# Patient Record
Sex: MALE | Race: WHITE | Employment: FULL TIME | URBAN - NONMETROPOLITAN AREA
[De-identification: names, ages, dates, MRNs, and addresses within clinical notes are randomized per-mention and may not be internally consistent; named-entity substitution may affect disease eponyms.]

---

## 2020-06-17 ENCOUNTER — HOSPITAL ENCOUNTER (EMERGENCY)
Age: 35
Discharge: HOME | End: 2020-06-17
Attending: EMERGENCY MEDICINE
Payer: COMMERCIAL

## 2020-06-17 ENCOUNTER — APPOINTMENT (OUTPATIENT)
Dept: CT IMAGING | Age: 35
End: 2020-06-17
Payer: COMMERCIAL

## 2020-06-17 VITALS
DIASTOLIC BLOOD PRESSURE: 90 MMHG | RESPIRATION RATE: 20 BRPM | SYSTOLIC BLOOD PRESSURE: 139 MMHG | TEMPERATURE: 98.7 F | HEART RATE: 84 BPM | OXYGEN SATURATION: 99 %

## 2020-06-17 PROCEDURE — 99284 EMERGENCY DEPT VISIT MOD MDM: CPT

## 2020-06-17 PROCEDURE — 70450 CT HEAD/BRAIN W/O DYE: CPT

## 2020-06-17 RX ORDER — HYDROXYZINE PAMOATE 25 MG/1
25 CAPSULE ORAL 3 TIMES DAILY PRN
Qty: 20 CAPSULE | Refills: 0 | Status: SHIPPED | OUTPATIENT
Start: 2020-06-17 | End: 2020-07-01

## 2020-06-17 NOTE — ED PROVIDER NOTES
Speech: Speech normal.         Behavior: Behavior normal.         Thought Content: Thought content normal.         Judgment: Judgment normal.         DIAGNOSTIC RESULTS     RADIOLOGY:   Non-plain film images such as CT, Ultrasound and MRI are read by the radiologist. Darlene Priest images are visualized and preliminarily interpreted by the emergency physician with the below findings:    Interpretation per the Radiologist below, if available at the time of this note:    CT Head WO Contrast   Final Result   No acute intracranial abnormality. No skull fracture. Above findings are recorded on a digital voice clip in PACS. Signed by Dr Shahbaz Bellamy on 6/17/2020 10:58 AM            EMERGENCY DEPARTMENT COURSE and DIFFERENTIALDIAGNOSIS/MDM:   Vitals:    Vitals:    06/17/20 0908 06/17/20 1000 06/17/20 1114   BP: (!) 145/91 (!) 144/90 (!) 139/90   Pulse: 82 80 84   Resp: 18 18 20   Temp: 98.7 °F (37.1 °C)     SpO2: 96% 98% 99%       MDM  Patient symptoms sound consistent with PTSD and possibly postconcussive syndrome. Currently, he is totally asymptomatic. No HI. No SI. Offered to have him speak with psychiatry intake which he declined. Told patient to follow-up with [place] [id number] and primary care. .  Told him to return to the ER for change worsening symptoms or new concerns. Patient agreeable plan. CONSULTS:  None    PROCEDURES:  Unless otherwise notedbelow, none     Procedures    FINAL IMPRESSION     1. Injury of head, initial encounter    2. Postconcussive syndrome    3. PTSD (post-traumatic stress disorder)    4.  Elevated blood pressure reading          DISPOSITION/PLAN   DISPOSITION Decision To Discharge 06/17/2020 11:07:12 AM      PATIENT REFERRED TO:  @FUP@    DISCHARGE MEDICATIONS:  Discharge Medication List as of 6/17/2020 11:09 AM      START taking these medications    Details   hydrOXYzine (VISTARIL) 25 MG capsule Take 1 capsule by mouth 3 times daily as needed for Anxiety, Disp-20 capsule, R-0Print                (Please note that portions of this note were completed with a voice recognition program.  Efforts were made to edit the dictations butoccasionally words are mis-transcribed.)    Phill Ernst MD (electronically signed)  AttendingEmergency Physician        Phill Ernst MD  06/18/20 8686

## 2020-06-18 ASSESSMENT — ENCOUNTER SYMPTOMS
ABDOMINAL PAIN: 0
BACK PAIN: 0
DIARRHEA: 0
SHORTNESS OF BREATH: 0
EYE PAIN: 0
VOMITING: 0

## 2020-11-17 ENCOUNTER — OFFICE VISIT (OUTPATIENT)
Dept: NEUROSURGERY | Facility: CLINIC | Age: 35
End: 2020-11-17

## 2020-11-17 VITALS
DIASTOLIC BLOOD PRESSURE: 100 MMHG | BODY MASS INDEX: 26.78 KG/M2 | SYSTOLIC BLOOD PRESSURE: 152 MMHG | WEIGHT: 180.8 LBS | HEIGHT: 69 IN

## 2020-11-17 DIAGNOSIS — S09.90XA HEAD TRAUMA, INITIAL ENCOUNTER: ICD-10-CM

## 2020-11-17 DIAGNOSIS — S06.0X1A CONCUSSION WITH LOSS OF CONSCIOUSNESS OF 30 MINUTES OR LESS, INITIAL ENCOUNTER: Primary | ICD-10-CM

## 2020-11-17 DIAGNOSIS — F43.10 PTSD (POST-TRAUMATIC STRESS DISORDER): ICD-10-CM

## 2020-11-17 DIAGNOSIS — F17.210 CIGARETTE SMOKER: ICD-10-CM

## 2020-11-17 DIAGNOSIS — G44.319 ACUTE POST-TRAUMATIC HEADACHE, NOT INTRACTABLE: ICD-10-CM

## 2020-11-17 DIAGNOSIS — M54.2 CERVICALGIA: ICD-10-CM

## 2020-11-17 PROBLEM — G44.309 POST-TRAUMATIC HEADACHE: Status: ACTIVE | Noted: 2020-11-17

## 2020-11-17 PROCEDURE — 99204 OFFICE O/P NEW MOD 45 MIN: CPT | Performed by: NEUROLOGICAL SURGERY

## 2020-11-17 RX ORDER — DICLOFENAC SODIUM 75 MG/1
75 TABLET, DELAYED RELEASE ORAL 2 TIMES DAILY
Qty: 60 TABLET | Refills: 0 | OUTPATIENT
Start: 2020-11-17 | End: 2022-07-29

## 2020-11-17 RX ORDER — BUTALBITAL, ACETAMINOPHEN AND CAFFEINE 50; 325; 40 MG/1; MG/1; MG/1
1 TABLET ORAL EVERY 8 HOURS PRN
Qty: 84 TABLET | Refills: 0 | Status: SHIPPED | OUTPATIENT
Start: 2020-11-17 | End: 2021-03-30

## 2020-11-17 RX ORDER — CYCLOBENZAPRINE HCL 10 MG
10 TABLET ORAL 3 TIMES DAILY PRN
Qty: 90 TABLET | Refills: 0 | Status: SHIPPED | OUTPATIENT
Start: 2020-11-17 | End: 2021-03-30

## 2020-11-17 NOTE — PROGRESS NOTES
Patient: London Calabrese  : 1985    Primary Care Provider: Provider, No Known    Requesting Provider: Augustina Partida APRN        History    Chief Complaint: Closed head injury  Chief Complaint   Patient presents with   • Neck pain & headaches     patient had a work injury in 2020 where he was hit in the face with a metal pipe       History of Present Illness: 35-year-old gentleman with a complicated history.  He was hit in the head with a casting fixture in 2020 while at work.  He had a positive loss of consciousness for a few minutes and then also broke several teeth.  He was seen by a dentist and then an oral surgeon the following day.  He has had 2 oral surgery procedures for the broken teeth and is waiting his permanent implants.  He was started on some pain medication which he took for a couple months and then stopped his pain medicine in  or July.  He went back to work in July as a .  He is only been able to work about 20 or 30 hours a week because of what are essentially postconcussive symptoms.  He complains of neck pain and headache daily.  This can last several minutes to hours.  It is associated with photophobia and nausea.  He complains of neck pain which is intermittent but no upper extremity pain numbness or tingling.  He describes dizziness with some position changes.  He also describes worsening generalized anxiety, fear of being injured at work, fear of heights.    Review of Systems   Musculoskeletal: Positive for neck pain and neck stiffness.   Neurological: Positive for dizziness and headaches.   All other systems reviewed and are negative.      Past Medical History: No past medical history on file.    Past Surgical History: No past surgical history on file.    Family History: family history is not on file.    Social History:      Medications:  (Not in a hospital admission)      Allergies:  Patient has no allergy information on record.    Physical Exam:      Physical Exam  Eyes:      Extraocular Movements: EOM normal.      Pupils: Pupils are equal, round, and reactive to light.   Cardiovascular:      Rate and Rhythm: Normal rate and regular rhythm.   Pulmonary:      Effort: Pulmonary effort is normal. No respiratory distress.   Abdominal:      General: There is no distension.      Palpations: Abdomen is soft.      Tenderness: There is no abdominal tenderness.   Neurological:      Mental Status: He is oriented to person, place, and time.      Coordination: Finger-Nose-Finger Test normal.      Gait: Gait is intact.      Deep Tendon Reflexes: Strength normal.   Psychiatric:         Speech: Speech normal.         Neurologic Exam     Mental Status   Oriented to person, place, and time.   Attention: normal.   Speech: speech is normal   Level of consciousness: alert  Knowledge: good.     Cranial Nerves     CN II   Visual fields full to confrontation.     CN III, IV, VI   Pupils are equal, round, and reactive to light.  Extraocular motions are normal.     CN V   Facial sensation intact.     CN VII   Facial expression full, symmetric.     CN VIII   CN VIII normal.     CN IX, X   CN IX normal.   CN X normal.     CN XI   CN XI normal.     CN XII   CN XII normal.     Motor Exam   Muscle bulk: normal  Overall muscle tone: normal  Right arm pronator drift: absent  Left arm pronator drift: absent    Strength   Strength 5/5 throughout.     Sensory Exam   Light touch normal.   Pinprick normal.     Gait, Coordination, and Reflexes     Gait  Gait: normal    Coordination   Finger to nose coordination: normal    Tremor   Resting tremor: absent  Intention tremor: absent  Action tremor: absent    Reflexes   Reflexes 2+ except as noted.         Independent Review of Radiographic Studies:   Report of CT scan of the head unremarkable    ASSESSMENT/PLAN:1.  Postconcussion syndrome.  The patient presents with a classic postconcussion syndrome.  His symptoms are aggravated by activities.  We  discussed this at length.  I would recommend taking him out of work to engage in no strenuous activities in order to begin the process of recovering from his concussion.  In addition we are going to make a referral for vestibular ocular rehab postconcussion.  We will prescribe Fioricet for his headaches.  2.  Cervicalgia.  The patient is complaining of neck pain and associated with headaches.  Again this is worse with activities.  We are going to image his neck as this is never been done since the accident.  We will get a CT scan and plain x-rays of the cervical spine.  We will also likely refer him for physical therapy and rehab for his neck injury.  We will also prescribe Voltaren and Flexeril for his neck pain.  3.  Facial injury.  Given the severity of his facial injury I would recommend a CT scan of the facial bones to ensure that there was no additional damage done beyond his teeth.  4.  Anxiety.  The patient has a component of posttraumatic stress disorder since the accident.  We explained this to him we will continue to monitor it and treat his other symptoms to see if this improves.  No diagnosis found.      No follow-ups on file.      Anthony Orona MD

## 2020-11-17 NOTE — PATIENT INSTRUCTIONS
"PATIENT TO CONTINUE TO FOLLOW UP WITH HIS PRIMARY CARE PROVIDER FOR YEARLY PHYSICAL EXAMS TO ENSURE COMPLETE HEALTH MAINTENANCE        BMI for Adults  What is BMI?  Body mass index (BMI) is a number that is calculated from a person's weight and height. BMI can help estimate how much of a person's weight is composed of fat. BMI does not measure body fat directly. Rather, it is an alternative to procedures that directly measure body fat, which can be difficult and expensive.  BMI can help identify people who may be at higher risk for certain medical problems.  What are BMI measurements used for?  BMI is used as a screening tool to identify possible weight problems. It helps determine whether a person is obese, overweight, a healthy weight, or underweight.  BMI is useful for:  · Identifying a weight problem that may be related to a medical condition or may increase the risk for medical problems.  · Promoting changes, such as changes in diet and exercise, to help reach a healthy weight. BMI screening can be repeated to see if these changes are working.  How is BMI calculated?  BMI involves measuring your weight in relation to your height. Both height and weight are measured, and the BMI is calculated from those numbers. This can be done either in English ([place] or metric measurements. Note that charts and online BMI calculators are available to help you find your BMI quickly and easily without having to do these calculations yourself.  To calculate your BMI in English ([place] measurements:    1. Measure your weight in pounds (lb).  2. Multiply the number of pounds by 703.  ? For example, for a person who weighs 180 lb, multiply that number by 703, which equals 126,540.  3. Measure your height in inches. Then multiply that number by itself to get a measurement called \"inches squared.\"  ? For example, for a person who is 70 inches tall, the \"inches squared\" measurement is 70 inches x 70 inches, which equals 4,900 inches " "squared.  4. Divide the total from step 2 (number of lb x 703) by the total from step 3 (inches squared): 126,540 ÷ 4,900 = 25.8. This is your BMI.  To calculate your BMI in metric measurements:  1. Measure your weight in kilograms (kg).  2. Measure your height in meters (m). Then multiply that number by itself to get a measurement called \"meters squared.\"  ? For example, for a person who is 1.75 m tall, the \"meters squared\" measurement is 1.75 m x 1.75 m, which is equal to 3.1 meters squared.  3. Divide the number of kilograms (your weight) by the meters squared number. In this example: 70 ÷ 3.1 = 22.6. This is your BMI.  What do the results mean?  BMI charts are used to identify whether you are underweight, normal weight, overweight, or obese. The following guidelines will be used:  · Underweight: BMI less than 18.5.  · Normal weight: BMI between 18.5 and 24.9.  · Overweight: BMI between 25 and 29.9.  · Obese: BMI of 30 or above.  Keep these notes in mind:  · Weight includes both fat and muscle, so someone with a muscular build, such as an athlete, may have a BMI that is higher than 24.9. In cases like these, BMI is not an accurate measure of body fat.  · To determine if excess body fat is the cause of a BMI of 25 or higher, further assessments may need to be done by a health care provider.  · BMI is usually interpreted in the same way for men and women.  Where to find more information  For more information about BMI, including tools to quickly calculate your BMI, go to these websites:  · Centers for Disease Control and Prevention: www.cdc.gov  · American Heart Association: www.heart.org  · National Heart, Lung, and Blood [place]: www.nhlbi.nih.gov  Summary  · Body mass index (BMI) is a number that is calculated from a person's weight and height.  · BMI may help estimate how much of a person's weight is composed of fat. BMI can help identify those who may be at higher risk for certain medical problems.  · BMI " "can be measured using English measurements or metric measurements.  · BMI charts are used to identify whether you are underweight, normal weight, overweight, or obese.  This information is not intended to replace advice given to you by your health care provider. Make sure you discuss any questions you have with your health care provider.  Document Released: 08/29/2005 Document Revised: 09/09/2020 Document Reviewed: 07/17/2020  Elsevier Patient Education © 2020 [employer]      DASH Eating Plan  DASH stands for \"Dietary Approaches to Stop Hypertension.\" The DASH eating plan is a healthy eating plan that has been shown to reduce high blood pressure (hypertension). It may also reduce your risk for type 2 diabetes, heart disease, and stroke. The DASH eating plan may also help with weight loss.  What are tips for following this plan?    General guidelines  · Avoid eating more than 2,300 mg (milligrams) of salt (sodium) a day. If you have hypertension, you may need to reduce your sodium intake to 1,500 mg a day.  · Limit alcohol intake to no more than 1 drink a day for nonpregnant women and 2 drinks a day for men. One drink equals 12 oz of beer, 5 oz of wine, or 1½ oz of hard liquor.  · Work with your health care provider to maintain a healthy body weight or to lose weight. Ask what an ideal weight is for you.  · Get at least 30 minutes of exercise that causes your heart to beat faster (aerobic exercise) most days of the week. Activities may include walking, swimming, or biking.  · Work with your health care provider or diet and nutrition specialist (dietitian) to adjust your eating plan to your individual calorie needs.  Reading food labels    · Check food labels for the amount of sodium per serving. Choose foods with less than 5 percent of the Daily Value of sodium. Generally, foods with less than 300 mg of sodium per serving fit into this eating plan.  · To find whole grains, look for the word \"whole\" as the first " "word in the ingredient list.  Shopping  · Buy products labeled as \"low-sodium\" or \"no salt added.\"  · Buy fresh foods. Avoid canned foods and premade or frozen meals.  Cooking  · Avoid adding salt when cooking. Use salt-free seasonings or herbs instead of table salt or sea salt. Check with your health care provider or pharmacist before using salt substitutes.  · Do not snider foods. Cook foods using healthy methods such as baking, boiling, grilling, and broiling instead.  · Cook with heart-healthy oils, such as olive, canola, soybean, or sunflower oil.  Meal planning  · Eat a balanced diet that includes:  ? 5 or more servings of fruits and vegetables each day. At each meal, try to fill half of your plate with fruits and vegetables.  ? Up to 6-8 servings of whole grains each day.  ? Less than 6 oz of lean meat, poultry, or fish each day. A 3-oz serving of meat is about the same size as a deck of cards. One egg equals 1 oz.  ? 2 servings of low-fat dairy each day.  ? A serving of nuts, seeds, or beans 5 times each week.  ? Heart-healthy fats. Healthy fats called Omega-3 fatty acids are found in foods such as flaxseeds and coldwater fish, like sardines, salmon, and mackerel.  · Limit how much you eat of the following:  ? Canned or prepackaged foods.  ? Food that is high in trans fat, such as fried foods.  ? Food that is high in saturated fat, such as fatty meat.  ? Sweets, desserts, sugary drinks, and other foods with added sugar.  ? Full-fat dairy products.  · Do not salt foods before eating.  · Try to eat at least 2 vegetarian meals each week.  · Eat more home-cooked food and less restaurant, buffet, and fast food.  · When eating at a restaurant, ask that your food be prepared with less salt or no salt, if possible.  What foods are recommended?  The items listed may not be a complete list. Talk with your dietitian about what dietary choices are best for you.  Grains  Whole-grain or whole-wheat bread. Whole-grain or " whole-wheat pasta. Brown rice. Oatmeal. Quinoa. Bulgur. Whole-grain and low-sodium cereals. Sandra bread. Low-fat, low-sodium crackers. Whole-wheat flour tortillas.  Vegetables  Fresh or frozen vegetables (raw, steamed, roasted, or grilled). Low-sodium or reduced-sodium tomato and vegetable juice. Low-sodium or reduced-sodium tomato sauce and tomato paste. Low-sodium or reduced-sodium canned vegetables.  Fruits  All fresh, dried, or frozen fruit. Canned fruit in natural juice (without added sugar).  Meat and other protein foods  Skinless chicken or turkey. Ground chicken or turkey. Pork with fat trimmed off. Fish and seafood. Egg whites. Dried beans, peas, or lentils. Unsalted nuts, nut butters, and seeds. Unsalted canned beans. Lean cuts of beef with fat trimmed off. Low-sodium, lean deli meat.  Dairy  Low-fat (1%) or fat-free (skim) milk. Fat-free, low-fat, or reduced-fat cheeses. Nonfat, low-sodium ricotta or cottage cheese. Low-fat or nonfat yogurt. Low-fat, low-sodium cheese.  Fats and oils  Soft margarine without trans fats. Vegetable oil. Low-fat, reduced-fat, or light mayonnaise and salad dressings (reduced-sodium). Canola, safflower, olive, soybean, and sunflower oils. Avocado.  Seasoning and other foods  Herbs. Spices. Seasoning mixes without salt. Unsalted popcorn and pretzels. Fat-free sweets.  What foods are not recommended?  The items listed may not be a complete list. Talk with your dietitian about what dietary choices are best for you.  Grains  Baked goods made with fat, such as croissants, muffins, or some breads. Dry pasta or rice meal packs.  Vegetables  Creamed or fried vegetables. Vegetables in a cheese sauce. Regular canned vegetables (not low-sodium or reduced-sodium). Regular canned tomato sauce and paste (not low-sodium or reduced-sodium). Regular tomato and vegetable juice (not low-sodium or reduced-sodium). Pickles. Olives.  Fruits  Canned fruit in a light or heavy syrup. Fried fruit. Fruit  in cream or butter sauce.  Meat and other protein foods  Fatty cuts of meat. Ribs. Fried meat. Shannon. Sausage. Bologna and other processed lunch meats. Salami. Fatback. Hotdogs. Bratwurst. Salted nuts and seeds. Canned beans with added salt. Canned or smoked fish. Whole eggs or egg yolks. Chicken or turkey with skin.  Dairy  Whole or 2% milk, cream, and half-and-half. Whole or full-fat cream cheese. Whole-fat or sweetened yogurt. Full-fat cheese. Nondairy creamers. Whipped toppings. Processed cheese and cheese spreads.  Fats and oils  Butter. Stick margarine. Lard. Shortening. Ghee. Shannon fat. Tropical oils, such as coconut, palm kernel, or palm oil.  Seasoning and other foods  Salted popcorn and pretzels. Onion salt, garlic salt, seasoned salt, table salt, and sea salt. Worcestershire sauce. Tartar sauce. Barbecue sauce. Teriyaki sauce. Soy sauce, including reduced-sodium. Steak sauce. Canned and packaged gravies. Fish sauce. Oyster sauce. Cocktail sauce. Horseradish that you find on the shelf. Ketchup. Mustard. Meat flavorings and tenderizers. Bouillon cubes. Hot sauce and Tabasco sauce. Premade or packaged marinades. Premade or packaged taco seasonings. Relishes. Regular salad dressings.  Where to find more information:  · National Heart, Lung, and Blood [place]: www.nhlbi.nih.gov  · American Heart Association: www.heart.org  Summary  · The DASH eating plan is a healthy eating plan that has been shown to reduce high blood pressure (hypertension). It may also reduce your risk for type 2 diabetes, heart disease, and stroke.  · With the DASH eating plan, you should limit salt (sodium) intake to 2,300 mg a day. If you have hypertension, you may need to reduce your sodium intake to 1,500 mg a day.  · When on the DASH eating plan, aim to eat more fresh fruits and vegetables, whole grains, lean proteins, low-fat dairy, and heart-healthy fats.  · Work with your health care provider or diet and nutrition specialist  (dietitian) to adjust your eating plan to your individual calorie needs.  This information is not intended to replace advice given to you by your health care provider. Make sure you discuss any questions you have with your health care provider.  Document Released: 12/06/2012 Document Revised: 11/30/2018 Document Reviewed: 12/11/2017  [employer] Patient Education © 2020 [employer]      Steps to Quit Smoking  Smoking tobacco is the leading cause of preventable death. It can affect almost every organ in the body. Smoking puts you and those around you at risk for developing many serious chronic diseases. Quitting smoking can be difficult, but it is one of the best things that you can do for your health. It is never too late to quit.  How do I get ready to quit?  When you decide to quit smoking, create a plan to help you succeed. Before you quit:  · Pick a date to quit. Set a date within the next 2 weeks to give you time to prepare.  · Write down the reasons why you are quitting. Keep this list in places where you will see it often.  · Tell your family, friends, and co-workers that you are quitting. Support from your loved ones can make quitting easier.  · Talk with your health care provider about your options for quitting smoking.  · Find out what treatment options are covered by your health insurance.  · Identify people, places, things, and activities that make you want to smoke (triggers). Avoid them.  What first steps can I take to quit smoking?  · Throw away all cigarettes at home, at work, and in your car.  · Throw away smoking accessories, such as ashtrays and lighters.  · Clean your car. Make sure to empty the ashtray.  · Clean your home, including curtains and carpets.  What strategies can I use to quit smoking?  Talk with your health care provider about combining strategies, such as taking medicines while you are also receiving in-person counseling. Using these two strategies together makes you more likely to  succeed in quitting than if you used either strategy on its own.  · If you are pregnant or breastfeeding, talk with your health care provider about finding counseling or other support strategies to quit smoking. Do not take medicine to help you quit smoking unless your health care provider tells you to do so.  To quit smoking:  Quit right away  · Quit smoking completely, instead of gradually reducing how much you smoke over a period of time. Research shows that stopping smoking right away is more successful than gradually quitting.  · Attend in-person counseling to help you build problem-solving skills. You are more likely to succeed in quitting if you attend counseling sessions regularly. Even short sessions of 10 minutes can be effective.  Take medicine  You may take medicines to help you quit smoking. Some medicines require a prescription and some you can purchase over-the-counter. Medicines may have nicotine in them to replace the nicotine in cigarettes. Medicines may:  · Help to stop cravings.  · Help to relieve withdrawal symptoms.  Your health care provider may recommend:  · Nicotine patches, gum, or lozenges.  · Nicotine inhalers or sprays.  · Non-nicotine medicine that is taken by mouth.  Find resources  Find resources and support systems that can help you to quit smoking and remain smoke-free after you quit. These resources are most helpful when you use them often. They include:  · Online chats with a counselor.  · Telephone quitlines.  · Printed self-help materials.  · Support groups or group counseling.  · Text messaging programs.  · Mobile phone apps or applications. Use apps that can help you stick to your quit plan by providing reminders, tips, and encouragement. There are many free apps for mobile devices as well as websites. Examples include Quit Guide from the CDC and smokefree.gov  What things can I do to make it easier to quit?    · Reach out to your family and friends for support and  encouragement. Call telephone quitlines (0-928-QUITNOW), reach out to support groups, or work with a counselor for support.  · Ask people who smoke to avoid smoking around you.  · Avoid places that trigger you to smoke, such as bars, parties, or smoke-break areas at work.  · Spend time with people who do not smoke.  · Lessen the stress in your life. Stress can be a smoking trigger for some people. To lessen stress, try:  ? Exercising regularly.  ? Doing deep-breathing exercises.  ? Doing yoga.  ? Meditating.  ? Performing a body scan. This involves closing your eyes, scanning your body from head to toe, and noticing which parts of your body are particularly tense. Try to relax the muscles in those areas.  How will I feel when I quit smoking?  Day 1 to 3 weeks  Within the first 24 hours of quitting smoking, you may start to feel withdrawal symptoms. These symptoms are usually most noticeable 2-3 days after quitting, but they usually do not last for more than 2-3 weeks. You may experience these symptoms:  · Mood swings.  · Restlessness, anxiety, or irritability.  · Trouble concentrating.  · Dizziness.  · Strong cravings for sugary foods and nicotine.  · Mild weight gain.  · Constipation.  · Nausea.  · Coughing or a sore throat.  · Changes in how the medicines that you take for unrelated issues work in your body.  · Depression.  · Trouble sleeping (insomnia).  Week 3 and afterward  After the first 2-3 weeks of quitting, you may start to notice more positive results, such as:  · Improved sense of smell and taste.  · Decreased coughing and sore throat.  · Slower heart rate.  · Lower blood pressure.  · Clearer skin.  · The ability to breathe more easily.  · Fewer sick days.  Quitting smoking can be very challenging. Do not get discouraged if you are not successful the first time. Some people need to make many attempts to quit before they achieve long-term success. Do your best to stick to your quit plan, and talk with  your health care provider if you have any questions or concerns.  Summary  · Smoking tobacco is the leading cause of preventable death. Quitting smoking is one of the best things that you can do for your health.  · When you decide to quit smoking, create a plan to help you succeed.  · Quit smoking right away, not slowly over a period of time.  · When you start quitting, seek help from your health care provider, family, or friends.  This information is not intended to replace advice given to you by your health care provider. Make sure you discuss any questions you have with your health care provider.  Document Released: 12/12/2002 Document Revised: 09/11/2020 Document Reviewed: 03/07/2020  Elsevier Patient Education © 2020 [employer]

## 2020-11-19 ENCOUNTER — TELEPHONE (OUTPATIENT)
Dept: NEUROSURGERY | Facility: CLINIC | Age: 35
End: 2020-11-19

## 2020-11-20 ENCOUNTER — TREATMENT (OUTPATIENT)
Dept: PHYSICAL THERAPY | Facility: CLINIC | Age: 35
End: 2020-11-20

## 2020-11-20 DIAGNOSIS — S09.90XA HEAD TRAUMA, INITIAL ENCOUNTER: ICD-10-CM

## 2020-11-20 DIAGNOSIS — M54.2 CERVICALGIA: ICD-10-CM

## 2020-11-20 DIAGNOSIS — S06.0X1A CONCUSSION WITH LOSS OF CONSCIOUSNESS OF 30 MINUTES OR LESS, INITIAL ENCOUNTER: Primary | ICD-10-CM

## 2020-11-20 PROCEDURE — 97162 PT EVAL MOD COMPLEX 30 MIN: CPT | Performed by: PHYSICAL THERAPIST

## 2020-12-01 ENCOUNTER — TREATMENT (OUTPATIENT)
Dept: PHYSICAL THERAPY | Facility: CLINIC | Age: 35
End: 2020-12-01

## 2020-12-01 DIAGNOSIS — S06.0X1A CONCUSSION WITH LOSS OF CONSCIOUSNESS OF 30 MINUTES OR LESS, INITIAL ENCOUNTER: Primary | ICD-10-CM

## 2020-12-01 DIAGNOSIS — M54.2 CERVICALGIA: ICD-10-CM

## 2020-12-01 DIAGNOSIS — S09.90XA HEAD TRAUMA, INITIAL ENCOUNTER: ICD-10-CM

## 2020-12-01 PROCEDURE — 97140 MANUAL THERAPY 1/> REGIONS: CPT | Performed by: PHYSICAL THERAPIST

## 2020-12-01 NOTE — PROGRESS NOTES
Outpatient Physical Therapy Ortho Treatment Note       Patient Name: London Calabrese  : 1985  MRN: [id number]  Today's Date: 2020      Visit Date: 2020    Visit Dx:    ICD-10-CM ICD-9-CM   1. Concussion with loss of consciousness of 30 minutes or less, initial encounter  S06.0X1A 850.11   2. Cervicalgia  M54.2 723.1   3. Head trauma, initial encounter  S09.90XA 959.01       Patient Active Problem List   Diagnosis   • Cigarette smoker   • BMI 26.0-26.9,adult   • Head trauma, initial encounter   • Concussion with loss of consciousness of 30 minutes or less   • Post-traumatic headache   • Cervicalgia   • PTSD (post-traumatic stress disorder)        Past Medical History:   Diagnosis Date   • Brain concussion     work injury in 2020   • Headache    • Neck pain     from the work injury in 2020        Past Surgical History:   Procedure Laterality Date   • NO PAST SURGERIES                         PT Assessment/Plan     Row Name 20          PT Assessment    Assessment Comments  Today is his first visit after his eval. His HEP works very well for him. He understands better the cognitive parts of his concussion. I did a trial of dry needling today to try to get his mms to relax a bit. He was extremely guarded and tight and it was painful for him but after, he could turn his head easier without feeling the grinding in his neck he normally does.   -TB        PT Plan    PT Plan Comments  Cont to work on muscle relaxation and slowly progress POC with balance and vestibular rehab and neck pain diminishes.   -TB       User Key  (r) = Recorded By, (t) = Taken By, (c) = Cosigned By    Initials Name Provider Type    TB Pete York, PT Physical Therapist            OP Exercises     Row Name 20 0945 20 0940          Subjective Comments    Subjective Comments  He is still struggling with neck pain and headaches. He is also having headaches and struggling with cognition and procession.  "He gets overwhelmed easily and struggles to stay on topic. He's been doing his HEP of unloading the weight off his neck with pillows under his arms.   -TB  --        Subjective Pain    Pre-Treatment Pain Level  7  -TB  --        Total Minutes    33204 - PT Manual Therapy Minutes  --  38  -TB       User Key  (r) = Recorded By, (t) = Taken By, (c) = Cosigned By    Initials Name Provider Type    TB Pete York, PT Physical Therapist                      Manual Rx (last 36 hours)      Manual Treatments     Row Name 12/01/20 0940             Total Minutes    22202 - PT Manual Therapy Minutes  38  -TB         Manual Rx 1    Manual Rx 1 Location  Trial of dry needling, prone  -TB      Manual Rx 1 Type  marcus accessory nn (2\", one LTR right, very guarded and painful), marcus C5 post cut (2\", very guarded and painful); marcus inf cerv oblique (1\"), marcus subocc, UT/SC (0.5\")  -TB         Manual Rx 2    Manual Rx 2 Location  prone marcus UT/LS and post cervical mms  -TB      Manual Rx 2 Type  STM  -TB         Manual Rx 3    Manual Rx 3 Location  prone gentle CT junction  -TB      Manual Rx 3 Type  ext mobs sustained  -TB         Manual Rx 4    Manual Rx 4 Location  marcus subocc STM and gentle PA's  -TB         Manual Rx 5    Manual Rx 5 Location  supine subocc release  -TB      Manual Rx 5 Type  gentle sustained OP  -TB        User Key  (r) = Recorded By, (t) = Taken By, (c) = Cosigned By    Initials Name Provider Type    TB Pete York, PT Physical Therapist          PT OP Goals     Row Name 12/01/20 0940          PT Short Term Goals    STG Date to Achieve  12/20/20  -TB     STG 1  Pt will demonstrate 50 degrees or greater (B) cervical rotaiton.   -TB     STG 1 Progress  Ongoing  -TB     STG 1 Progress Comments  painful with rotation  -TB        Long Term Goals    LTG Date to Achieve  01/19/21  -TB     LTG 1  Pt will demonstrate 65 degrees or greater (B) cervical rotaiton.   -TB     LTG 1 Progress  Ongoing  -TB     LTG 2  Pt will " score 10 or less on the NDI.   -TB     LTG 2 Progress  Ongoing  -TB     LTG 3  Pt will be indpendent with HEP to include postural strengthening and vestibular exercises.   -TB     LTG 3 Progress  Ongoing  -TB     LTG 4  Pt will report no dizziness with bending over for one week.   -TB     LTG 4 Progress  Ongoing  -TB     LTG 5  Pt will demonstrate SLS for 30 seconds or greater (B).   -TB     LTG 5 Progress  Ongoing  -TB        Time Calculation    PT Goal Re-Cert Due Date  12/20/20  -TB       User Key  (r) = Recorded By, (t) = Taken By, (c) = Cosigned By    Initials Name Provider Type    TB Pete York, PT Physical Therapist          Therapy Education  Education Details: discussed post concussion symptoms (anxiety, cognition, vertigo, pain, etc and path to improving these)  Given: HEP, Symptoms/condition management, Posture/body mechanics  Program: Reinforced  How Provided: Verbal, Demonstration, Written  Provided to: Patient  Level of Understanding: Verbalized, Demonstrated              Time Calculation:                    Pete York, PT  12/1/2020

## 2020-12-03 ENCOUNTER — TREATMENT (OUTPATIENT)
Dept: PHYSICAL THERAPY | Facility: CLINIC | Age: 35
End: 2020-12-03

## 2020-12-03 DIAGNOSIS — S09.90XA HEAD TRAUMA, INITIAL ENCOUNTER: ICD-10-CM

## 2020-12-03 DIAGNOSIS — S06.0X1A CONCUSSION WITH LOSS OF CONSCIOUSNESS OF 30 MINUTES OR LESS, INITIAL ENCOUNTER: Primary | ICD-10-CM

## 2020-12-03 DIAGNOSIS — M54.2 CERVICALGIA: ICD-10-CM

## 2020-12-03 PROCEDURE — 97140 MANUAL THERAPY 1/> REGIONS: CPT | Performed by: PHYSICAL THERAPIST

## 2020-12-03 PROCEDURE — 97112 NEUROMUSCULAR REEDUCATION: CPT | Performed by: PHYSICAL THERAPIST

## 2020-12-03 NOTE — PROGRESS NOTES
Outpatient Physical Therapy Neuro Treatment Note       Patient Name: London Calabrese  : 1985  MRN: [id number]  Today's Date: 12/3/2020      Visit Date: 2020    Visit Dx:    ICD-10-CM ICD-9-CM   1. Concussion with loss of consciousness of 30 minutes or less, initial encounter  S06.0X1A 850.11   2. Cervicalgia  M54.2 723.1   3. Head trauma, initial encounter  S09.90XA 959.01       Patient Active Problem List   Diagnosis   • Cigarette smoker   • BMI 26.0-26.9,adult   • Head trauma, initial encounter   • Concussion with loss of consciousness of 30 minutes or less   • Post-traumatic headache   • Cervicalgia   • PTSD (post-traumatic stress disorder)                       PT Assessment/Plan     Row Name 20 1016          PT Assessment    Assessment Comments  London has increased cervical and thoracic hypomobility and pain which we addressed with STM today. He continues to have difficulty with forward flexion causing dizziness. He has more difficulty with visual tracking to the R side and he struggled with midline orientation, tending to lean to the L side. With balance activities, he demonstrated increased instability with eyes closed as well as tandem stance with RLE posterior. He demonstrated increased unsteadiness with WS to L side and with eyes closed was unable to orient to from R WS to midline. He will benefit from continued skilled therapy to address soft tissue restrictions and decrease vestibular symptoms during movement.   -EC (r) KM (t) EC (c)        PT Plan    PT Plan Comments  Continue with STM to cervical spine and cervical AROM. Progress with balance and visual training as tolerated.   -EC (r) KM (t) EC (c)       User Key  (r) = Recorded By, (t) = Taken By, (c) = Cosigned By    Initials Name Provider Type    Eusebio Marmolejo PTA Physical Therapy Assistant    Holly Moreno PTA Student Physical Therapy Assistant             OP Exercises     Row Name 20 1016             Subjective  Comments    Subjective Comments  He reported that he had severe pain following the DN trial. He continues having issues with popping in neck leading to headaches. He is having difficulty seeing things far away and sees black spots when he bends forward. He feels more tension on L neck and the pain is moving down into his shoulder blades. He is having an MRI tomorrow.   -EC (r) KM (t) EC (c)         Subjective Pain    Able to rate subjective pain?  yes  -EC (r) KM (t) EC (c)      Pre-Treatment Pain Level  7  -EC (r) KM (t) EC (c)      Post-Treatment Pain Level  4 upper cervical   -EC (r) KM (t) EC (c)         Total Minutes    84058 -  PT Neuromuscular Reeducation Minutes  25  -EC (r) KM (t) EC (c)      87672 - PT Manual Therapy Minutes  20  -EC (r) KM (t) EC (c)         Exercise 1    Exercise Name 1  Assessed visual tracking    -EC (r) KM (t) EC (c)      Additional Comments  Increased dizziness, blink rate and eye movement with moving from center to R and from L to R. Nystagmus noted with movement to both sides.   -EC (r) KM (t) EC (c)         Exercise 2    Exercise Name 2  Rhomberg stance EO/EC   -EC (r) KM (t) EC (c)      Cueing 2  Verbal;Tactile  -EC (r) KM (t) EC (c)      Sets 2  2  -EC (r) KM (t) EC (c)      Time 2  30 sec  -EC (r) KM (t) EC (c)      Additional Comments  More difficulty with EC  -EC (r) KM (t) EC (c)         Exercise 3    Exercise Name 3  Tandem stance EO/EC  -EC (r) KM (t) EC (c)      Cueing 3  Verbal;Tactile;Demo  -EC (r) KM (t) EC (c)      Sets 3  2 each  -EC (r) KM (t) EC (c)      Time 3  10 seconds  -EC (r) KM (t) EC (c)      Additional Comments  More difficulty with EC and with RLE posterior   -EC (r) KM (t) EC (c)         Exercise 4    Exercise Name 4  Weight shift on foam EO-EC  -EC (r) KM (t) EC (c)      Cueing 4  Verbal;Tactile;Demo  -EC (r) KM (t) EC (c)      Sets 4  2  -EC (r) KM (t) EC (c)      Additional Comments  Assessed midline orientation. Pt feels weight shift to L side is  centered.   -EC (r) KM (t) EC (c)        User Key  (r) = Recorded By, (t) = Taken By, (c) = Cosigned By    Initials Name Provider Type    Eusebio Marmolejo PTA Physical Therapy Assistant    Hloly Moreno PTA Student Physical Therapy Assistant                     Manual Rx (last 36 hours)      Manual Treatments     Row Name 12/03/20 1016             Total Minutes    02051 - PT Manual Therapy Minutes  20  -EC (r) KM (t) EC (c)         Manual Rx 1    Manual Rx 1 Location  B cervical paraspinals, suboccipitals, UT, rhomboids  -EC (r) KM (t) EC (c)      Manual Rx 1 Type  STM wih massage cream with pt in massage chair  -EC (r) KM (t) EC (c)      Manual Rx 1 Grade  min  -EC (r) KM (t) EC (c)      Manual Rx 1 Duration  20  -EC (r) KM (t) EC (c)        User Key  (r) = Recorded By, (t) = Taken By, (c) = Cosigned By    Initials Name Provider Type    Eusebio Marmolejo PTA Physical Therapy Assistant    Holly Moreno, LINDSEY Student Physical Therapy Assistant          PT OP Goals     Row Name 12/03/20 1016          PT Short Term Goals    STG Date to Achieve  12/20/20  -EC (r) KM (t) EC (c)     STG 1  Pt will demonstrate 50 degrees or greater (B) cervical rotaiton.   -EC (r) KM (t) EC (c)     STG 1 Progress  Ongoing  -EC (r) KM (t) EC (c)     STG 1 Progress Comments  Educated pt to perform cervical AROM at home to avoid stiffness.   -EC (r) KM (t) EC (c)        Long Term Goals    LTG Date to Achieve  01/19/21  -EC (r) KM (t) EC (c)     LTG 1  Pt will demonstrate 65 degrees or greater (B) cervical rotaiton.   -EC (r) KM (t) EC (c)     LTG 1 Progress  Ongoing  -EC (r) KM (t) EC (c)     LTG 2  Pt will score 10 or less on the NDI.   -EC (r) KM (t) EC (c)     LTG 2 Progress  Ongoing  -EC (r) KM (t) EC (c)     LTG 3  Pt will be indpendent with HEP to include postural strengthening and vestibular exercises.   -EC (r) KM (t) EC (c)     LTG 3 Progress  Ongoing  -EC (r) KM (t) EC (c)     LTG 4  Pt will report no dizziness with  bending over for one week.   -EC (r) KM (t) EC (c)     LTG 4 Progress  Ongoing  -EC (r) KM (t) EC (c)     LTG 5  Pt will demonstrate SLS for 30 seconds or greater (B).   -EC (r) KM (t) EC (c)     LTG 5 Progress  Ongoing  -EC (r) KM (t) EC (c)        Time Calculation    PT Goal Re-Cert Due Date  12/20/20  -EC (r) KM (t) EC (c)       User Key  (r) = Recorded By, (t) = Taken By, (c) = Cosigned By    Initials Name Provider Type    Eusebio Marmolejo PTA Physical Therapy Assistant    Holly Moreno PTA Student Physical Therapy Assistant          Therapy Education  Education Details: Educated on vestibular symptoms and progression. Educated to use heat to manage pain and added cervical AROM to HEP  Given: HEP, Symptoms/condition management, Pain management  Program: New, Reinforced  How Provided: Verbal, Demonstration  Provided to: Patient  Level of Understanding: Verbalized              Time Calculation:   Total Timed Code Minutes- PT: 45 minute(s)                 Eusebio Gonzalez PTA  12/3/2020

## 2020-12-04 ENCOUNTER — HOSPITAL ENCOUNTER (OUTPATIENT)
Dept: CT IMAGING | Facility: HOSPITAL | Age: 35
Discharge: HOME | End: 2020-12-04
Admitting: NEUROLOGICAL SURGERY

## 2020-12-04 DIAGNOSIS — S06.0X1A CONCUSSION WITH LOSS OF CONSCIOUSNESS OF 30 MINUTES OR LESS, INITIAL ENCOUNTER: ICD-10-CM

## 2020-12-04 DIAGNOSIS — F43.10 PTSD (POST-TRAUMATIC STRESS DISORDER): ICD-10-CM

## 2020-12-04 DIAGNOSIS — S09.90XA HEAD TRAUMA, INITIAL ENCOUNTER: ICD-10-CM

## 2020-12-04 DIAGNOSIS — G44.319 ACUTE POST-TRAUMATIC HEADACHE, NOT INTRACTABLE: ICD-10-CM

## 2020-12-04 DIAGNOSIS — M54.2 CERVICALGIA: ICD-10-CM

## 2020-12-04 PROCEDURE — 72125 CT NECK SPINE W/O DYE: CPT

## 2020-12-04 PROCEDURE — 70486 CT MAXILLOFACIAL W/O DYE: CPT

## 2020-12-08 ENCOUNTER — TREATMENT (OUTPATIENT)
Dept: PHYSICAL THERAPY | Facility: CLINIC | Age: 35
End: 2020-12-08

## 2020-12-08 DIAGNOSIS — S06.0X1A CONCUSSION WITH LOSS OF CONSCIOUSNESS OF 30 MINUTES OR LESS, INITIAL ENCOUNTER: Primary | ICD-10-CM

## 2020-12-08 DIAGNOSIS — S09.90XA HEAD TRAUMA, INITIAL ENCOUNTER: ICD-10-CM

## 2020-12-08 DIAGNOSIS — M54.2 CERVICALGIA: ICD-10-CM

## 2020-12-08 PROCEDURE — 97112 NEUROMUSCULAR REEDUCATION: CPT | Performed by: PHYSICAL THERAPIST

## 2020-12-08 PROCEDURE — 97140 MANUAL THERAPY 1/> REGIONS: CPT | Performed by: PHYSICAL THERAPIST

## 2020-12-08 PROCEDURE — 97110 THERAPEUTIC EXERCISES: CPT | Performed by: PHYSICAL THERAPIST

## 2020-12-08 NOTE — PROGRESS NOTES
Outpatient Physical Therapy Vestibular Treatment Note       Patient Name: London Caalbrese  : 1985  MRN: [id number]  Today's Date: 2020      Visit Date: 2020    Visit Dx:     ICD-10-CM ICD-9-CM   1. Concussion with loss of consciousness of 30 minutes or less, initial encounter  S06.0X1A 850.11   2. Cervicalgia  M54.2 723.1   3. Head trauma, initial encounter  S09.90XA 959.01       Patient Active Problem List   Diagnosis   • Cigarette smoker   • BMI 26.0-26.9,adult   • Head trauma, initial encounter   • Concussion with loss of consciousness of 30 minutes or less   • Post-traumatic headache   • Cervicalgia   • PTSD (post-traumatic stress disorder)                       PT Assessment/Plan     Row Name 20 1600          PT Assessment    Assessment Comments  Today was the first session that we added visual stimuli which the activities at the BITS caused increased blink rate and dizziness with some slight nausea. However, after brief rest breaks his dizziness did subside which is promising as we were able to progress these activities. With MMT today his L lower trap was 3-/5 while the R was 3+/5, and the middle trap and rhomboids were 4+/5. Another promising sign is that he was able to tolerate prone long enough to perform MMT.  -EC        PT Plan    PT Plan Comments  Review HL t's and y's and assess his ability to tolerate VOR cancelation one added to his HEP.  -EC       User Key  (r) = Recorded By, (t) = Taken By, (c) = Cosigned By    Initials Name Provider Type    EC Eusebio Gonzalez, PTA Physical Therapy Assistant             OP Exercises     Row Name 20 1500             Subjective Comments    Subjective Comments  He reports soreness in between his shoulder blades, neck, and base of skull. He has some popping in his neck as the day progresses. He reports pain in the middle of the neck along with HA pain  -EC         Subjective Pain    Able to rate subjective pain?  yes  -EC       "Pre-Treatment Pain Level  5  -EC      Post-Treatment Pain Level  5  -EC         Total Minutes    43715 - PT Therapeutic Exercise Minutes  13  -EC      26282 -  PT Neuromuscular Reeducation Minutes  12  -EC      66798 - PT Manual Therapy Minutes  30  -EC         Exercise 1    Exercise Name 1  HL \"y's\" with green T band   -EC      Reps 1  15  -EC         Exercise 2    Exercise Name 2  HL \"t's with green T band  -EC      Reps 2  15  -EC         Exercise 3    Exercise Name 3  MMT B middle traps, lower traps and rhomboids  -EC      Additional Comments  see assessment comments  -EC         Exercise 4    Exercise Name 4  visual tracking user paced progressed to same with central fixation @ BITS  -EC        User Key  (r) = Recorded By, (t) = Taken By, (c) = Cosigned By    Initials Name Provider Type    Eusebio Marmolejo PTA Physical Therapy Assistant           Manual Rx (last 36 hours)      Manual Treatments     Row Name 12/08/20 1500             Total Minutes    11876 - PT Manual Therapy Minutes  30  -EC         Manual Rx 1    Manual Rx 1 Location  B cervical paraspinals, suboccipitals, and UT,  -EC      Manual Rx 1 Type  STM wih massage cream with pt in massage chair  -EC      Manual Rx 1 Duration  20  -EC         Manual Rx 2    Manual Rx 2 Location  Thoracic   -EC      Manual Rx 2 Type  extension mobilizations in massge chair   -EC      Manual Rx 2 Grade  2  -EC      Manual Rx 2 Duration  10  -EC        User Key  (r) = Recorded By, (t) = Taken By, (c) = Cosigned By    Initials Name Provider Type    Eusebio Marmolejo PTA Physical Therapy Assistant                    PT OP Goals     Row Name 12/08/20 1500          PT Short Term Goals    STG Date to Achieve  12/20/20  -EC     STG 1  Pt will demonstrate 50 degrees or greater (B) cervical rotaiton.   -EC     STG 1 Progress  Ongoing  -EC        Long Term Goals    LTG Date to Achieve  01/19/21  -EC     LTG 1  Pt will demonstrate 65 degrees or greater (B) cervical " tiffiton.   -EC     LTG 1 Progress  Ongoing  -EC     LTG 2  Pt will score 10 or less on the NDI.   -EC     LTG 2 Progress  Ongoing  -EC     LTG 3  Pt will be indpendent with HEP to include postural strengthening and vestibular exercises.   -EC     LTG 3 Progress  Ongoing  -EC     LTG 3 Progress Comments  added HL t's and y's today  -EC     LTG 4  Pt will report no dizziness with bending over for one week.   -EC     LTG 4 Progress  Ongoing  -EC     LTG 5  Pt will demonstrate SLS for 30 seconds or greater (B).   -EC     LTG 5 Progress  Ongoing  -EC        Time Calculation    PT Goal Re-Cert Due Date  12/20/20  -EC       User Key  (r) = Recorded By, (t) = Taken By, (c) = Cosigned By    Initials Name Provider Type    Eusebio Marmolejo PTA Physical Therapy Assistant          Therapy Education  Education Details: HL y's and t's with green T band x 15 each daily  Given: HEP, Symptoms/condition management, Posture/body mechanics  How Provided: Verbal, Demonstration  Provided to: Patient  Level of Understanding: Verbalized, Demonstrated              Time Calculation:                    Eusebio Gonzalez PTA  12/8/2020

## 2020-12-11 ENCOUNTER — TREATMENT (OUTPATIENT)
Dept: PHYSICAL THERAPY | Facility: CLINIC | Age: 35
End: 2020-12-11

## 2020-12-11 DIAGNOSIS — M54.2 CERVICALGIA: ICD-10-CM

## 2020-12-11 DIAGNOSIS — S06.0X1A CONCUSSION WITH LOSS OF CONSCIOUSNESS OF 30 MINUTES OR LESS, INITIAL ENCOUNTER: Primary | ICD-10-CM

## 2020-12-11 DIAGNOSIS — S09.90XA HEAD TRAUMA, INITIAL ENCOUNTER: ICD-10-CM

## 2020-12-11 PROCEDURE — 97140 MANUAL THERAPY 1/> REGIONS: CPT | Performed by: PHYSICAL THERAPIST

## 2020-12-11 PROCEDURE — 97112 NEUROMUSCULAR REEDUCATION: CPT | Performed by: PHYSICAL THERAPIST

## 2020-12-11 NOTE — PROGRESS NOTES
Outpatient Physical Therapy Vestibular Treatment Note       Patient Name: London Calabrese  : 1985  MRN: [id number]  Today's Date: 2020      Visit Date: 2020    Visit Dx:     ICD-10-CM ICD-9-CM   1. Concussion with loss of consciousness of 30 minutes or less, initial encounter  S06.0X1A 850.11   2. Cervicalgia  M54.2 723.1   3. Head trauma, initial encounter  S09.90XA 959.01       Patient Active Problem List   Diagnosis   • Cigarette smoker   • BMI 26.0-26.9,adult   • Head trauma, initial encounter   • Concussion with loss of consciousness of 30 minutes or less   • Post-traumatic headache   • Cervicalgia   • PTSD (post-traumatic stress disorder)                       PT Assessment/Plan     Row Name 20 1500          PT Assessment    Assessment Comments  He did have a little more symptom provocation today especially with activities at the BITS causing visual field distrubances that would quickly resolve with rests as needed. However, overall he tolerated the interventions well as by the end of the session he denied dizziness, nausea, or blurry vision.  -EC        PT Plan    PT Plan Comments  Assess his long term response to today's session and progress accordingly.  -EC       User Key  (r) = Recorded By, (t) = Taken By, (c) = Cosigned By    Initials Name Provider Type    EC Eusebio Gonzalez PTA Physical Therapy Assistant             OP Exercises     Row Name 20 1500 20 1300          Subjective Comments    Subjective Comments  --  He reports Wednesday night with HA and neck pain. He reports right now he has HA pain that isn't as bad and more tolerable  -EC        Subjective Pain    Able to rate subjective pain?  --  yes  -EC     Pre-Treatment Pain Level  --  5  -EC     Post-Treatment Pain Level  --  5  -EC        Total Minutes    63578 -  PT Neuromuscular Reeducation Minutes  --  30  -EC     41589 - PT Manual Therapy Minutes  15  -EC  --        Exercise 1    Exercise Name 1  --   VOR cancelation 1 with metronome 40 b.p.m  -EC     Sets 1  --  3  -EC     Additional Comments  --  alternated with 1<>2 eye movement rest breaks as needed between each to allow vision to recover  -EC        Exercise 2    Exercise Name 2  --  seated 1<>2 eye movement with metronome 40 b.p.m.   -EC     Sets 2  --  3  -EC     Time 2  --  1 min each  -EC     Additional Comments  --  progressed to 50 b.p.m. rest breaks between as needed to allow vision to recover.  -EC        Exercise 3    Exercise Name 3  --  visual scanning/visual pursuit at BITS L<>R progressed from 1 to 30 targets  -EC     Additional Comments  --  rests as needed between each   -EC       User Key  (r) = Recorded By, (t) = Taken By, (c) = Cosigned By    Initials Name Provider Type    EC Eusebio Gonzalez, PTA Physical Therapy Assistant           Manual Rx (last 36 hours)      Manual Treatments     Row Name 12/11/20 1500             Total Minutes    12844 - PT Manual Therapy Minutes  15  -EC         Manual Rx 1    Manual Rx 1 Location  cervical   -EC      Manual Rx 1 Type  suboccipital releases, side glides, manual traction, and traction with B lateral bends  -EC      Manual Rx 1 Grade  1  -EC      Manual Rx 1 Duration  15  -EC        User Key  (r) = Recorded By, (t) = Taken By, (c) = Cosigned By    Initials Name Provider Type    EC Eusebio Gonzalez, PTA Physical Therapy Assistant                    PT OP Goals     Row Name 12/11/20 1300          PT Short Term Goals    STG Date to Achieve  12/20/20  -EC     STG 1  Pt will demonstrate 50 degrees or greater (B) cervical rotaiton.   -EC     STG 1 Progress  Ongoing  -EC        Long Term Goals    LTG Date to Achieve  01/19/21  -EC     LTG 1  Pt will demonstrate 65 degrees or greater (B) cervical rotaiton.   -EC     LTG 1 Progress  Ongoing  -EC     LTG 2  Pt will score 10 or less on the NDI.   -EC     LTG 2 Progress  Ongoing  -EC     LTG 3  Pt will be indpendent with HEP to include postural strengthening  and vestibular exercises.   -EC     LTG 3 Progress  Ongoing  -EC     LTG 3 Progress Comments  added VOR 1 cancelation today  -EC     LTG 4  Pt will report no dizziness with bending over for one week.   -EC     LTG 4 Progress  Ongoing  -EC     LTG 5  Pt will demonstrate SLS for 30 seconds or greater (B).   -EC     LTG 5 Progress  Ongoing  -EC        Time Calculation    PT Goal Re-Cert Due Date  12/20/20  -EC       User Key  (r) = Recorded By, (t) = Taken By, (c) = Cosigned By    Initials Name Provider Type    Eusebio Marmolejo, PTA Physical Therapy Assistant          Therapy Education  Education Details: VOR 1 cancelation with metronome   Given: HEP, Symptoms/condition management, Posture/body mechanics  How Provided: Verbal  Provided to: Patient  Level of Understanding: Verbalized, Demonstrated              Time Calculation:                    Eusebio Gonzalez PTA  12/11/2020

## 2020-12-17 ENCOUNTER — TREATMENT (OUTPATIENT)
Dept: PHYSICAL THERAPY | Facility: CLINIC | Age: 35
End: 2020-12-17

## 2020-12-17 DIAGNOSIS — S06.0X1A CONCUSSION WITH LOSS OF CONSCIOUSNESS OF 30 MINUTES OR LESS, INITIAL ENCOUNTER: Primary | ICD-10-CM

## 2020-12-17 DIAGNOSIS — M54.2 CERVICALGIA: ICD-10-CM

## 2020-12-17 DIAGNOSIS — S09.90XA HEAD TRAUMA, INITIAL ENCOUNTER: ICD-10-CM

## 2020-12-17 PROCEDURE — 97140 MANUAL THERAPY 1/> REGIONS: CPT | Performed by: PHYSICAL THERAPIST

## 2020-12-17 PROCEDURE — 97530 THERAPEUTIC ACTIVITIES: CPT | Performed by: PHYSICAL THERAPIST

## 2020-12-17 PROCEDURE — 97112 NEUROMUSCULAR REEDUCATION: CPT | Performed by: PHYSICAL THERAPIST

## 2020-12-17 NOTE — PROGRESS NOTES
Outpatient Physical Therapy Neuro Progress Note       Patient Name: London Calabrese  : 1985  MRN: [id number]  Today's Date: 2020      Visit Date: 2020    Visit Dx:    ICD-10-CM ICD-9-CM   1. Concussion with loss of consciousness of 30 minutes or less, initial encounter  S06.0X1A 850.11   2. Cervicalgia  M54.2 723.1   3. Head trauma, initial encounter  S09.90XA 959.01       Patient Active Problem List   Diagnosis   • Cigarette smoker   • BMI 26.0-26.9,adult   • Head trauma, initial encounter   • Concussion with loss of consciousness of 30 minutes or less   • Post-traumatic headache   • Cervicalgia   • PTSD (post-traumatic stress disorder)                       PT Assessment/Plan     Row Name 20 0800          PT Assessment    Functional Limitations  Limitation in home management;Limitations in community activities;Performance in leisure activities;Performance in self-care ADL;Performance in work activities  -TB     Impairments  Muscle strength;Pain;Posture;Range of motion  -TB     Assessment Comments  Today he reported less pain and improvement with his daily HA symptoms but struggled a little more with activities at the [facility] today. His most significant challenge was visual scanning from R to L with narrow stance as he was at the verge of losing his balance multiple times. He has not met any of his goals at this time; however, due to his concussion his progress will likely be a saw tooth progression pattern.   -EC     Rehab Potential  Good  -TB     Patient/caregiver participated in establishment of treatment plan and goals  Yes  -TB     Patient would benefit from skilled therapy intervention  Yes  -TB        PT Plan    Predicted Duration of Therapy Intervention (PT)  8 visits  -TB     Planned CPT's?  PT RE-EVAL: 93573;PT THER PROC EA 15 MIN: 40922;PT THER ACT EA 15 MIN: 61737;PT MANUAL THERAPY EA 15 MIN: 90250;PT NEUROMUSC RE-EDUCATION EA 15 MIN: 79574;PT GAIT TRAINING EA 15 MIN: 56789;PT HOT OR  COLD PACK TREAT [facility];PT ELECTRICAL STIM UNATTEND: ;PT ELECTRICAL STIM ATTD EA 15 MIN: 79287;PT ULTRASOUND EA 15 MIN: 04523;PT TRACTION CERVICAL: 92916  -TB     PT Plan Comments  Continue to slowly increase the visual stimuli while also incorporating cognitive and static balance challenges. During the next session have him perform memory tasks at the [facility].  -EC       User Key  (r) = Recorded By, (t) = Taken By, (c) = Cosigned By    Initials Name Provider Type    TB Pete York, PT Physical Therapist    EC Eusebio Gonzalez, PTA Physical Therapy Assistant             OP Exercises     Row Name 12/17/20 0900 12/17/20 0800          Subjective Comments    Subjective Comments  --  He reports not sleeping well at night recently. He reports HA pain that was bad Tuesday night, still having them daily. He reports after the last session his HA pain has improved. He reports dizziness when he bends over but that is the only thing that makes him dizzy.   -EC        Subjective Pain    Able to rate subjective pain?  --  yes  -EC     Pre-Treatment Pain Level  --  4  -EC     Post-Treatment Pain Level  --  6  -EC        Total Minutes    35083 -  PT Neuromuscular Reeducation Minutes  --  24  -EC     11907 - PT Therapeutic Activity Minutes  --  20  -EC     37227 - PT Manual Therapy Minutes  15  -EC  --        Exercise 1    Exercise Name 1  --  reviewed goals for progress note (see goals section)  -EC        Exercise 2    Exercise Name 2  --  visual scanning @ [employer]  -     Additional Comments  --  progressed from qty 10 to 20 from RS to NS and had him perform L<>R  -EC        Exercise 3    Exercise Name 3  --  visual pursuit @ [facility]  -     Additional Comments  --  progressed by adding moderate frequency central fixation  -EC        Exercise 4    Exercise Name 4  --  obtained an updated NDI (see outcome measures section  -EC        Exercise 5    Exercise Name 5  --  sequencing alhabet, numbers, and colors in NS @ [facility]  -EC      Additional Comments  --  central fixation added to numbers  -EC        Exercise 6    Exercise Name 6  --  NS EO/EC  -EC     Time 6  --  30 sec each  -EC       User Key  (r) = Recorded By, (t) = Taken By, (c) = Cosigned By    Initials Name Provider Type    EC Eusebio Gonzalez, PTA Physical Therapy Assistant                     Manual Rx (last 36 hours)      Manual Treatments     Row Name 12/17/20 0900             Total Minutes    94861 - PT Manual Therapy Minutes  15  -EC         Manual Rx 1    Manual Rx 1 Location  cervical   -EC      Manual Rx 1 Type  suboccipital releases, side glides, manual traction, and traction with B lateral bends  -EC      Manual Rx 1 Grade  1 sustained  -EC      Manual Rx 1 Duration  15  -EC        User Key  (r) = Recorded By, (t) = Taken By, (c) = Cosigned By    Initials Name Provider Type    EC Eusebio Gonzalez, PTA Physical Therapy Assistant          PT OP Goals     Row Name 12/17/20 0800          PT Short Term Goals    STG Date to Achieve  12/20/20  -EC     STG 1  Pt will demonstrate 50 degrees or greater (B) cervical rotaiton.   -EC     STG 1 Progress  Ongoing  -EC     STG 1 Progress Comments  L rotation 49 degrees R 43 degrees AROM  -EC        Long Term Goals    LTG Date to Achieve  01/19/21  -EC     LTG 1  Pt will demonstrate 65 degrees or greater (B) cervical rotaiton.   -EC     LTG 1 Progress  Ongoing  -EC     LTG 1 Progress Comments  see STG #1  -EC     LTG 2  Pt will score 10 or less on the NDI.   -EC     LTG 2 Progress  Ongoing  -EC     LTG 2 Progress Comments  35 today  -EC     LTG 3  Pt will be indpendent with HEP to include postural strengthening and vestibular exercises.   -EC     LTG 3 Progress  Ongoing  -EC     LTG 3 Progress Comments  reports compliance  -EC     LTG 4  Pt will report no dizziness with bending over for one week.   -EC     LTG 4 Progress  Ongoing  -EC     LTG 4 Progress Comments  still having dizziness when bending over only  -EC     LTG 5  Pt will  demonstrate SLS for 30 seconds or greater (B).   -EC     LTG 5 Progress  Ongoing  -EC     LTG 5 Progress Comments  5 sec B LE  -EC        Time Calculation    PT Goal Re-Cert Due Date  01/16/21  -EC       User Key  (r) = Recorded By, (t) = Taken By, (c) = Cosigned By    Initials Name Provider Type    Eusebio Marmolejo, PTA Physical Therapy Assistant          Therapy Education  Given: HEP, Symptoms/condition management  Program: Reinforced  How Provided: Verbal, Demonstration  Provided to: Patient  Level of Understanding: Verbalized, Teach back education performed    Outcome Measure Options: Neck Disability Index (NDI)  Neck Disability Index  Section 1 - Pain Intensity: The pain is moderate at the moment.  Section 2 - Personal Care: I can look after myself normally, but it causes extra pain.  Section 3 - Lifting: I cannot lift or carry anything at all.  Section 4 - Work: I can hardly do any work at all.  Section 5 - Headaches: I have headaches almost all the time.  Section 6 - Concentration: I have a great deal of difficulty concentrating  Section 7 - Sleeping: My sleep is greatly disturbed for up to 3-5 hours.  Section 8 - Driving: I can't drive as long as I want because of moderate neck pain.  Section 9 - Reading: I can't read as much as I want because of moderate neck pain.  Section 10 - Recreation: I can hardly do recreational activities due to neck pain.  Neck Disability Index Score: 35      Time Calculation:           PT G-Codes  Outcome Measure Options: Neck Disability Index (NDI)  Neck Disability Index Score: 35         Pete York, PT  12/17/2020

## 2020-12-21 ENCOUNTER — TREATMENT (OUTPATIENT)
Dept: PHYSICAL THERAPY | Facility: CLINIC | Age: 35
End: 2020-12-21

## 2020-12-21 DIAGNOSIS — S06.0X1A CONCUSSION WITH LOSS OF CONSCIOUSNESS OF 30 MINUTES OR LESS, INITIAL ENCOUNTER: Primary | ICD-10-CM

## 2020-12-21 DIAGNOSIS — S09.90XA HEAD TRAUMA, INITIAL ENCOUNTER: ICD-10-CM

## 2020-12-21 DIAGNOSIS — M54.2 CERVICALGIA: ICD-10-CM

## 2020-12-21 PROCEDURE — 97112 NEUROMUSCULAR REEDUCATION: CPT | Performed by: PHYSICAL THERAPIST

## 2020-12-21 PROCEDURE — 97140 MANUAL THERAPY 1/> REGIONS: CPT | Performed by: PHYSICAL THERAPIST

## 2020-12-21 NOTE — PROGRESS NOTES
"    Outpatient Physical Therapy Vestibular Treatment Note       Patient Name: London Calabrese  : 1985  MRN: [id number]  Today's Date: 2020      Visit Date: 2020    Visit Dx:     ICD-10-CM ICD-9-CM   1. Concussion with loss of consciousness of 30 minutes or less, initial encounter  S06.0X1A 850.11   2. Cervicalgia  M54.2 723.1   3. Head trauma, initial encounter  S09.90XA 959.01       Patient Active Problem List   Diagnosis   • Cigarette smoker   • BMI 26.0-26.9,adult   • Head trauma, initial encounter   • Concussion with loss of consciousness of 30 minutes or less   • Post-traumatic headache   • Cervicalgia   • PTSD (post-traumatic stress disorder)                       PT Assessment/Plan     Row Name 20 1600          PT Assessment    Assessment Comments  Over time he was able to expand his visual tracking from midline to the R and his ability to toss and catch on the L impoved. However when I closed the distance and moved closer and when I switched to a smaller size ball his blink rate increased signficantly.   -EC        PT Plan    PT Plan Comments  Consider introducing VOR cancelation two to his regimen and consider adding it to his HEP if appropriate.  -EC       User Key  (r) = Recorded By, (t) = Taken By, (c) = Cosigned By    Initials Name Provider Type    Eusebio Marmolejo, PTA Physical Therapy Assistant             OP Exercises     Row Name 20 1500             Subjective Comments    Subjective Comments  He reports yesterday was \"awful\" but not sure why but he felt a lot of tension in his neck with a popping sensation in his neck when he turned his head to the R with an instant HA. He reports two small HA today but nothing as bad  -EC         Subjective Pain    Able to rate subjective pain?  yes  -EC      Pre-Treatment Pain Level  4  -EC         Total Minutes    00954 -  PT Neuromuscular Reeducation Minutes  30  -EC      21157 - PT Manual Therapy Minutes  15  -EC         " Exercise 1    Exercise Name 1  toss and catch varried throw speed, location progressing to smaller balls then same NS on blue foam  -EC         Exercise 2    Exercise Name 2  memory sequencing numbers, items, and letters @ [facility]  -EC        User Key  (r) = Recorded By, (t) = Taken By, (c) = Cosigned By    Initials Name Provider Type    EC Eusebio Gonzalez, PTA Physical Therapy Assistant           Manual Rx (last 36 hours)      Manual Treatments     Row Name 12/21/20 1500             Total Minutes    72725 - PT Manual Therapy Minutes  15  -EC         Manual Rx 1    Manual Rx 1 Location  cervical   -EC      Manual Rx 1 Type  suboccipital releases, side glides, manual traction, and traction with B lateral bends  -EC      Manual Rx 1 Grade  2 and 3  -EC      Manual Rx 1 Duration  15  -EC        User Key  (r) = Recorded By, (t) = Taken By, (c) = Cosigned By    Initials Name Provider Type    EC Eusebio Gonzalez, PTA Physical Therapy Assistant                    PT OP Goals     Row Name 12/21/20 1500          PT Short Term Goals    STG Date to Achieve  12/20/20  -EC     STG 1  Pt will demonstrate 50 degrees or greater (B) cervical rotaiton.   -EC     STG 1 Progress  Ongoing  -EC        Long Term Goals    LTG Date to Achieve  01/19/21  -EC     LTG 1  Pt will demonstrate 65 degrees or greater (B) cervical rotaiton.   -EC     LTG 1 Progress  Ongoing  -EC     LTG 2  Pt will score 10 or less on the NDI.   -EC     LTG 2 Progress  Ongoing  -EC     LTG 3  Pt will be indpendent with HEP to include postural strengthening and vestibular exercises.   -EC     LTG 3 Progress  Ongoing  -EC     LTG 4  Pt will report no dizziness with bending over for one week.   -EC     LTG 4 Progress  Ongoing  -EC     LTG 5  Pt will demonstrate SLS for 30 seconds or greater (B).   -EC     LTG 5 Progress  Ongoing  -EC        Time Calculation    PT Goal Re-Cert Due Date  01/16/21  -EC       User Key  (r) = Recorded By, (t) = Taken By, (c) = Cosigned By     Initials Name Provider Type     Eusebio Gonzalez PTA Physical Therapy Assistant          Therapy Education  Given: Symptoms/condition management  How Provided: Verbal  Provided to: Patient  Level of Understanding: Verbalized              Time Calculation:                    Eusebio Gonzalez PTA  12/21/2020

## 2020-12-23 ENCOUNTER — TREATMENT (OUTPATIENT)
Dept: PHYSICAL THERAPY | Facility: CLINIC | Age: 35
End: 2020-12-23

## 2020-12-23 DIAGNOSIS — S09.90XA HEAD TRAUMA, INITIAL ENCOUNTER: ICD-10-CM

## 2020-12-23 DIAGNOSIS — M54.2 CERVICALGIA: ICD-10-CM

## 2020-12-23 DIAGNOSIS — S06.0X1A CONCUSSION WITH LOSS OF CONSCIOUSNESS OF 30 MINUTES OR LESS, INITIAL ENCOUNTER: Primary | ICD-10-CM

## 2020-12-23 PROCEDURE — 97112 NEUROMUSCULAR REEDUCATION: CPT | Performed by: PHYSICAL THERAPIST

## 2020-12-23 PROCEDURE — 97140 MANUAL THERAPY 1/> REGIONS: CPT | Performed by: PHYSICAL THERAPIST

## 2020-12-23 NOTE — PROGRESS NOTES
Outpatient Physical Therapy Vestibular Treatment Note       Patient Name: London Calabrese  : 1985  MRN: [id number]  Today's Date: 2020      Visit Date: 2020    Visit Dx:     ICD-10-CM ICD-9-CM   1. Concussion with loss of consciousness of 30 minutes or less, initial encounter  S06.0X1A 850.11   2. Cervicalgia  M54.2 723.1   3. Head trauma, initial encounter  S09.90XA 959.01       Patient Active Problem List   Diagnosis   • Cigarette smoker   • BMI 26.0-26.9,adult   • Head trauma, initial encounter   • Concussion with loss of consciousness of 30 minutes or less   • Post-traumatic headache   • Cervicalgia   • PTSD (post-traumatic stress disorder)                       PT Assessment/Plan     Row Name 20 0800          PT Assessment    Assessment Comments  He is still having HA pain and reports activities such as watching T.V. flare his symptoms. The cognitive tasks were very challenging for him and more so with alphabetic tasks.  -EC        PT Plan    PT Plan Comments  Review VOR cancelation 2 and continue [facility] activities.  -EC       User Key  (r) = Recorded By, (t) = Taken By, (c) = Cosigned By    Initials Name Provider Type    EC Eusebio Gonzalez PTA Physical Therapy Assistant             OP Exercises     Row Name 20 0800 20 0700          Subjective Comments    Subjective Comments  He states he had a rough night last night with intense HA pain  -EC  --        Subjective Pain    Able to rate subjective pain?  yes  -EC  --     Pre-Treatment Pain Level  6  -EC  --     Post-Treatment Pain Level  5  -EC  --        Total Minutes    37701 -  PT Neuromuscular Reeducation Minutes  33  -EC  --     76853 - PT Manual Therapy Minutes  --  10  -EC        Exercise 1    Exercise Name 1  VOR cancelation 2 with metronome 40 bpm  -EC  --     Sets 1  4  -EC  --     Additional Comments   work rest  -EC  --        Exercise 2    Exercise Name 2  visual scanning @ [facility] progressed to 30 dots L<>R    -EC  --     Additional Comments  with L>R cognitive tasks in alphabetical order with R>L random  -EC  --       User Key  (r) = Recorded By, (t) = Taken By, (c) = Cosigned By    Initials Name Provider Type    Eusebio Marmolejo PTA Physical Therapy Assistant           Manual Rx (last 36 hours)      Manual Treatments     Row Name 12/23/20 0700             Total Minutes    09175 - PT Manual Therapy Minutes  10  -EC         Manual Rx 1    Manual Rx 1 Location  cervical   -EC      Manual Rx 1 Type  suboccipital releases,  manual traction, and traction with B lateral bends  -EC      Manual Rx 1 Grade  2 and 3  -EC      Manual Rx 1 Duration  10  -EC        User Key  (r) = Recorded By, (t) = Taken By, (c) = Cosigned By    Initials Name Provider Type    Eusebio Marmolejo PTA Physical Therapy Assistant                    PT OP Goals     Row Name 12/23/20 0800          PT Short Term Goals    STG Date to Achieve  12/20/20  -EC     STG 1  Pt will demonstrate 50 degrees or greater (B) cervical rotaiton.   -EC     STG 1 Progress  Ongoing  -EC        Long Term Goals    LTG Date to Achieve  01/19/21  -EC     LTG 1  Pt will demonstrate 65 degrees or greater (B) cervical rotaiton.   -EC     LTG 1 Progress  Ongoing  -EC     LTG 2  Pt will score 10 or less on the NDI.   -EC     LTG 2 Progress  Ongoing  -EC     LTG 3  Pt will be indpendent with HEP to include postural strengthening and vestibular exercises.   -EC     LTG 3 Progress  Ongoing  -EC     LTG 4  Pt will report no dizziness with bending over for one week.   -EC     LTG 4 Progress  Ongoing  -EC     LTG 5  Pt will demonstrate SLS for 30 seconds or greater (B).   -EC     LTG 5 Progress  Ongoing  -EC        Time Calculation    PT Goal Re-Cert Due Date  12/16/20  -EC       User Key  (r) = Recorded By, (t) = Taken By, (c) = Cosigned By    Initials Name Provider Type    Eusebio Marmolejo PTA Physical Therapy Assistant          Therapy Education  Education Details: VOR  cancelation 2 hourly 1/1 min work/rest  Given: HEP  Program: Progressed  How Provided: Verbal, Demonstration  Provided to: Patient  Level of Understanding: Verbalized              Time Calculation:                    Eusebio Gonzalez, PTA  12/23/2020

## 2020-12-29 ENCOUNTER — TREATMENT (OUTPATIENT)
Dept: PHYSICAL THERAPY | Facility: CLINIC | Age: 35
End: 2020-12-29

## 2020-12-29 DIAGNOSIS — M54.2 CERVICALGIA: ICD-10-CM

## 2020-12-29 DIAGNOSIS — S06.0X1A CONCUSSION WITH LOSS OF CONSCIOUSNESS OF 30 MINUTES OR LESS, INITIAL ENCOUNTER: Primary | ICD-10-CM

## 2020-12-29 DIAGNOSIS — S09.90XA HEAD TRAUMA, INITIAL ENCOUNTER: ICD-10-CM

## 2020-12-29 PROCEDURE — 97140 MANUAL THERAPY 1/> REGIONS: CPT | Performed by: PHYSICAL THERAPIST

## 2020-12-29 NOTE — PROGRESS NOTES
Outpatient Physical Therapy Vestibular Treatment Note       Patient Name: London Calabrese  : 1985  MRN: [id number]  Today's Date: 2020      Visit Date: 2020    Visit Dx:     ICD-10-CM ICD-9-CM   1. Concussion with loss of consciousness of 30 minutes or less, initial encounter  S06.0X1A 850.11   2. Cervicalgia  M54.2 723.1   3. Head trauma, initial encounter  S09.90XA 959.01       Patient Active Problem List   Diagnosis   • Cigarette smoker   • BMI 26.0-26.9,adult   • Head trauma, initial encounter   • Concussion with loss of consciousness of 30 minutes or less   • Post-traumatic headache   • Cervicalgia   • PTSD (post-traumatic stress disorder)                       PT Assessment/Plan     Row Name 20          PT Assessment    Assessment Comments  He was a little more flared today after having dental implants performed yesterday along with the increased auditory and visual stimuli over the [place] holiday,  -EC        PT Plan    PT Plan Comments  Consider static balance activities with EO and EC depending on his symptoms.  -EC       User Key  (r) = Recorded By, (t) = Taken By, (c) = Cosigned By    Initials Name Provider Type    Eusebio Marmolejo PTA Physical Therapy Assistant             OP Exercises     Row Name 20             Subjective Comments    Subjective Comments  He reports having two implants put in yesterday and he is exhausted. He reports his HA's happen everyday but they vary in severity. He had a bad HA [place] day due to all the noise and flashing lights. Still has dizziness when he bends over.   -EC         Subjective Pain    Able to rate subjective pain?  yes  -EC      Pre-Treatment Pain Level  7  -EC         Total Minutes    31831 - PT Manual Therapy Minutes  45  -EC        User Key  (r) = Recorded By, (t) = Taken By, (c) = Cosigned By    Initials Name Provider Type    Eusebio Marmolejo PTA Physical Therapy Assistant           Manual Rx (last 36  hours)      Manual Treatments     Row Name 12/29/20 0900             Total Minutes    75863 - PT Manual Therapy Minutes  45  -EC         Manual Rx 1    Manual Rx 1 Location  B cervical paraspinals, suboccipitals, UT, and LS  -EC      Manual Rx 1 Type  STM wih massage cream with pt in massage chair  -EC      Manual Rx 1 Grade  min  -EC      Manual Rx 1 Duration  25  -EC         Manual Rx 2    Manual Rx 2 Location  Thoracic   -EC      Manual Rx 2 Type  extension mobilizations in massge chair   -EC      Manual Rx 2 Grade  2  -EC      Manual Rx 2 Duration  10  -EC         Manual Rx 3    Manual Rx 3 Location  cervical  -EC      Manual Rx 3 Type  suboccipital releases, manual traction, and traction with B lateral bends  -EC      Manual Rx 3 Grade  2  -EC      Manual Rx 3 Duration  10  -EC        User Key  (r) = Recorded By, (t) = Taken By, (c) = Cosigned By    Initials Name Provider Type    EC Eusebio Gonzalez, LINDSEY Physical Therapy Assistant                                       Time Calculation:                    Eusebio Gonzalez PTA  12/29/2020

## 2020-12-31 ENCOUNTER — TREATMENT (OUTPATIENT)
Dept: PHYSICAL THERAPY | Facility: CLINIC | Age: 35
End: 2020-12-31

## 2020-12-31 DIAGNOSIS — M54.2 CERVICALGIA: ICD-10-CM

## 2020-12-31 DIAGNOSIS — S06.0X1A CONCUSSION WITH LOSS OF CONSCIOUSNESS OF 30 MINUTES OR LESS, INITIAL ENCOUNTER: Primary | ICD-10-CM

## 2020-12-31 DIAGNOSIS — S09.90XA HEAD TRAUMA, INITIAL ENCOUNTER: ICD-10-CM

## 2020-12-31 PROCEDURE — 97140 MANUAL THERAPY 1/> REGIONS: CPT | Performed by: PHYSICAL THERAPIST

## 2020-12-31 NOTE — PROGRESS NOTES
Outpatient Physical Therapy Vestibular Treatment Note       Patient Name: London Calabrese  : 1985  MRN: [id number]  Today's Date: 2020      Visit Date: 2020    Visit Dx:     ICD-10-CM ICD-9-CM   1. Concussion with loss of consciousness of 30 minutes or less, initial encounter  S06.0X1A 850.11   2. Cervicalgia  M54.2 723.1   3. Head trauma, initial encounter  S09.90XA 959.01       Patient Active Problem List   Diagnosis   • Cigarette smoker   • BMI 26.0-26.9,adult   • Head trauma, initial encounter   • Concussion with loss of consciousness of 30 minutes or less   • Post-traumatic headache   • Cervicalgia   • PTSD (post-traumatic stress disorder)                       PT Assessment/Plan     Row Name 20 1200          PT Assessment    Assessment Comments  He has been a little more flared especially in regard to his neck pain symptoms, thus I focused on manual techniques today.  -EC        PT Plan    PT Plan Comments  Continue to progress as symptoms allow.  -EC       User Key  (r) = Recorded By, (t) = Taken By, (c) = Cosigned By    Initials Name Provider Type    Eusebio Marmolejo PTA Physical Therapy Assistant             OP Exercises     Row Name 20 1000 20 0900          Subjective Comments    Subjective Comments  He reports neck pain and that he had a bad migraine last night  -EC  --        Subjective Pain    Able to rate subjective pain?  yes  -EC  --     Pre-Treatment Pain Level  6  -EC  --        Total Minutes    23976 - PT Manual Therapy Minutes  --  40  -EC       User Key  (r) = Recorded By, (t) = Taken By, (c) = Cosigned By    Initials Name Provider Type    Eusebio Mamrolejo PTA Physical Therapy Assistant           Manual Rx (last 36 hours)      Manual Treatments     Row Name 20 0900             Total Minutes    59674 - PT Manual Therapy Minutes  40  -EC         Manual Rx 1    Manual Rx 1 Location  mid thoracic region  -EC      Manual Rx 1 Type  STM with  ratchet roller in prone  -EC      Manual Rx 1 Duration  10  -EC         Manual Rx 2    Manual Rx 2 Location  Thoracic   -EC      Manual Rx 2 Type  extension mobilizations in massge chair   -EC      Manual Rx 2 Grade  2 sustained  -EC      Manual Rx 2 Duration  10  -EC         Manual Rx 3    Manual Rx 3 Location  B UT, LS, and cervical paraspinals  -EC      Manual Rx 3 Type  STM in sitting leand forward over an elevated treatment table  -EC      Manual Rx 3 Duration  10  -EC         Manual Rx 4    Manual Rx 4 Location  cervical  -EC      Manual Rx 4 Type  subocciptal releases, manual traction, traction with B lateral bends  -EC      Manual Rx 4 Grade  2 and 3  -EC      Manual Rx 4 Duration  10  -EC        User Key  (r) = Recorded By, (t) = Taken By, (c) = Cosigned By    Initials Name Provider Type    EC Eusebio Gonzalez PTA Physical Therapy Assistant                    PT OP Goals     Row Name 12/31/20 1000          PT Short Term Goals    STG Date to Achieve  12/20/20  -EC     STG 1  Pt will demonstrate 50 degrees or greater (B) cervical rotaiton.   -EC     STG 1 Progress  Ongoing  -EC        Long Term Goals    LTG Date to Achieve  01/19/21  -EC     LTG 1  Pt will demonstrate 65 degrees or greater (B) cervical rotaiton.   -EC     LTG 1 Progress  Ongoing  -EC     LTG 2  Pt will score 10 or less on the NDI.   -EC     LTG 2 Progress  Ongoing  -EC     LTG 3  Pt will be indpendent with HEP to include postural strengthening and vestibular exercises.   -EC     LTG 3 Progress  Ongoing  -EC     LTG 4  Pt will report no dizziness with bending over for one week.   -EC     LTG 4 Progress  Ongoing  -EC     LTG 5  Pt will demonstrate SLS for 30 seconds or greater (B).   -EC     LTG 5 Progress  Ongoing  -EC        Time Calculation    PT Goal Re-Cert Due Date  01/16/21  -EC       User Key  (r) = Recorded By, (t) = Taken By, (c) = Cosigned By    Initials Name Provider Type    Eusebio Marmolejo PTA Physical Therapy Assistant           Therapy Education  Given: HEP  Program: Reinforced  How Provided: Verbal  Provided to: Patient  Level of Understanding: Verbalized              Time Calculation:                    Eusebio Gonzalez, PTA  12/31/2020

## 2021-01-04 ENCOUNTER — TREATMENT (OUTPATIENT)
Dept: PHYSICAL THERAPY | Facility: CLINIC | Age: 36
End: 2021-01-04

## 2021-01-04 DIAGNOSIS — M54.2 CERVICALGIA: ICD-10-CM

## 2021-01-04 DIAGNOSIS — S06.0X1A CONCUSSION WITH LOSS OF CONSCIOUSNESS OF 30 MINUTES OR LESS, INITIAL ENCOUNTER: Primary | ICD-10-CM

## 2021-01-04 DIAGNOSIS — S09.90XA HEAD TRAUMA, INITIAL ENCOUNTER: ICD-10-CM

## 2021-01-04 PROCEDURE — 97112 NEUROMUSCULAR REEDUCATION: CPT | Performed by: PHYSICAL THERAPIST

## 2021-01-04 PROCEDURE — 97140 MANUAL THERAPY 1/> REGIONS: CPT | Performed by: PHYSICAL THERAPIST

## 2021-01-04 PROCEDURE — 97530 THERAPEUTIC ACTIVITIES: CPT | Performed by: PHYSICAL THERAPIST

## 2021-01-06 ENCOUNTER — TREATMENT (OUTPATIENT)
Dept: PHYSICAL THERAPY | Facility: CLINIC | Age: 36
End: 2021-01-06

## 2021-01-06 DIAGNOSIS — S09.90XA HEAD TRAUMA, INITIAL ENCOUNTER: ICD-10-CM

## 2021-01-06 DIAGNOSIS — S06.0X1A CONCUSSION WITH LOSS OF CONSCIOUSNESS OF 30 MINUTES OR LESS, INITIAL ENCOUNTER: Primary | ICD-10-CM

## 2021-01-06 DIAGNOSIS — M54.2 CERVICALGIA: ICD-10-CM

## 2021-01-06 PROCEDURE — 97112 NEUROMUSCULAR REEDUCATION: CPT | Performed by: PHYSICAL THERAPIST

## 2021-01-06 NOTE — PROGRESS NOTES
"Outpatient Physical Therapy Ortho Treatment Note       Patient Name: London Calabrese  : 1985  MRN: [id number]  Today's Date: 2021      Visit Date: 2021    Visit Dx:    ICD-10-CM ICD-9-CM   1. Concussion with loss of consciousness of 30 minutes or less, initial encounter  S06.0X1A 850.11   2. Cervicalgia  M54.2 723.1   3. Head trauma, initial encounter  S09.90XA 959.01       Patient Active Problem List   Diagnosis   • Cigarette smoker   • BMI 26.0-26.9,adult   • Head trauma, initial encounter   • Concussion with loss of consciousness of 30 minutes or less   • Post-traumatic headache   • Cervicalgia   • PTSD (post-traumatic stress disorder)        Past Medical History:   Diagnosis Date   • Brain concussion     work injury in 2020   • Headache    • Neck pain     from the work injury in 2020        Past Surgical History:   Procedure Laterality Date   • NO PAST SURGERIES                         PT Assessment/Plan     Row Name 21 1400          PT Assessment    Assessment Comments  He did not present with as much difficulty maintaining his balance today but static balance activities with EC is the most difficult for him.  -EC        PT Plan    PT Plan Comments  Await continued authorizations and continue utiizing the BITS for visual stimuli to reduce his post concusion symptoms.  -EC       User Key  (r) = Recorded By, (t) = Taken By, (c) = Cosigned By    Initials Name Provider Type    EC Eusebio Gonzalez, PTA Physical Therapy Assistant            OP Exercises     Row Name 21 1300             Subjective Comments    Subjective Comments  He reports not sleeping well last night. He reports a little nasues today and \"feeling out there.\"  -EC         Subjective Pain    Able to rate subjective pain?  yes  -EC      Pre-Treatment Pain Level  4  -EC         Exercise 1    Exercise Name 1  visual scanning 10 squares speed 2 R to L in standing @ BITS  -EC      Sets 1  2  -EC         Exercise 2    " Exercise Name 2  visual scanning 30 squares L to R speed 4 in sittiing   -EC         Exercise 3    Exercise Name 3  visual scanning L to R speed 4 in standing   -EC         Exercise 4    Exercise Name 4  visual scanning 60 squares speed 4 in sitting   -EC         Exercise 5    Exercise Name 5  B staggered sstance EO/EC  -EC         Exercise 6    Exercise Name 6  NS EO/EC  -EC      Sets 6  2  -EC        User Key  (r) = Recorded By, (t) = Taken By, (c) = Cosigned By    Initials Name Provider Type    EC Eusebio Gonzalez PTA Physical Therapy Assistant                       PT OP Goals     Row Name 01/06/21 1400          PT Short Term Goals    STG Date to Achieve  01/20/21  -EC     STG 1  Pt will demonstrate 50 degrees or greater (B) cervical rotaiton.   -EC     STG 1 Progress  Ongoing  -EC        Long Term Goals    LTG Date to Achieve  02/03/21  -EC     LTG 1  Pt will demonstrate 65 degrees or greater (B) cervical rotaiton.   -EC     LTG 1 Progress  Ongoing  -EC     LTG 2  Pt will score 10 or less on the NDI.   -EC     LTG 2 Progress  Ongoing  -EC     LTG 3  Pt will be indpendent with HEP to include postural strengthening and vestibular exercises.   -EC     LTG 3 Progress  Ongoing  -EC     LTG 4  Pt will report no dizziness with bending over for one week.   -EC     LTG 4 Progress  Ongoing  -EC     LTG 5  Pt will demonstrate SLS for 30 seconds or greater (B).   -EC     LTG 5 Progress  Ongoing  -EC        Time Calculation    PT Goal Re-Cert Due Date  02/03/21  -EC       User Key  (r) = Recorded By, (t) = Taken By, (c) = Cosigned By    Initials Name Provider Type    Eusebio Marmolejo PTA Physical Therapy Assistant                         Time Calculation:                    Eusebio Gonzalez PTA  1/6/2021

## 2021-01-13 ENCOUNTER — TELEPHONE (OUTPATIENT)
Dept: NEUROSURGERY | Facility: CLINIC | Age: 36
End: 2021-01-13

## 2021-01-13 NOTE — TELEPHONE ENCOUNTER
Called to confirm patient's appt with Dr Orona on 1/14/21  - no answer so left a message to call me back      carol saleem CMA

## 2021-01-14 ENCOUNTER — OFFICE VISIT (OUTPATIENT)
Dept: NEUROSURGERY | Facility: CLINIC | Age: 36
End: 2021-01-14

## 2021-01-14 VITALS
HEIGHT: 69 IN | DIASTOLIC BLOOD PRESSURE: 78 MMHG | SYSTOLIC BLOOD PRESSURE: 128 MMHG | WEIGHT: 175.4 LBS | BODY MASS INDEX: 25.98 KG/M2

## 2021-01-14 DIAGNOSIS — M54.2 CERVICALGIA: ICD-10-CM

## 2021-01-14 DIAGNOSIS — G44.319 ACUTE POST-TRAUMATIC HEADACHE, NOT INTRACTABLE: ICD-10-CM

## 2021-01-14 DIAGNOSIS — F43.10 PTSD (POST-TRAUMATIC STRESS DISORDER): ICD-10-CM

## 2021-01-14 DIAGNOSIS — S06.0X1D CONCUSSION WITH LOSS OF CONSCIOUSNESS OF 30 MINUTES OR LESS, SUBSEQUENT ENCOUNTER: Primary | ICD-10-CM

## 2021-01-14 DIAGNOSIS — F17.210 CIGARETTE SMOKER: ICD-10-CM

## 2021-01-14 PROCEDURE — 99213 OFFICE O/P EST LOW 20 MIN: CPT | Performed by: NEUROLOGICAL SURGERY

## 2021-01-14 NOTE — PROGRESS NOTES
SUBJECTIVE:  Patient ID: London Calabrese is a 35 y.o. male is here today for follow-up.    Chief Complaint: Postconcussion syndrome  Chief Complaint   Patient presents with   • Concussion     patient is here for a 2 month follow up; patient has been doing therapy w/[facility] and states he thinks he is making progress although he still has trouble with memory/concentration; patient also had CT scans neck & face @ [facility] on 12/14/20.  Patient had a work injury in 3/2020 w/+LOC.       HPI  35-year-old gentleman that was in a work-related accident where he suffered a head injury.  We have had him in postconcussion rehab he has been taking Fioricet muscle relaxer for the headaches and neck pain.  We did a series of imaging to complete his injury work-up he is here to discuss results.  We have kept him out of work since the end of November.  He has been engaging in no strenuous activities other than therapy sessions.  He is done 12 therapy sessions he thinks are being very helpful.  They are working on his memory balance and coordination.  He still is getting headaches although they are significantly improved and not as frequent.  However he is very clear that if he engages in strenuous activity or any rehab that involves lifting or exercise he will precipitate muscle tension neck pain shoulder pain and a headache.    The following portions of the patient's history were reviewed and updated as appropriate: allergies, current medications, past family history, past medical history, past social history, past surgical history and problem list.    OBJECTIVE:    Review of Systems   Musculoskeletal: Positive for myalgias and neck pain.   Neurological: Positive for headaches.   All other systems reviewed and are negative.         Physical Exam  Eyes:      Extraocular Movements: EOM normal.      Pupils: Pupils are equal, round, and reactive to light.   Neurological:      Mental Status: He is oriented to person, place, and time.       Coordination: Finger-Nose-Finger Test normal.      Gait: Gait is intact.      Deep Tendon Reflexes: Strength normal.   Psychiatric:         Speech: Speech normal.         Neurologic Exam     Mental Status   Oriented to person, place, and time.   Attention: normal.   Speech: speech is normal   Level of consciousness: alert  Knowledge: good.     Cranial Nerves     CN II   Visual fields full to confrontation.     CN III, IV, VI   Pupils are equal, round, and reactive to light.  Extraocular motions are normal.     CN V   Facial sensation intact.     CN VII   Facial expression full, symmetric.     CN VIII   CN VIII normal.     CN IX, X   CN IX normal.   CN X normal.     CN XI   CN XI normal.     CN XII   CN XII normal.     Motor Exam   Muscle bulk: normal  Overall muscle tone: normal  Right arm pronator drift: absent  Left arm pronator drift: absent    Strength   Strength 5/5 throughout.     Sensory Exam   Light touch normal.   Pinprick normal.     Gait, Coordination, and Reflexes     Gait  Gait: normal    Coordination   Finger to nose coordination: normal    Tremor   Resting tremor: absent  Intention tremor: absent  Action tremor: absent    Reflexes   Reflexes 2+ except as noted.       Independent Review of Radiographic Studies:   CT scan of the neck shows no bony abnormality or alignment issue.  CT scan of the brain shows no acute issue.  CT scan of the facial bones shows the injury is consistent with his teeth avulsions and a small maxillary fracture.    ASSESSMENT/PLAN:  The patient is still suffering from postconcussive syndrome.  He still gets symptomatic with exertion.  Therapy, work and activity restrictions and the medication have have been helpful and I think have been on the road to recovery.  We will renew his therapy order in his medications and we will see him in follow-up in about 6 weeks.      1. Concussion with loss of consciousness of 30 minutes or less, subsequent encounter    2. PTSD (post-traumatic  stress disorder)    3. Acute post-traumatic headache, not intractable    4. Cervicalgia    5. Cigarette smoker    6. BMI 25.0-25.9,adult        The patient's Body mass index is 25.9 kg/m².. BMI is above normal parameters. Recommendations include: educational material    Return in about 6 weeks (around 2/25/2021) for FOLLOW UP W/JOJO (15 MINS).      Anthony Orona MD

## 2021-01-14 NOTE — PATIENT INSTRUCTIONS
"PATIENT TO CONTINUE TO FOLLOW UP WITH HIS PRIMARY CARE PROVIDER FOR YEARLY PHYSICAL EXAMS TO ENSURE COMPLETE HEALTH MAINTENANCE        BMI for Adults  What is BMI?  Body mass index (BMI) is a number that is calculated from a person's weight and height. BMI can help estimate how much of a person's weight is composed of fat. BMI does not measure body fat directly. Rather, it is an alternative to procedures that directly measure body fat, which can be difficult and expensive.  BMI can help identify people who may be at higher risk for certain medical problems.  What are BMI measurements used for?  BMI is used as a screening tool to identify possible weight problems. It helps determine whether a person is obese, overweight, a healthy weight, or underweight.  BMI is useful for:  · Identifying a weight problem that may be related to a medical condition or may increase the risk for medical problems.  · Promoting changes, such as changes in diet and exercise, to help reach a healthy weight. BMI screening can be repeated to see if these changes are working.  How is BMI calculated?  BMI involves measuring your weight in relation to your height. Both height and weight are measured, and the BMI is calculated from those numbers. This can be done either in English ([place] or metric measurements. Note that charts and online BMI calculators are available to help you find your BMI quickly and easily without having to do these calculations yourself.  To calculate your BMI in English ([place] measurements:    1. Measure your weight in pounds (lb).  2. Multiply the number of pounds by 703.  ? For example, for a person who weighs 180 lb, multiply that number by 703, which equals 126,540.  3. Measure your height in inches. Then multiply that number by itself to get a measurement called \"inches squared.\"  ? For example, for a person who is 70 inches tall, the \"inches squared\" measurement is 70 inches x 70 inches, which equals 4,900 inches " "squared.  4. Divide the total from step 2 (number of lb x 703) by the total from step 3 (inches squared): 126,540 ÷ 4,900 = 25.8. This is your BMI.  To calculate your BMI in metric measurements:  1. Measure your weight in kilograms (kg).  2. Measure your height in meters (m). Then multiply that number by itself to get a measurement called \"meters squared.\"  ? For example, for a person who is 1.75 m tall, the \"meters squared\" measurement is 1.75 m x 1.75 m, which is equal to 3.1 meters squared.  3. Divide the number of kilograms (your weight) by the meters squared number. In this example: 70 ÷ 3.1 = 22.6. This is your BMI.  What do the results mean?  BMI charts are used to identify whether you are underweight, normal weight, overweight, or obese. The following guidelines will be used:  · Underweight: BMI less than 18.5.  · Normal weight: BMI between 18.5 and 24.9.  · Overweight: BMI between 25 and 29.9.  · Obese: BMI of 30 or above.  Keep these notes in mind:  · Weight includes both fat and muscle, so someone with a muscular build, such as an athlete, may have a BMI that is higher than 24.9. In cases like these, BMI is not an accurate measure of body fat.  · To determine if excess body fat is the cause of a BMI of 25 or higher, further assessments may need to be done by a health care provider.  · BMI is usually interpreted in the same way for men and women.  Where to find more information  For more information about BMI, including tools to quickly calculate your BMI, go to these websites:  · Centers for Disease Control and Prevention: www.cdc.gov  · American Heart Association: www.heart.org  · National Heart, Lung, and Blood [place]: www.nhlbi.nih.gov  Summary  · Body mass index (BMI) is a number that is calculated from a person's weight and height.  · BMI may help estimate how much of a person's weight is composed of fat. BMI can help identify those who may be at higher risk for certain medical problems.  · BMI " "can be measured using English measurements or metric measurements.  · BMI charts are used to identify whether you are underweight, normal weight, overweight, or obese.  This information is not intended to replace advice given to you by your health care provider. Make sure you discuss any questions you have with your health care provider.  Document Revised: 09/09/2020 Document Reviewed: 07/17/2020  [employer] Patient Education © 2020 [employer] Inc[employer]      DASH Eating Plan  DASH stands for \"Dietary Approaches to Stop Hypertension.\" The DASH eating plan is a healthy eating plan that has been shown to reduce high blood pressure (hypertension). It may also reduce your risk for type 2 diabetes, heart disease, and stroke. The DASH eating plan may also help with weight loss.  What are tips for following this plan?    General guidelines  · Avoid eating more than 2,300 mg (milligrams) of salt (sodium) a day. If you have hypertension, you may need to reduce your sodium intake to 1,500 mg a day.  · Limit alcohol intake to no more than 1 drink a day for nonpregnant women and 2 drinks a day for men. One drink equals 12 oz of beer, 5 oz of wine, or 1½ oz of hard liquor.  · Work with your health care provider to maintain a healthy body weight or to lose weight. Ask what an ideal weight is for you.  · Get at least 30 minutes of exercise that causes your heart to beat faster (aerobic exercise) most days of the week. Activities may include walking, swimming, or biking.  · Work with your health care provider or diet and nutrition specialist (dietitian) to adjust your eating plan to your individual calorie needs.  Reading food labels    · Check food labels for the amount of sodium per serving. Choose foods with less than 5 percent of the Daily Value of sodium. Generally, foods with less than 300 mg of sodium per serving fit into this eating plan.  · To find whole grains, look for the word \"whole\" as the first word in the ingredient " "list.  Shopping  · Buy products labeled as \"low-sodium\" or \"no salt added.\"  · Buy fresh foods. Avoid canned foods and premade or frozen meals.  Cooking  · Avoid adding salt when cooking. Use salt-free seasonings or herbs instead of table salt or sea salt. Check with your health care provider or pharmacist before using salt substitutes.  · Do not snider foods. Cook foods using healthy methods such as baking, boiling, grilling, and broiling instead.  · Cook with heart-healthy oils, such as olive, canola, soybean, or sunflower oil.  Meal planning  · Eat a balanced diet that includes:  ? 5 or more servings of fruits and vegetables each day. At each meal, try to fill half of your plate with fruits and vegetables.  ? Up to 6-8 servings of whole grains each day.  ? Less than 6 oz of lean meat, poultry, or fish each day. A 3-oz serving of meat is about the same size as a deck of cards. One egg equals 1 oz.  ? 2 servings of low-fat dairy each day.  ? A serving of nuts, seeds, or beans 5 times each week.  ? Heart-healthy fats. Healthy fats called Omega-3 fatty acids are found in foods such as flaxseeds and coldwater fish, like sardines, salmon, and mackerel.  · Limit how much you eat of the following:  ? Canned or prepackaged foods.  ? Food that is high in trans fat, such as fried foods.  ? Food that is high in saturated fat, such as fatty meat.  ? Sweets, desserts, sugary drinks, and other foods with added sugar.  ? Full-fat dairy products.  · Do not salt foods before eating.  · Try to eat at least 2 vegetarian meals each week.  · Eat more home-cooked food and less restaurant, buffet, and fast food.  · When eating at a restaurant, ask that your food be prepared with less salt or no salt, if possible.  What foods are recommended?  The items listed may not be a complete list. Talk with your dietitian about what dietary choices are best for you.  Grains  Whole-grain or whole-wheat bread. Whole-grain or whole-wheat pasta. Brown " rice. Oatmeal. Quinoa. Bulgur. Whole-grain and low-sodium cereals. Sandra bread. Low-fat, low-sodium crackers. Whole-wheat flour tortillas.  Vegetables  Fresh or frozen vegetables (raw, steamed, roasted, or grilled). Low-sodium or reduced-sodium tomato and vegetable juice. Low-sodium or reduced-sodium tomato sauce and tomato paste. Low-sodium or reduced-sodium canned vegetables.  Fruits  All fresh, dried, or frozen fruit. Canned fruit in natural juice (without added sugar).  Meat and other protein foods  Skinless chicken or turkey. Ground chicken or turkey. Pork with fat trimmed off. Fish and seafood. Egg whites. Dried beans, peas, or lentils. Unsalted nuts, nut butters, and seeds. Unsalted canned beans. Lean cuts of beef with fat trimmed off. Low-sodium, lean deli meat.  Dairy  Low-fat (1%) or fat-free (skim) milk. Fat-free, low-fat, or reduced-fat cheeses. Nonfat, low-sodium ricotta or cottage cheese. Low-fat or nonfat yogurt. Low-fat, low-sodium cheese.  Fats and oils  Soft margarine without trans fats. Vegetable oil. Low-fat, reduced-fat, or light mayonnaise and salad dressings (reduced-sodium). Canola, safflower, olive, soybean, and sunflower oils. Avocado.  Seasoning and other foods  Herbs. Spices. Seasoning mixes without salt. Unsalted popcorn and pretzels. Fat-free sweets.  What foods are not recommended?  The items listed may not be a complete list. Talk with your dietitian about what dietary choices are best for you.  Grains  Baked goods made with fat, such as croissants, muffins, or some breads. Dry pasta or rice meal packs.  Vegetables  Creamed or fried vegetables. Vegetables in a cheese sauce. Regular canned vegetables (not low-sodium or reduced-sodium). Regular canned tomato sauce and paste (not low-sodium or reduced-sodium). Regular tomato and vegetable juice (not low-sodium or reduced-sodium). Pickles. Olives.  Fruits  Canned fruit in a light or heavy syrup. Fried fruit. Fruit in cream or butter  sauce.  Meat and other protein foods  Fatty cuts of meat. Ribs. Fried meat. Shannon. Sausage. Bologna and other processed lunch meats. Salami. Fatback. Hotdogs. Bratwurst. Salted nuts and seeds. Canned beans with added salt. Canned or smoked fish. Whole eggs or egg yolks. Chicken or turkey with skin.  Dairy  Whole or 2% milk, cream, and half-and-half. Whole or full-fat cream cheese. Whole-fat or sweetened yogurt. Full-fat cheese. Nondairy creamers. Whipped toppings. Processed cheese and cheese spreads.  Fats and oils  Butter. Stick margarine. Lard. Shortening. Ghee. Shannon fat. Tropical oils, such as coconut, palm kernel, or palm oil.  Seasoning and other foods  Salted popcorn and pretzels. Onion salt, garlic salt, seasoned salt, table salt, and sea salt. Worcestershire sauce. Tartar sauce. Barbecue sauce. Teriyaki sauce. Soy sauce, including reduced-sodium. Steak sauce. Canned and packaged gravies. Fish sauce. Oyster sauce. Cocktail sauce. Horseradish that you find on the shelf. Ketchup. Mustard. Meat flavorings and tenderizers. Bouillon cubes. Hot sauce and Tabasco sauce. Premade or packaged marinades. Premade or packaged taco seasonings. Relishes. Regular salad dressings.  Where to find more information:  · National Heart, Lung, and Blood [place]: www.nhlbi.nih.gov  · American Heart Association: www.heart.org  Summary  · The DASH eating plan is a healthy eating plan that has been shown to reduce high blood pressure (hypertension). It may also reduce your risk for type 2 diabetes, heart disease, and stroke.  · With the DASH eating plan, you should limit salt (sodium) intake to 2,300 mg a day. If you have hypertension, you may need to reduce your sodium intake to 1,500 mg a day.  · When on the DASH eating plan, aim to eat more fresh fruits and vegetables, whole grains, lean proteins, low-fat dairy, and heart-healthy fats.  · Work with your health care provider or diet and nutrition specialist (dietitian) to adjust  your eating plan to your individual calorie needs.  This information is not intended to replace advice given to you by your health care provider. Make sure you discuss any questions you have with your health care provider.  Document Revised: 11/30/2018 Document Reviewed: 12/11/2017  [employer] Patient Education © 2020 [employer]      Steps to Quit Smoking  Smoking tobacco is the leading cause of preventable death. It can affect almost every organ in the body. Smoking puts you and those around you at risk for developing many serious chronic diseases. Quitting smoking can be difficult, but it is one of the best things that you can do for your health. It is never too late to quit.  How do I get ready to quit?  When you decide to quit smoking, create a plan to help you succeed. Before you quit:  · Pick a date to quit. Set a date within the next 2 weeks to give you time to prepare.  · Write down the reasons why you are quitting. Keep this list in places where you will see it often.  · Tell your family, friends, and co-workers that you are quitting. Support from your loved ones can make quitting easier.  · Talk with your health care provider about your options for quitting smoking.  · Find out what treatment options are covered by your health insurance.  · Identify people, places, things, and activities that make you want to smoke (triggers). Avoid them.  What first steps can I take to quit smoking?  · Throw away all cigarettes at home, at work, and in your car.  · Throw away smoking accessories, such as ashtrays and lighters.  · Clean your car. Make sure to empty the ashtray.  · Clean your home, including curtains and carpets.  What strategies can I use to quit smoking?  Talk with your health care provider about combining strategies, such as taking medicines while you are also receiving in-person counseling. Using these two strategies together makes you more likely to succeed in quitting than if you used either strategy  on its own.  · If you are pregnant or breastfeeding, talk with your health care provider about finding counseling or other support strategies to quit smoking. Do not take medicine to help you quit smoking unless your health care provider tells you to do so.  To quit smoking:  Quit right away  · Quit smoking completely, instead of gradually reducing how much you smoke over a period of time. Research shows that stopping smoking right away is more successful than gradually quitting.  · Attend in-person counseling to help you build problem-solving skills. You are more likely to succeed in quitting if you attend counseling sessions regularly. Even short sessions of 10 minutes can be effective.  Take medicine  You may take medicines to help you quit smoking. Some medicines require a prescription and some you can purchase over-the-counter. Medicines may have nicotine in them to replace the nicotine in cigarettes. Medicines may:  · Help to stop cravings.  · Help to relieve withdrawal symptoms.  Your health care provider may recommend:  · Nicotine patches, gum, or lozenges.  · Nicotine inhalers or sprays.  · Non-nicotine medicine that is taken by mouth.  Find resources  Find resources and support systems that can help you to quit smoking and remain smoke-free after you quit. These resources are most helpful when you use them often. They include:  · Online chats with a counselor.  · Telephone quitlines.  · Printed self-help materials.  · Support groups or group counseling.  · Text messaging programs.  · Mobile phone apps or applications. Use apps that can help you stick to your quit plan by providing reminders, tips, and encouragement. There are many free apps for mobile devices as well as websites. Examples include Quit Guide from the CDC and smokefree.gov  What things can I do to make it easier to quit?    · Reach out to your family and friends for support and encouragement. Call telephone quitlines (7-800-QUIT-NOW), reach  out to support groups, or work with a counselor for support.  · Ask people who smoke to avoid smoking around you.  · Avoid places that trigger you to smoke, such as bars, parties, or smoke-break areas at work.  · Spend time with people who do not smoke.  · Lessen the stress in your life. Stress can be a smoking trigger for some people. To lessen stress, try:  ? Exercising regularly.  ? Doing deep-breathing exercises.  ? Doing yoga.  ? Meditating.  ? Performing a body scan. This involves closing your eyes, scanning your body from head to toe, and noticing which parts of your body are particularly tense. Try to relax the muscles in those areas.  How will I feel when I quit smoking?  Day 1 to 3 weeks  Within the first 24 hours of quitting smoking, you may start to feel withdrawal symptoms. These symptoms are usually most noticeable 2-3 days after quitting, but they usually do not last for more than 2-3 weeks. You may experience these symptoms:  · Mood swings.  · Restlessness, anxiety, or irritability.  · Trouble concentrating.  · Dizziness.  · Strong cravings for sugary foods and nicotine.  · Mild weight gain.  · Constipation.  · Nausea.  · Coughing or a sore throat.  · Changes in how the medicines that you take for unrelated issues work in your body.  · Depression.  · Trouble sleeping (insomnia).  Week 3 and afterward  After the first 2-3 weeks of quitting, you may start to notice more positive results, such as:  · Improved sense of smell and taste.  · Decreased coughing and sore throat.  · Slower heart rate.  · Lower blood pressure.  · Clearer skin.  · The ability to breathe more easily.  · Fewer sick days.  Quitting smoking can be very challenging. Do not get discouraged if you are not successful the first time. Some people need to make many attempts to quit before they achieve long-term success. Do your best to stick to your quit plan, and talk with your health care provider if you have any questions or  concerns.  Summary  · Smoking tobacco is the leading cause of preventable death. Quitting smoking is one of the best things that you can do for your health.  · When you decide to quit smoking, create a plan to help you succeed.  · Quit smoking right away, not slowly over a period of time.  · When you start quitting, seek help from your health care provider, family, or friends.  This information is not intended to replace advice given to you by your health care provider. Make sure you discuss any questions you have with your health care provider.  Document Revised: 09/11/2020 Document Reviewed: 03/07/2020  [employer] Patient Education © 2020 [employer]      Tobacco Use Disorder  Tobacco use disorder (TUD) occurs when a person craves, seeks, and uses tobacco, regardless of the consequences. This disorder can cause problems with mental and physical health. It can affect your ability to have healthy relationships, and it can keep you from meeting your responsibilities at work, home, or school.  Tobacco may be:  · Smoked as a cigarette or cigar.  · Inhaled using e-cigarettes.  · Smoked in a pipe or hookah.  · Chewed as smokeless tobacco.  · Inhaled into the nostrils as snuff.  Tobacco products contain a dangerous chemical called nicotine, which is very addictive. Nicotine triggers hormones that make the body feel stimulated and works on areas of the brain that make you feel good. These effects can make it hard for people to quit nicotine.  Tobacco contains many other unsafe chemicals that can damage almost every organ in the body. Smoking tobacco also puts others in danger due to fire risk and possible health problems caused by breathing in secondhand smoke.  What are the signs or symptoms?  Symptoms of TUD may include:  · Being unable to slow down or stop your tobacco use.  · Spending an abnormal amount of time getting or using tobacco.  · Craving tobacco. Cravings may last for up to 6 months after quitting.  · Tobacco  use that:  ? Interferes with your work, school, or home life.  ? Interferes with your personal and social relationships.  ? Makes you give up activities that you once enjoyed or found important.  · Using tobacco even though you know that it is:  ? Dangerous or bad for your health or someone else's health.  ? Causing problems in your life.  · Needing more and more of the substance to get the same effect (developing tolerance).  · Experiencing unpleasant symptoms if you do not use the substance (withdrawal). Withdrawal symptoms may include:  ? Depressed, anxious, or irritable mood.  ? Difficulty concentrating.  ? Increased appetite.  ? Restlessness or trouble sleeping.  · Using the substance to avoid withdrawal.  How is this diagnosed?  This condition may be diagnosed based on:  · Your current and past tobacco use. Your health care provider may ask questions about how your tobacco use affects your life.  · A physical exam.  You may be diagnosed with TUD if you have at least two symptoms within a 12-month period.  How is this treated?  This condition is treated by stopping tobacco use. Many people are unable to quit on their own and need help. Treatment may include:  · Nicotine replacement therapy (NRT). NRT provides nicotine without the other harmful chemicals in tobacco. NRT gradually lowers the dosage of nicotine in the body and reduces withdrawal symptoms. NRT is available as:  ? Over-the-counter gums, lozenges, and skin patches.  ? Prescription mouth inhalers and nasal sprays.  · Medicine that acts on the brain to reduce cravings and withdrawal symptoms.  · A type of talk therapy that examines your triggers for tobacco use, how to avoid them, and how to cope with cravings (behavioral therapy).  · Hypnosis. This may help with withdrawal symptoms.  · Joining a support group for others coping with TUD.  The best treatment for TUD is usually a combination of medicine, talk therapy, and support groups. Recovery can be  a long process. Many people start using tobacco again after stopping (relapse). If you relapse, it does not mean that treatment will not work.  Follow these instructions at home:    Lifestyle  · Do not use any products that contain nicotine or tobacco, such as cigarettes and e-cigarettes.  · Avoid things that trigger tobacco use as much as you can. Triggers include people and situations that usually cause you to use tobacco.  · Avoid drinks that contain caffeine, including coffee. These may worsen some withdrawal symptoms.  · Find ways to manage stress. Wanting to smoke may cause stress, and stress can make you want to smoke. Relaxation techniques such as deep breathing, meditation, and yoga may help.  · Attend support groups as needed. These groups are an important part of long-term recovery for many people.  General instructions  · Take over-the-counter and prescription medicines only as told by your health care provider.  · Check with your health care provider before taking any new prescription or over-the-counter medicines.  · Decide on a friend, family member, or smoking quit-line (such as 1-800-QUIT-NOW in the [place] that you can call or text when you feel the urge to smoke or when you need help coping with cravings.  · Keep all follow-up visits as told by your health care provider and therapist. This is important.  Contact a health care provider if:  · You are not able to take your medicines as prescribed.  · Your symptoms get worse, even with treatment.  Summary  · Tobacco use disorder (TUD) occurs when a person craves, seeks, and uses tobacco regardless of the consequences.  · This condition may be diagnosed based on your current and past tobacco use and a physical exam.  · Many people are unable to quit on their own and need help. Recovery can be a long process.  · The most effective treatment for TUD is usually a combination of medicine, talk therapy, and support groups.  This information is not intended  to replace advice given to you by your health care provider. Make sure you discuss any questions you have with your health care provider.  Document Revised: 12/05/2018 Document Reviewed: 12/05/2018  [employer]vier Patient Education © 2020 [employer]

## 2021-01-19 ENCOUNTER — TREATMENT (OUTPATIENT)
Dept: PHYSICAL THERAPY | Facility: CLINIC | Age: 36
End: 2021-01-19

## 2021-01-19 DIAGNOSIS — S06.0X1A CONCUSSION WITH LOSS OF CONSCIOUSNESS OF 30 MINUTES OR LESS, INITIAL ENCOUNTER: Primary | ICD-10-CM

## 2021-01-19 DIAGNOSIS — S09.90XA HEAD TRAUMA, INITIAL ENCOUNTER: ICD-10-CM

## 2021-01-19 DIAGNOSIS — M54.2 CERVICALGIA: ICD-10-CM

## 2021-01-19 PROCEDURE — 97112 NEUROMUSCULAR REEDUCATION: CPT | Performed by: PHYSICAL THERAPIST

## 2021-01-19 PROCEDURE — 97140 MANUAL THERAPY 1/> REGIONS: CPT | Performed by: PHYSICAL THERAPIST

## 2021-01-19 NOTE — PROGRESS NOTES
Physical Therapy Treatment Note    Patient: London Calabrese                                                                                     Today's Date: 2021  :     1985    Referring practitioner:    Anthony Orona MD  Date of Initial Visit:          Type: THERAPY  Noted: 2020    Patient seen for 13 sessions    Visit Diagnoses:    ICD-10-CM ICD-9-CM   1. Concussion with loss of consciousness of 30 minutes or less, initial encounter  S06.0X1A 850.11   2. Cervicalgia  M54.2 723.1   3. Head trauma, initial encounter  S09.90XA 959.01     SUBJECTIVE     Subjective He reports the HA's are still there he had a pretty good migraine Thursday and took his medication and slept till Friday. The dizziness still occurs when he bends over. He feels groggy all day every day for the last week.     PAIN:  Pre: 6/10 post 5/10         OBJECTIVE     Objective      Neuromuscular Reeducation     24989 Comments   Visual scanning and memory sequencing numbers, letters, and images up to 5 @ BITS Difficulty sequencing beyond 3, slightly better with images   Visual scanning sequencing rotator disc numbers, letters, and multicolors in NS @ BITS Had increased dizziness                Timed Minutes 30     Manual Therapy     88033  Comments   Cervical suboccipital releases, manual traction and traction with B lateral bends Grade 2 sustained In HL   STM B cervical paraspinals, UT, and LS Seated propped on elevated treatment table               Timed Minutes 30          Therapy Education/Self Care 65395   Details:    Medbridge Code:    Given postural retraining and symptom relief   Progress: Reinforced   Who provided to: Patient   Level of understanding Verbalized   Timed Minutes      Total Timed Treatment:     60   mins  Total Time of Visit:            60   mins         ASSESSMENT/PLAN     Goals                                          Progress Note due by 02/3/21   STG by: 21 Comments Status   Pt will demonstrate 50  degrees or greater (B) cervical rotaiton  Onging                  LTG by: 2/3/21     Pt will demonstrate 65 degrees or greater (B) cervical rotaiton  Ongoing   Pt will score 10 or less on the NDI  Ongoing   Pt will be indpendent with HEP to include postural strengthening and vestibular exercises  Ongoing   Pt will report no dizziness with bending over for one week Had some dizziness with sequencing and 3 LL LOB's with BITS activities today requiring CGA/minAx1 Ongoing   Pt will demonstrate SLS for 30 seconds or greater   Ongoing          Assessment/Plan     ASSESSMENT: His reaction time with sequencing numbers and letters did increase with user paced with a low rotating speed but the dizziness and oscillations out of midline increased significantly as well. He has had a thirteen day lapse in his POC while we were awaiting continued insurance authorizations but on the positive side he has not had a signifcant increase of his symptoms during this lapse. However, his symptoms are persistent and we continue to manage them while still progressing his POC simultaneously which has been challenging.    PLAN: Review the method and frequency that he is performing VOR cancelation one and two as well as his seated eye movement and head turns with B UE unweighted. Also continue to progress static balance challenges with EO/EC and activities at the [employer] machine.    Signature: Eusebio Gonzalez, PTA

## 2021-01-21 ENCOUNTER — TREATMENT (OUTPATIENT)
Dept: PHYSICAL THERAPY | Facility: CLINIC | Age: 36
End: 2021-01-21

## 2021-01-21 DIAGNOSIS — S06.0X1A CONCUSSION WITH LOSS OF CONSCIOUSNESS OF 30 MINUTES OR LESS, INITIAL ENCOUNTER: Primary | ICD-10-CM

## 2021-01-21 DIAGNOSIS — S09.90XA HEAD TRAUMA, INITIAL ENCOUNTER: ICD-10-CM

## 2021-01-21 DIAGNOSIS — M54.2 CERVICALGIA: ICD-10-CM

## 2021-01-21 PROCEDURE — 97140 MANUAL THERAPY 1/> REGIONS: CPT | Performed by: PHYSICAL THERAPIST

## 2021-01-21 PROCEDURE — 97112 NEUROMUSCULAR REEDUCATION: CPT | Performed by: PHYSICAL THERAPIST

## 2021-01-21 NOTE — PROGRESS NOTES
Physical Therapy Treatment Note    Patient: London Calabrese                                                                                     Today's Date: 2021  :     1985    Referring practitioner:    Anthony Orona MD  Date of Initial Visit:          Type: THERAPY  Noted: 2020    Patient seen for 14 sessions    Visit Diagnoses:    ICD-10-CM ICD-9-CM   1. Concussion with loss of consciousness of 30 minutes or less, initial encounter  S06.0X1A 850.11   2. Cervicalgia  M54.2 723.1   3. Head trauma, initial encounter  S09.90XA 959.01     SUBJECTIVE     Subjective Pt reports that he didn't sleep well at all last night due to increased headache. He reports that he has been pretty groggy over the past week or so.     PAIN:  Pre: 5.510 post 5/10         OBJECTIVE     Objective      Neuromuscular Reeducation     54952 Comments   Ambulation in hallway scanning for numbers reaching and grabbing numbers in numerical order. Increased dizziness and nausea    Ambulation with head turns and nods Path deviations with both. Large deviations initially with head turns, after cueing to engage the core and hips path variance decreased. During nods pt described feeling the motion throughout his body almost as if being on a boat.   Seated in dark room head fixated forward eye movements following flashlight on wall  This reproduced dizziness and he did have slight blurred vision following.   Standing in dark room head fixated forward eyes following flashlight Instability noted with increased sway with utilization of hip and ankle strategy for balance.       Timed Minutes 30     Manual Therapy     69447  Comments   Cervical suboccipital releases, manual traction and traction with B lateral bends Grade 2 sustained supine   STM B cervical paraspinals, UT, and LS supine   Cervical side glides Grade 2-3   Thoracic PAs Grade 2-3       Timed Minutes 30          Therapy Education/Self Care 68582   Details: Symptoms, stimulus  reproduction and logging or irritating factors at home prior to next visit    Medbridge Code:    Given postural retraining and symptom relief   Progress: Reinforced   Who provided to: Patient   Level of understanding Verbalized   Timed Minutes      Total Timed Treatment:     60   mins  Total Time of Visit:            60   mins         ASSESSMENT/PLAN     Goals                                          Progress Note due by 02/3/21   STG by: 1/31/21 Comments Status   Pt will demonstrate 50 degrees or greater (B) cervical rotaiton  Onging                  LTG by: 2/3/21     Pt will demonstrate 65 degrees or greater (B) cervical rotaiton Stiff and limited with cervical side glides Ongoing   Pt will score 10 or less on the NDI  Ongoing   Pt will be indpendent with HEP to include postural strengthening and vestibular exercises  Ongoing   Pt will report no dizziness with bending over for one week  Ongoing   Pt will demonstrate SLS for 30 seconds or greater   Ongoing          Assessment/Plan     ASSESSMENT: Initially he was very guarded in his upper traps and suboccipital region. There was hypomobility in the upper cervical segments and tenderness during side glides. He also demonstrated hypomobility and tenderness along the thoracic spine, however did better in tolerating prone positioning. With ambulation and head turns he demonstrated large path deviations and instability. This also increased his nausea and dizziness. Cues were provided for core and hip activation and this decreased the amount of deviation. We also challenged his balance in a dark room having him follow a moving flashlight on the wall, in standing he demonstrated increased sway requiring him to use hip and ankle strategy for balance. He recognized that these tasks shouldn't be as challenging as they are and reports that some times this is frustrating. We discussed the mechanisms of a concussion and the recovery process. He felt better after leaving and I  encouraged him to make a log of things at home that reproduces symptoms and bring that in next week for us to address.    PLAN: Continue to work on his cervical restrictions and spinal mobility. Work on his balance and incorporate activities that involve head/eye movements.    Signature: Juanjose Turcios, PT DPT

## 2021-01-26 ENCOUNTER — TREATMENT (OUTPATIENT)
Dept: PHYSICAL THERAPY | Facility: CLINIC | Age: 36
End: 2021-01-26

## 2021-01-26 DIAGNOSIS — M54.2 CERVICALGIA: ICD-10-CM

## 2021-01-26 DIAGNOSIS — S06.0X1A CONCUSSION WITH LOSS OF CONSCIOUSNESS OF 30 MINUTES OR LESS, INITIAL ENCOUNTER: Primary | ICD-10-CM

## 2021-01-26 DIAGNOSIS — S09.90XA HEAD TRAUMA, INITIAL ENCOUNTER: ICD-10-CM

## 2021-01-26 PROCEDURE — 97110 THERAPEUTIC EXERCISES: CPT | Performed by: PHYSICAL THERAPIST

## 2021-01-26 PROCEDURE — 97140 MANUAL THERAPY 1/> REGIONS: CPT | Performed by: PHYSICAL THERAPIST
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
## 2021-01-28 ENCOUNTER — TREATMENT (OUTPATIENT)
Dept: PHYSICAL THERAPY | Facility: CLINIC | Age: 36
End: 2021-01-28

## 2021-01-28 DIAGNOSIS — S09.90XA HEAD TRAUMA, INITIAL ENCOUNTER: ICD-10-CM

## 2021-01-28 DIAGNOSIS — M54.2 CERVICALGIA: ICD-10-CM

## 2021-01-28 DIAGNOSIS — S06.0X1A CONCUSSION WITH LOSS OF CONSCIOUSNESS OF 30 MINUTES OR LESS, INITIAL ENCOUNTER: Primary | ICD-10-CM

## 2021-01-28 PROCEDURE — 97140 MANUAL THERAPY 1/> REGIONS: CPT | Performed by: PHYSICAL THERAPIST

## 2021-01-28 PROCEDURE — 97112 NEUROMUSCULAR REEDUCATION: CPT | Performed by: PHYSICAL THERAPIST

## 2021-01-28 NOTE — PROGRESS NOTES
"Physical Therapy Treatment Note    Patient: London Calabrese                                                                                     Today's Date: 2021  :     1985    Referring practitioner:    Anthony Orona MD  Date of Initial Visit:          Type: THERAPY  Noted: 2020    Patient seen for 16 sessions    Visit Diagnoses:    ICD-10-CM ICD-9-CM   1. Concussion with loss of consciousness of 30 minutes or less, initial encounter  S06.0X1A 850.11   2. Cervicalgia  M54.2 723.1   3. Head trauma, initial encounter  S09.90XA 959.01     SUBJECTIVE     Subjective He reports today he has pain over his R eye wrapping back to his neck, denies dizziness, reports a little \"cloudy\" with thought process.    PAIN: pre: 5.5/10         OBJECTIVE     Objective      Neuromuscular Reeducation     17229 Comments   Progressive toss and catch changing from orange ball<>tennis ball<>hand ball RS to NS to NS on blue foam     Hand offs/take aways standing on wobble board with orange ball     Simultaneous toss and catch RS with CGA x1 with gren and orange ball             Timed Minutes 30     Manual Therapy     51171  Comments   Cervical suboccipital releases, manual traction and traction with B lateral bends Grade 2 sustained supine                   Timed Minutes 15          Therapy Education/Self Care 62779   Details:    Medbridge Code:    Given symptom relief   Progress: New   Who provided to: Patient   Level of understanding Verbalized   Timed Minutes      Total Timed Treatment:     45   mins  Total Time of Visit:            45   mins         ASSESSMENT/PLAN     GOALS  Goals                                          Progress Note due by 02/3/21   STG by: 21 Comments Status   Pt will demonstrate 50 degrees or greater (B) cervical rotaiton   Onging                           LTG by: 2/3/21       Pt will demonstrate 65 degrees or greater (B) cervical rotaiton  Ongoing   Pt will score 10 or less on the NDI   " Ongoing   Pt will be indpendent with HEP to include postural strengthening and vestibular exercises   Ongoing   Pt will report no dizziness with bending over for one week  no dizziness reported today; however, I did avoid having him bend over Ongoing   Pt will demonstrate SLS for 30 seconds or greater    Ongoing         Assessment/Plan     ASSESSMENT:  Over time he became more proficient with visual tracking and we were able to progress to dual visual tracking with simultaneous toss and catch.    PLAN: Continue to progress as symptoms allow.    Signature: Eusebio Gonzalez, PTA

## 2021-02-02 ENCOUNTER — TREATMENT (OUTPATIENT)
Dept: PHYSICAL THERAPY | Facility: CLINIC | Age: 36
End: 2021-02-02

## 2021-02-02 DIAGNOSIS — S06.0X1A CONCUSSION WITH LOSS OF CONSCIOUSNESS OF 30 MINUTES OR LESS, INITIAL ENCOUNTER: Primary | ICD-10-CM

## 2021-02-02 DIAGNOSIS — S09.90XA HEAD TRAUMA, INITIAL ENCOUNTER: ICD-10-CM

## 2021-02-02 DIAGNOSIS — M54.2 CERVICALGIA: ICD-10-CM

## 2021-02-02 PROCEDURE — 97110 THERAPEUTIC EXERCISES: CPT | Performed by: PHYSICAL THERAPIST

## 2021-02-02 PROCEDURE — 97140 MANUAL THERAPY 1/> REGIONS: CPT | Performed by: PHYSICAL THERAPIST

## 2021-02-02 PROCEDURE — 97112 NEUROMUSCULAR REEDUCATION: CPT | Performed by: PHYSICAL THERAPIST

## 2021-02-02 NOTE — PROGRESS NOTES
Physical Therapy Treatment Note    Patient: London Calabrese                                                                                     Today's Date: 2021  :     1985    Referring practitioner:    Anthony Orona MD  Date of Initial Visit:          Type: THERAPY  Noted: 2020    Patient seen for 17 sessions    Visit Diagnoses:    ICD-10-CM ICD-9-CM   1. Concussion with loss of consciousness of 30 minutes or less, initial encounter  S06.0X1A 850.11   2. Cervicalgia  M54.2 723.1   3. Head trauma, initial encounter  S09.90XA 959.01     SUBJECTIVE     Subjective He reports that he started to get a migraine this morning. He went to the grocery store the other day and had to leave due to over stimulation.    PAIN: pre: 5.510         OBJECTIVE     Objective      Neuromuscular Reeducation     15878 Comments   Toss and catch with orange ball in // bars    Reaching outside [place] with emphasis on L weight shifting    Simultaneous toss and catch RS with CGA x1 with green and orange ball     Gait with head turns and nods Path deviations bilaterally with one LOB that required therapist assist       Timed Minutes 30     Manual Therapy     39725  Comments   Cervical suboccipital releases, manual traction and traction with B lateral bends Grade 2 sustained supine   B UT, LS suboccipitals STM                 Timed Minutes 20        Therapeutic Exercises    42626 Comments   unicam seat lvl 7 resistance 2                    Timed Minutes 8       Therapy Education/Self Care 12498   Details:    Medbridge Code:    Given symptom relief   Progress: Reinforced   Who provided to: Patient   Level of understanding Verbalized   Timed Minutes      Total Timed Treatment:     58   mins  Total Time of Visit:            58   mins         ASSESSMENT/PLAN     GOALS  Goals                                          Progress Note due by 02/3/21   STG by: 21 Comments Status   Pt will demonstrate 50 degrees or greater (B) cervical  rotaiton   Onging                           LTG by: 2/3/21       Pt will demonstrate 65 degrees or greater (B) cervical rotaiton  Ongoing   Pt will score 10 or less on the NDI   Ongoing   Pt will be indpendent with HEP to include postural strengthening and vestibular exercises   Ongoing   Pt will report no dizziness with bending over for one week  mild dizziness today with activity Ongoing   Pt will demonstrate SLS for 30 seconds or greater    Ongoing         Assessment/Plan     ASSESSMENT:  London did much better today with coordination activities and weight shifting to the L. He caught more of the passes to the left than his previous sessions. His biggest issue still seems to be visual tracking but with practice and cues this improves.    PLAN: Continue to progress with more challenging balance and proprioceptive activities.    Signature: Juanjose Turcios, PT DPT

## 2021-02-04 ENCOUNTER — TREATMENT (OUTPATIENT)
Dept: PHYSICAL THERAPY | Facility: CLINIC | Age: 36
End: 2021-02-04

## 2021-02-04 DIAGNOSIS — S06.0X1A CONCUSSION WITH LOSS OF CONSCIOUSNESS OF 30 MINUTES OR LESS, INITIAL ENCOUNTER: Primary | ICD-10-CM

## 2021-02-04 DIAGNOSIS — S09.90XA HEAD TRAUMA, INITIAL ENCOUNTER: ICD-10-CM

## 2021-02-04 DIAGNOSIS — M54.2 CERVICALGIA: ICD-10-CM

## 2021-02-04 PROCEDURE — 97112 NEUROMUSCULAR REEDUCATION: CPT | Performed by: PHYSICAL THERAPIST

## 2021-02-04 PROCEDURE — 97530 THERAPEUTIC ACTIVITIES: CPT | Performed by: PHYSICAL THERAPIST

## 2021-02-04 PROCEDURE — 97110 THERAPEUTIC EXERCISES: CPT | Performed by: PHYSICAL THERAPIST

## 2021-02-04 NOTE — PROGRESS NOTES
Progress Note Addendum      Patient: London Calabrese           : 1985  Today's Date: 2021  Referring practitioner: Anthony Orona MD  Date of Initial Visit: Type: THERAPY  Noted: 2020  Patient seen for 18 sessions  Visit Diagnoses:    ICD-10-CM ICD-9-CM   1. Concussion with loss of consciousness of 30 minutes or less, initial encounter  S06.0X1A 850.11   2. Cervicalgia  M54.2 723.1   3. Head trauma, initial encounter  S09.90XA 959.01       PT G-Codes  Outcome Measure Options: Neck Disability Index (NDI)  Neck Disability Index Score: 33  Clinical Progress: improved  Home Program Compliance: Yes  Treatment has included: therapeutic exercise, neuromuscular re-education, manual therapy, therapeutic activity and gait training  Progress toward previous goals: Partially Met  Prognosis to achieve goals: good    Subjective   Objective   Assessment & Plan     Assessment  Impairments: abnormal coordination, abnormal gait, activity intolerance and impaired balance  Prognosis: good  Functional Limitations: sleeping, walking, standing and stooping  Plan  Planned therapy interventions: manual therapy, balance/weight-bearing training, body mechanics training, neuromuscular re-education, motor coordination training, postural training, fine motor coordination training, soft tissue mobilization, spinal/joint mobilization, strengthening, functional ROM exercises, flexibility, gait training, stretching, therapeutic activities, home exercise program, IADL retraining and joint mobilization  Frequency: 2x week  Duration in visits: 8  Duration in weeks: 4  Treatment plan discussed with: patient        I have reviewed the progress note information provided by Eusebio Gonzalez PTA, and I concur with the findings.    Juanjose Turcios, PT DPT  Physical Therapist
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
## 2021-02-09 ENCOUNTER — TREATMENT (OUTPATIENT)
Dept: PHYSICAL THERAPY | Facility: CLINIC | Age: 36
End: 2021-02-09

## 2021-02-09 DIAGNOSIS — M54.2 CERVICALGIA: ICD-10-CM

## 2021-02-09 DIAGNOSIS — S09.90XA HEAD TRAUMA, INITIAL ENCOUNTER: ICD-10-CM

## 2021-02-09 DIAGNOSIS — S06.0X1A CONCUSSION WITH LOSS OF CONSCIOUSNESS OF 30 MINUTES OR LESS, INITIAL ENCOUNTER: Primary | ICD-10-CM

## 2021-02-09 PROCEDURE — 97140 MANUAL THERAPY 1/> REGIONS: CPT | Performed by: PHYSICAL THERAPIST

## 2021-02-09 PROCEDURE — 97112 NEUROMUSCULAR REEDUCATION: CPT | Performed by: PHYSICAL THERAPIST

## 2021-02-09 NOTE — PROGRESS NOTES
Physical Therapy Treatment Note    Patient: London Calabrese                                                                                     Today's Date: 2021  :     1985    Referring practitioner:    Anthony Orona MD  Date of Initial Visit:          Type: THERAPY  Noted: 2020    Patient seen for 19 sessions    Visit Diagnoses:    ICD-10-CM ICD-9-CM   1. Concussion with loss of consciousness of 30 minutes or less, initial encounter  S06.0X1A 850.11   2. Cervicalgia  M54.2 723.1   3. Head trauma, initial encounter  S09.90XA 959.01     SUBJECTIVE     Subjective Pt reports mild headache, dizziness and nauseas.    PAIN: pre: 5/10         OBJECTIVE     Objective      Neuromuscular Reeducation     [id number] Comments   Weight shifting on the BOSU A/P, M/L    Standing on BOSU head turns and eye movement    Backwards walking x2    Diagonal cone walking with quick cut/change of direction    Weaving in and out of cones    Figure 8 around cones    Timed Minutes 25     Manual Therapy     [id number]  Comments   Cervical suboccipital releases, manual traction and traction with B lateral bends Grade 2 sustained supine   B UT, LS suboccipitals STM  Trigger point in L LS   Side glides cervical     Self cervical SNAGS with towel        Timed Minutes 20          Therapy Education/Self Care 64751   Details:    Medbridge Code:    Given symptom relief   Progress: Reinforced   Who provided to: Patient   Level of understanding Verbalized   Timed Minutes      Total Timed Treatment:     45   mins  Total Time of Visit:            45   mins         ASSESSMENT/PLAN     GOALS  Goals                                          Progress Note due by 02/3/21   STG by: 21 Comments Status   Pt will demonstrate 50 degrees or greater (B) cervical rotaiton   Onging                           LTG by: 2/3/21       Pt will demonstrate 65 degrees or greater (B) cervical rotaiton Increased tenderness in L LS Ongoing   Pt will score 10 or less on  the NDI   Ongoing   Pt will be indpendent with HEP to include postural strengthening and vestibular exercises   Ongoing   Pt will report no dizziness with bending over for one week   Ongoing   Pt will demonstrate SLS for 30 seconds or greater    Ongoing         Assessment/Plan     ASSESSMENT:  London was a little more flared today with increased tenderness in his L upper trap and a trigger point present in the L LS. Her was more guarded upon palpation and with side glides as well. During balance activities he demonstrated a bit more instability and a reproduction of dizziness. He had the most difficulty with backwards walking requiring support to prevent multiple LOB.    PLAN: Continue to work on his balance and mobility. Consider BITS again next session with visual tracking.    Signature: Juanjose Turicos, PT DPT

## 2021-02-16 ENCOUNTER — TREATMENT (OUTPATIENT)
Dept: PHYSICAL THERAPY | Facility: CLINIC | Age: 36
End: 2021-02-16

## 2021-02-16 DIAGNOSIS — M54.2 CERVICALGIA: ICD-10-CM

## 2021-02-16 DIAGNOSIS — S06.0X1A CONCUSSION WITH LOSS OF CONSCIOUSNESS OF 30 MINUTES OR LESS, INITIAL ENCOUNTER: Primary | ICD-10-CM

## 2021-02-16 DIAGNOSIS — S09.90XA HEAD TRAUMA, INITIAL ENCOUNTER: ICD-10-CM

## 2021-02-16 PROCEDURE — 97140 MANUAL THERAPY 1/> REGIONS: CPT | Performed by: PHYSICAL THERAPIST

## 2021-02-16 PROCEDURE — 97112 NEUROMUSCULAR REEDUCATION: CPT | Performed by: PHYSICAL THERAPIST

## 2021-02-16 NOTE — PROGRESS NOTES
Physical Therapy Treatment Note    Patient: London Calabrese                                                                                     Today's Date: 2021  :     1985    Referring practitioner:    Anthony Orona MD  Date of Initial Visit:          Type: THERAPY  Noted: 2020    Patient seen for 20 sessions    Visit Diagnoses:    ICD-10-CM ICD-9-CM   1. Concussion with loss of consciousness of 30 minutes or less, initial encounter  S06.0X1A 850.11   2. Cervicalgia  M54.2 723.1   3. Head trauma, initial encounter  S09.90XA 959.01     SUBJECTIVE     Subjective Pt reports that he has an increased headache today, the brightness of the snow and sun has irritated his symptoms this morning.    PAIN: pre: 6/10         OBJECTIVE     Objective      Neuromuscular Reeducation     72763 Comments   Stepping in and out of agility ladder with ball catches and throws, various directions and patterns utilized to challenge is visual tracking, reflexes and coordination. 3-4 instances that required therapist assist to prevent LOB.    One therapist throwing ball while other provided guarding with gait belt.                       Timed Minutes 30     Manual Therapy     [id number]  Comments   Cervical suboccipital releases, manual traction and traction with B lateral bends Grade 2 sustained supine   B UT, LS suboccipitals STM  Trigger point in L LS   Side glides cervical             Timed Minutes 15          Therapy Education/Self Care 27494   Details:    Medbridge Code:    Given symptom relief   Progress: Reinforced   Who provided to: Patient   Level of understanding Verbalized   Timed Minutes      Total Timed Treatment:     45   mins  Total Time of Visit:            45   mins         ASSESSMENT/PLAN     GOALS  Goals                                          Progress Note due by 02/3/21   STG by: 21 Comments Status   Pt will demonstrate 50 degrees or greater (B) cervical rotaiton   Onging                            LTG by: 2/3/21       Pt will demonstrate 65 degrees or greater (B) cervical rotaiton  Ongoing   Pt will score 10 or less on the NDI   Ongoing   Pt will be indpendent with HEP to include postural strengthening and vestibular exercises   Ongoing   Pt will report no dizziness with bending over for one week  LOB with activity this date Ongoing   Pt will demonstrate SLS for 30 seconds or greater    Ongoing         Assessment/Plan     ASSESSMENT:  We did more advance activity with London today and he tolerated it well and improving with more reps. We utilized the agility ladder making him move in and out and side to side while catching a ball thrown to him from various distances and different heights and angles. There was a therapist guarding during the entire time for support. Initially he had difficulty catching the ball especially when thrown from further distances and to the left when moving left, however this got better with each rep. There were 3-4 occasions that required therapist assist to prevent a fall. Post session he felt much better and demonstrated increased confidence in his abilities.    PLAN: Continue to challenge him with more dynamic gait and balance activities.    Signature: Juanjose Turcios, PT DPT

## 2021-02-18 ENCOUNTER — TREATMENT (OUTPATIENT)
Dept: PHYSICAL THERAPY | Facility: CLINIC | Age: 36
End: 2021-02-18

## 2021-02-18 DIAGNOSIS — M54.2 CERVICALGIA: ICD-10-CM

## 2021-02-18 DIAGNOSIS — S06.0X1A CONCUSSION WITH LOSS OF CONSCIOUSNESS OF 30 MINUTES OR LESS, INITIAL ENCOUNTER: Primary | ICD-10-CM

## 2021-02-18 DIAGNOSIS — S09.90XA HEAD TRAUMA, INITIAL ENCOUNTER: ICD-10-CM

## 2021-02-18 PROCEDURE — 97110 THERAPEUTIC EXERCISES: CPT | Performed by: PHYSICAL THERAPIST

## 2021-02-18 PROCEDURE — 97112 NEUROMUSCULAR REEDUCATION: CPT | Performed by: PHYSICAL THERAPIST

## 2021-02-18 NOTE — PROGRESS NOTES
Physical Therapy Treatment Note    Patient: London Calabrese                                                                                     Today's Date: 2021  :     1985    Referring practitioner:    Anthony Orona MD  Date of Initial Visit:          Type: THERAPY  Noted: 2020    Patient seen for 21 sessions    Visit Diagnoses:    ICD-10-CM ICD-9-CM   1. Concussion with loss of consciousness of 30 minutes or less, initial encounter  S06.0X1A 850.11   2. Cervicalgia  M54.2 723.1   3. Head trauma, initial encounter  S09.90XA 959.01     SUBJECTIVE     Subjective He has a mild headache this morning. Reported that following previous session, he had to take a nap due to headache. However, woke up following nap feeling good.     PAIN: pre: 5/10         OBJECTIVE     Objective      Therapeutic Exercises    39289 Comments   unicam seat lvl 6                     Timed Minutes 10     Neuromuscular Reeducation     52541 Comments   3 cone ball toss, side to side, anterior and posterior 2 x 5 each direction    Ambulation forward with therapist moving side to side throwing ball from right to left and left to right x3    Dribbling 45 cm stability ball with each hand and side to side  x2 each   Tandem stance ball catch and throw 2 x 10 each leg posterior           Timed Minutes 35            Therapy Education/Self Care 08627   Details:    Medbridge Code:    Given symptom relief   Progress: Reinforced   Who provided to: Patient   Level of understanding Verbalized   Timed Minutes      Total Timed Treatment:     45   mins  Total Time of Visit:            45   mins         ASSESSMENT/PLAN     GOALS  Goals                                          Progress Note due by 03/3/21   STG by: 21 Comments Status   Pt will demonstrate 50 degrees or greater (B) cervical rotaiton   Onging                           LTG by: 2/3/21       Pt will demonstrate 65 degrees or greater (B) cervical rotaiton  Ongoing   Pt will score  10 or less on the NDI   Ongoing   Pt will be indpendent with HEP to include postural strengthening and vestibular exercises   Ongoing   Pt will report no dizziness with bending over for one week   Ongoing   Pt will demonstrate SLS for 30 seconds or greater   difficulty with tandem stance Ongoing         Assessment/Plan     ASSESSMENT: We continued to challenge London with balance and multitasking activities this date. His performance was better today compared to his other session this week with decreased loss of balance when reaching outside his MANSOOR. He continues to have the most difficulty reaching to the left or weight shifting to the left. Overall he did very well with multi-tasking today. One of his main goals is to be able to play in the floor with his young child without an increase in dizziness, so plan to incorporate this in his next couple of sessions.    PLAN: Continue to challenge him with more dynamic gait and balance activities. Consider trying activities on the ground with head movements as this flares his symptoms when playing with his small child.    Signature: Juanjose Turcios, PT DPT

## 2021-02-23 ENCOUNTER — TREATMENT (OUTPATIENT)
Dept: PHYSICAL THERAPY | Facility: CLINIC | Age: 36
End: 2021-02-23

## 2021-02-23 DIAGNOSIS — S06.0X1A CONCUSSION WITH LOSS OF CONSCIOUSNESS OF 30 MINUTES OR LESS, INITIAL ENCOUNTER: Primary | ICD-10-CM

## 2021-02-23 DIAGNOSIS — S09.90XA HEAD TRAUMA, INITIAL ENCOUNTER: ICD-10-CM

## 2021-02-23 DIAGNOSIS — M54.2 CERVICALGIA: ICD-10-CM

## 2021-02-23 PROCEDURE — 97112 NEUROMUSCULAR REEDUCATION: CPT | Performed by: PHYSICAL THERAPIST

## 2021-02-23 PROCEDURE — 97110 THERAPEUTIC EXERCISES: CPT | Performed by: PHYSICAL THERAPIST

## 2021-02-23 NOTE — PROGRESS NOTES
Physical Therapy Treatment Note    Patient: London Calabrese                                                                                     Today's Date: 2021  :     1985    Referring practitioner:    Anthony Orona MD  Date of Initial Visit:          Type: THERAPY  Noted: 2020    Patient seen for 22 sessions    Visit Diagnoses:    ICD-10-CM ICD-9-CM   1. Concussion with loss of consciousness of 30 minutes or less, initial encounter  S06.0X1A 850.11   2. Cervicalgia  M54.2 723.1   3. Head trauma, initial encounter  S09.90XA 959.01     SUBJECTIVE     Subjective Patient had a very bad headache that started at 7 pm last night and did not decrease in pain until 4 am this morning. Patient is not sure what triggered the headache. He said the pain has calmed down now, still uncomfortable though. Reported that overall, headaches have not been as intense since he has started physical therapy. No complaints following previous session.     PAIN: 5.5/10         OBJECTIVE     Objective      Therapeutic Exercises    00113 Comments   unicam seat lvl 6         Timed Minutes 10     Neuromuscular Reeducation     72917 Comments   Tandem stance ball catch and throw 1 x 10 each leg posterior   Tall kneeling on mat ball toss  X 15   Transition quadruped to tall kneeling X 10    Transition quadruped, alt UE lift, then transition to tall kneeling  X 2 (1 LOB)     Transition quadruped, alt LE lift, then transition to tall kneeling  x3   Timed Minutes 35            Therapy Education/Self Care 73641   Details: Educated patient on importance of changing head position at home (quadruped transitions, lying supine)   Medbridge Code:    Given symptom relief   Progress: Reinforced   Who provided to: Patient   Level of understanding Verbalized   Timed Minutes      Total Timed Treatment:     45   mins  Total Time of Visit:            45   mins         ASSESSMENT/PLAN     GOALS  Goals                                           Progress Note due by 03/3/21   STG by: 1/31/21 Comments Status   Pt will demonstrate 50 degrees or greater (B) cervical rotaiton   Onging                           LTG by: 2/3/21       Pt will demonstrate 65 degrees or greater (B) cervical rotaiton  Ongoing   Pt will score 10 or less on the NDI   Ongoing   Pt will be indpendent with HEP to include postural strengthening and vestibular exercises   Ongoing   Pt will report no dizziness with bending over for one week  Included exercises today that involved bending over, patient had increased dizziness  Ongoing   Pt will demonstrate SLS for 30 seconds or greater    Ongoing         Assessment/Plan     ASSESSMENT: Patient improving tolerance to upright cycling, able to look down to check time and maintain conversation with no increase in symptoms. Activities during this session focused on floor transfers and transition from quadruped to tall kneeling. Patient had intense dizziness and feeling of pressure in head when transitioning to quadruped, symptoms lasted between 30-45 seconds once returning to tall kneeling. Patient had one LOB when performing quadruped position w alternating UE due to increased dizziness. Patient was educated on the importance of working on these transitions at home to continue improving tolerance to activity. Patient will benefit from continued activity that changes his head position to help him have decreased symptoms when playing this kids and so that he can return to work.     PLAN: Focus on floor transfers and activities in the quadruped position. Continue to challenge dynamic standing balance.     Signature: Lacy Weldon, SHIVAM Student    Session was supervised and documentation was reviewed by Juanjose Turcios PT, DPT, ATC.    Signature: Juanjose Turcios PT, DPT, ATC

## 2021-02-24 ENCOUNTER — TELEPHONE (OUTPATIENT)
Dept: NEUROSURGERY | Facility: CLINIC | Age: 36
End: 2021-02-24

## 2021-02-25 ENCOUNTER — TREATMENT (OUTPATIENT)
Dept: PHYSICAL THERAPY | Facility: CLINIC | Age: 36
End: 2021-02-25

## 2021-02-25 ENCOUNTER — OFFICE VISIT (OUTPATIENT)
Dept: NEUROSURGERY | Facility: CLINIC | Age: 36
End: 2021-02-25

## 2021-02-25 VITALS
DIASTOLIC BLOOD PRESSURE: 80 MMHG | SYSTOLIC BLOOD PRESSURE: 122 MMHG | HEIGHT: 69 IN | WEIGHT: 177.4 LBS | BODY MASS INDEX: 26.28 KG/M2

## 2021-02-25 DIAGNOSIS — S06.0X1D CONCUSSION WITH LOSS OF CONSCIOUSNESS OF 30 MINUTES OR LESS, SUBSEQUENT ENCOUNTER: Primary | ICD-10-CM

## 2021-02-25 DIAGNOSIS — M54.2 CERVICALGIA: ICD-10-CM

## 2021-02-25 DIAGNOSIS — S06.0X1A CONCUSSION WITH LOSS OF CONSCIOUSNESS OF 30 MINUTES OR LESS, INITIAL ENCOUNTER: Primary | ICD-10-CM

## 2021-02-25 DIAGNOSIS — F17.210 CIGARETTE SMOKER: ICD-10-CM

## 2021-02-25 DIAGNOSIS — S09.90XA HEAD TRAUMA, INITIAL ENCOUNTER: ICD-10-CM

## 2021-02-25 PROCEDURE — 99213 OFFICE O/P EST LOW 20 MIN: CPT | Performed by: NURSE PRACTITIONER

## 2021-02-25 PROCEDURE — 97110 THERAPEUTIC EXERCISES: CPT | Performed by: PHYSICAL THERAPIST

## 2021-02-25 PROCEDURE — 97140 MANUAL THERAPY 1/> REGIONS: CPT | Performed by: PHYSICAL THERAPIST

## 2021-02-25 PROCEDURE — 97112 NEUROMUSCULAR REEDUCATION: CPT | Performed by: PHYSICAL THERAPIST

## 2021-02-25 NOTE — PROGRESS NOTES
Physical Therapy Treatment Note    Patient: London Calabrese                                                                                     Today's Date: 2021  :     1985    Referring practitioner:    Anthony Orona MD  Date of Initial Visit:          Type: THERAPY  Noted: 2020    Patient seen for 23 sessions    Visit Diagnoses:    ICD-10-CM ICD-9-CM   1. Concussion with loss of consciousness of 30 minutes or less, initial encounter  S06.0X1A 850.11   2. Cervicalgia  M54.2 723.1   3. Head trauma, initial encounter  S09.90XA 959.01     SUBJECTIVE     Subjective Patient had a sleepless night due to a painful headache. He said that it started at 7 pm and would last two hours, then he would lay down and it would increase in pain again. He described it as a throbbing in his head and the only way to get relief would be to sit upright. He stated that following last session, he had a headache that lasted approx 45 minutes and then he was exhausted the rest of the day.     PAIN: 5.5/10         OBJECTIVE     Objective      Therapeutic Exercises    85963 Comments   unicam seat lvl 6         Timed Minutes 10     Neuromuscular Reeducation     97760 Comments   Transition quadruped to tall kneeling X 10, hold quadruped 5 seconds    Transition quadruped, alt LE lift, then transition to tall kneeling  X 5    Timed Minutes 30     Manual Therapy     17745  Comments   Cervical neck distraction with lateral side bend     Suboccipital release     Upper trap STM and manual str.     Levator scap STM         Timed Minutes 15          Therapy Education/Self Care 54269   Details: Educated patient on performing quadruped transitions at home only on days he does not have therapy until he is able to perform without flare up of symptoms.    Medbridge Code:    Given symptom relief   Progress: Reinforced   Who provided to: Patient   Level of understanding Verbalized   Timed Minutes      Total Timed Treatment:     55    mins  Total Time of Visit:            55   mins         ASSESSMENT/PLAN     GOALS  Goals                                          Progress Note due by 03/3/21   STG by: 1/31/21 Comments Status   Pt will demonstrate 50 degrees or greater (B) cervical rotaiton   Onging                           LTG by: 2/3/21       Pt will demonstrate 65 degrees or greater (B) cervical rotaiton  Ongoing   Pt will score 10 or less on the NDI   Ongoing   Pt will be indpendent with HEP to include postural strengthening and vestibular exercises   Ongoing   Pt will report no dizziness with bending over for one week Continued to work on quadruped position today, began session with symptoms taking less time to decrease.  Ongoing   Pt will demonstrate SLS for 30 seconds or greater    Ongoing         Assessment/Plan     ASSESSMENT: At start of performing activities in quadruped, patient was able to transition with symptoms of dizziness/throbbing lasting for 15-30 seconds. As session progressed, after each repetition patient was having symptoms last  seconds. Between activities, patient required approx 5 minute seated rest break to allow for symptoms to decrease back to baseline. When performing tall kneeling to standing transfer, patient required Min A to perform due to decreased balance and coordination. Ended session with manual therapy to neck to decrease intensity of tension in neck/head.     PLAN: Continue to work on floor transfers and quadruped position. Utilize manual therapy to address soft tissue and decrease tension headache intensity.     Signature: Lacy Weldon, PT Student    Session was supervised and documentation was reviewed by Juanjose Turcios PT, DPT, ATC.    Signature: Juanjose Turcios PT, DPT, ATC

## 2021-02-25 NOTE — PROGRESS NOTES
"    Chief complaint:   Chief Complaint   Patient presents with   • Head Injury     London is following for a concussion, still having a lot of headache and he thinks they may be getting worse, he has one daily.         Subjective     HPI: This is a 35-year-old male gentleman who been following for a head injury that he sustained while at work.  We have been following him for postconcussive syndrome.  The last time he was here he was still having symptoms with exertional activities.  We did send to physical therapy and continued with activity restrictions.  The patient says that he has been working with therapy and feels like he has made some improvement but he is still having difficulty with headaches.  He says he feels like this starts in the back of his neck and comes up his head to the sides.  He says he gets a headache every day that will last several hours.  He continues to take the Fioricet.  He also does complain of dizziness that has started as well and is working with therapy and vestibular rehab.  He says the headaches can be brought on with activity.  Denies any nausea or vomiting.  Denies any bowel or bladder incontinence.  Denies any radicular arm pain.    Review of Systems   Musculoskeletal: Positive for neck pain.   Neurological: Positive for dizziness and headaches.   Psychiatric/Behavioral: Negative.          Objective      Vital Signs  /80 (BP Location: Right arm, Patient Position: Sitting)   Ht 175.3 cm (69\")   Wt 80.5 kg (177 lb 6.4 oz)   BMI 26.20 kg/m²     Physical Exam  Constitutional:       Appearance: He is well-developed.   HENT:      Head: Normocephalic.   Eyes:      Pupils: Pupils are equal, round, and reactive to light.   Neck:      Musculoskeletal: Normal range of motion.   Pulmonary:      Effort: Pulmonary effort is normal.   Musculoskeletal: Normal range of motion.      Cervical back: He exhibits pain.   Skin:     General: Skin is warm.   Neurological:      Mental Status: He " is alert and oriented to person, place, and time.      GCS: GCS eye subscore is 4. GCS verbal subscore is 5. GCS motor subscore is 6.      Cranial Nerves: No cranial nerve deficit.      Sensory: No sensory deficit.      Gait: Gait normal.      Deep Tendon Reflexes: Reflexes are normal and symmetric.   Psychiatric:         Speech: Speech normal.         Behavior: Behavior normal.         Thought Content: Thought content normal.         Neurologic Exam     Mental Status   Oriented to person, place, and time.   Speech: speech is normal     Cranial Nerves     CN III, IV, VI   Pupils are equal, round, and reactive to light.      Results Review: No new imaging        Assessment/Plan: At this point think it would be prudent to send the patient for an MRI of the brain without contrast as well as an MRI of the cervical spine for further evaluation to see if there is anything from a structural problem that are causing his symptoms.  I will follow-up with him in 4 weeks after the imaging is completed to see if there is anything from a surgical standpoint that needs to be addressed.  We may need to consider sending the patient to neurology for headache evaluation and treatment should there not be any structural problems that require any surgical intervention.  The patient needs to stay off work until we see him back in the office.    Patient is a smoker. Smoking cessation classes given to the patient  The patient's Body mass index is 26.2 kg/m².. BMI is above normal parameters. Recommendations include: educational material and nutrition counseling    Diagnoses and all orders for this visit:    1. Concussion with loss of consciousness of 30 minutes or less, subsequent encounter (Primary)  -     MRI Brain Without Contrast; Future    2. Cervicalgia  -     MRI Cervical Spine Without Contrast; Future    3. Cigarette smoker    4. BMI 26.0-26.9,adult        I discussed the patients findings and my recommendations with  patient  Jose Trujillo, APRN  02/25/21  14:42 CST

## 2021-02-25 NOTE — PATIENT INSTRUCTIONS
"BMI for Adults  What is BMI?  Body mass index (BMI) is a number that is calculated from a person's weight and height. BMI can help estimate how much of a person's weight is composed of fat. BMI does not measure body fat directly. Rather, it is an alternative to procedures that directly measure body fat, which can be difficult and expensive.  BMI can help identify people who may be at higher risk for certain medical problems.  What are BMI measurements used for?  BMI is used as a screening tool to identify possible weight problems. It helps determine whether a person is obese, overweight, a healthy weight, or underweight.  BMI is useful for:  · Identifying a weight problem that may be related to a medical condition or may increase the risk for medical problems.  · Promoting changes, such as changes in diet and exercise, to help reach a healthy weight. BMI screening can be repeated to see if these changes are working.  How is BMI calculated?  BMI involves measuring your weight in relation to your height. Both height and weight are measured, and the BMI is calculated from those numbers. This can be done either in English ([place] or metric measurements. Note that charts and online BMI calculators are available to help you find your BMI quickly and easily without having to do these calculations yourself.  To calculate your BMI in English ([place] measurements:    1. Measure your weight in pounds (lb).  2. Multiply the number of pounds by 703.  ? For example, for a person who weighs 180 lb, multiply that number by 703, which equals 126,540.  3. Measure your height in inches. Then multiply that number by itself to get a measurement called \"inches squared.\"  ? For example, for a person who is 70 inches tall, the \"inches squared\" measurement is 70 inches x 70 inches, which equals 4,900 inches squared.  4. Divide the total from step 2 (number of lb x 703) by the total from step 3 (inches squared): 126,540 ÷ 4,900 = 25.8. This is " Yes I can see him as a new patient.  In the meanwhile if his symptoms get worse or if  develop dizziness chest pain or shortness of breath he should go to ER.   "your BMI.  To calculate your BMI in metric measurements:  1. Measure your weight in kilograms (kg).  2. Measure your height in meters (m). Then multiply that number by itself to get a measurement called \"meters squared.\"  ? For example, for a person who is 1.75 m tall, the \"meters squared\" measurement is 1.75 m x 1.75 m, which is equal to 3.1 meters squared.  3. Divide the number of kilograms (your weight) by the meters squared number. In this example: 70 ÷ 3.1 = 22.6. This is your BMI.  What do the results mean?  BMI charts are used to identify whether you are underweight, normal weight, overweight, or obese. The following guidelines will be used:  · Underweight: BMI less than 18.5.  · Normal weight: BMI between 18.5 and 24.9.  · Overweight: BMI between 25 and 29.9.  · Obese: BMI of 30 or above.  Keep these notes in mind:  · Weight includes both fat and muscle, so someone with a muscular build, such as an athlete, may have a BMI that is higher than 24.9. In cases like these, BMI is not an accurate measure of body fat.  · To determine if excess body fat is the cause of a BMI of 25 or higher, further assessments may need to be done by a health care provider.  · BMI is usually interpreted in the same way for men and women.  Where to find more information  For more information about BMI, including tools to quickly calculate your BMI, go to these websites:  · Centers for Disease Control and Prevention: www.cdc.gov  · American Heart Association: www.heart.org  · National Heart, Lung, and Blood [place]: www.nhlbi.nih.gov  Summary  · Body mass index (BMI) is a number that is calculated from a person's weight and height.  · BMI may help estimate how much of a person's weight is composed of fat. BMI can help identify those who may be at higher risk for certain medical problems.  · BMI can be measured using English measurements or metric measurements.  · BMI charts are used to identify whether you are underweight, normal " "weight, overweight, or obese.  This information is not intended to replace advice given to you by your health care provider. Make sure you discuss any questions you have with your health care provider.  Document Revised: 09/09/2020 Document Reviewed: 07/17/2020  Elsevier Patient Education © 2020 [employer]      DASH Eating Plan  DASH stands for \"Dietary Approaches to Stop Hypertension.\" The DASH eating plan is a healthy eating plan that has been shown to reduce high blood pressure (hypertension). It may also reduce your risk for type 2 diabetes, heart disease, and stroke. The DASH eating plan may also help with weight loss.  What are tips for following this plan?    General guidelines  · Avoid eating more than 2,300 mg (milligrams) of salt (sodium) a day. If you have hypertension, you may need to reduce your sodium intake to 1,500 mg a day.  · Limit alcohol intake to no more than 1 drink a day for nonpregnant women and 2 drinks a day for men. One drink equals 12 oz of beer, 5 oz of wine, or 1½ oz of hard liquor.  · Work with your health care provider to maintain a healthy body weight or to lose weight. Ask what an ideal weight is for you.  · Get at least 30 minutes of exercise that causes your heart to beat faster (aerobic exercise) most days of the week. Activities may include walking, swimming, or biking.  · Work with your health care provider or diet and nutrition specialist (dietitian) to adjust your eating plan to your individual calorie needs.  Reading food labels    · Check food labels for the amount of sodium per serving. Choose foods with less than 5 percent of the Daily Value of sodium. Generally, foods with less than 300 mg of sodium per serving fit into this eating plan.  · To find whole grains, look for the word \"whole\" as the first word in the ingredient list.  Shopping  · Buy products labeled as \"low-sodium\" or \"no salt added.\"  · Buy fresh foods. Avoid canned foods and premade or frozen " meals.  Cooking  · Avoid adding salt when cooking. Use salt-free seasonings or herbs instead of table salt or sea salt. Check with your health care provider or pharmacist before using salt substitutes.  · Do not snider foods. Cook foods using healthy methods such as baking, boiling, grilling, and broiling instead.  · Cook with heart-healthy oils, such as olive, canola, soybean, or sunflower oil.  Meal planning  · Eat a balanced diet that includes:  ? 5 or more servings of fruits and vegetables each day. At each meal, try to fill half of your plate with fruits and vegetables.  ? Up to 6-8 servings of whole grains each day.  ? Less than 6 oz of lean meat, poultry, or fish each day. A 3-oz serving of meat is about the same size as a deck of cards. One egg equals 1 oz.  ? 2 servings of low-fat dairy each day.  ? A serving of nuts, seeds, or beans 5 times each week.  ? Heart-healthy fats. Healthy fats called Omega-3 fatty acids are found in foods such as flaxseeds and coldwater fish, like sardines, salmon, and mackerel.  · Limit how much you eat of the following:  ? Canned or prepackaged foods.  ? Food that is high in trans fat, such as fried foods.  ? Food that is high in saturated fat, such as fatty meat.  ? Sweets, desserts, sugary drinks, and other foods with added sugar.  ? Full-fat dairy products.  · Do not salt foods before eating.  · Try to eat at least 2 vegetarian meals each week.  · Eat more home-cooked food and less restaurant, buffet, and fast food.  · When eating at a restaurant, ask that your food be prepared with less salt or no salt, if possible.  What foods are recommended?  The items listed may not be a complete list. Talk with your dietitian about what dietary choices are best for you.  Grains  Whole-grain or whole-wheat bread. Whole-grain or whole-wheat pasta. Brown rice. Oatmeal. Quinoa. Bulgur. Whole-grain and low-sodium cereals. Sandra bread. Low-fat, low-sodium crackers. Whole-wheat flour  tortillas.  Vegetables  Fresh or frozen vegetables (raw, steamed, roasted, or grilled). Low-sodium or reduced-sodium tomato and vegetable juice. Low-sodium or reduced-sodium tomato sauce and tomato paste. Low-sodium or reduced-sodium canned vegetables.  Fruits  All fresh, dried, or frozen fruit. Canned fruit in natural juice (without added sugar).  Meat and other protein foods  Skinless chicken or turkey. Ground chicken or turkey. Pork with fat trimmed off. Fish and seafood. Egg whites. Dried beans, peas, or lentils. Unsalted nuts, nut butters, and seeds. Unsalted canned beans. Lean cuts of beef with fat trimmed off. Low-sodium, lean deli meat.  Dairy  Low-fat (1%) or fat-free (skim) milk. Fat-free, low-fat, or reduced-fat cheeses. Nonfat, low-sodium ricotta or cottage cheese. Low-fat or nonfat yogurt. Low-fat, low-sodium cheese.  Fats and oils  Soft margarine without trans fats. Vegetable oil. Low-fat, reduced-fat, or light mayonnaise and salad dressings (reduced-sodium). Canola, safflower, olive, soybean, and sunflower oils. Avocado.  Seasoning and other foods  Herbs. Spices. Seasoning mixes without salt. Unsalted popcorn and pretzels. Fat-free sweets.  What foods are not recommended?  The items listed may not be a complete list. Talk with your dietitian about what dietary choices are best for you.  Grains  Baked goods made with fat, such as croissants, muffins, or some breads. Dry pasta or rice meal packs.  Vegetables  Creamed or fried vegetables. Vegetables in a cheese sauce. Regular canned vegetables (not low-sodium or reduced-sodium). Regular canned tomato sauce and paste (not low-sodium or reduced-sodium). Regular tomato and vegetable juice (not low-sodium or reduced-sodium). Pickles. Olives.  Fruits  Canned fruit in a light or heavy syrup. Fried fruit. Fruit in cream or butter sauce.  Meat and other protein foods  Fatty cuts of meat. Ribs. Fried meat. Shannon. Sausage. Bologna and other processed lunch meats.  Salami. Fatback. Hotdogs. Bratwurst. Salted nuts and seeds. Canned beans with added salt. Canned or smoked fish. Whole eggs or egg yolks. Chicken or turkey with skin.  Dairy  Whole or 2% milk, cream, and half-and-half. Whole or full-fat cream cheese. Whole-fat or sweetened yogurt. Full-fat cheese. Nondairy creamers. Whipped toppings. Processed cheese and cheese spreads.  Fats and oils  Butter. Stick margarine. Lard. Shortening. Ghee. Shannon fat. Tropical oils, such as coconut, palm kernel, or palm oil.  Seasoning and other foods  Salted popcorn and pretzels. Onion salt, garlic salt, seasoned salt, table salt, and sea salt. Worcestershire sauce. Tartar sauce. Barbecue sauce. Teriyaki sauce. Soy sauce, including reduced-sodium. Steak sauce. Canned and packaged gravies. Fish sauce. Oyster sauce. Cocktail sauce. Horseradish that you find on the shelf. Ketchup. Mustard. Meat flavorings and tenderizers. Bouillon cubes. Hot sauce and Tabasco sauce. Premade or packaged marinades. Premade or packaged taco seasonings. Relishes. Regular salad dressings.  Where to find more information:  · National Heart, Lung, and Blood [place]: www.nhlbi.nih.gov  · American Heart Association: www.heart.org  Summary  · The DASH eating plan is a healthy eating plan that has been shown to reduce high blood pressure (hypertension). It may also reduce your risk for type 2 diabetes, heart disease, and stroke.  · With the DASH eating plan, you should limit salt (sodium) intake to 2,300 mg a day. If you have hypertension, you may need to reduce your sodium intake to 1,500 mg a day.  · When on the DASH eating plan, aim to eat more fresh fruits and vegetables, whole grains, lean proteins, low-fat dairy, and heart-healthy fats.  · Work with your health care provider or diet and nutrition specialist (dietitian) to adjust your eating plan to your individual calorie needs.  This information is not intended to replace advice given to you by your health  care provider. Make sure you discuss any questions you have with your health care provider.  Document Revised: 11/30/2018 Document Reviewed: 12/11/2017  [employer] Patient Education © 2020 [employer]      Steps to Quit Smoking  Smoking tobacco is the leading cause of preventable death. It can affect almost every organ in the body. Smoking puts you and people around you at risk for many serious, long-lasting (chronic) diseases. Quitting smoking can be hard, but it is one of the best things that you can do for your health. It is never too late to quit.  How do I get ready to quit?  When you decide to quit smoking, make a plan to help you succeed. Before you quit:  · Pick a date to quit. Set a date within the next 2 weeks to give you time to prepare.  · Write down the reasons why you are quitting. Keep this list in places where you will see it often.  · Tell your family, friends, and co-workers that you are quitting. Their support is important.  · Talk with your doctor about the choices that may help you quit.  · Find out if your health insurance will pay for these treatments.  · Know the people, places, things, and activities that make you want to smoke (triggers). Avoid them.  What first steps can I take to quit smoking?  · Throw away all cigarettes at home, at work, and in your car.  · Throw away the things that you use when you smoke, such as ashtrays and lighters.  · Clean your car. Make sure to empty the ashtray.  · Clean your home, including curtains and carpets.  What can I do to help me quit smoking?  Talk with your doctor about taking medicines and seeing a counselor at the same time. You are more likely to succeed when you do both.  · If you are pregnant or breastfeeding, talk with your doctor about counseling or other ways to quit smoking. Do not take medicine to help you quit smoking unless your doctor tells you to do so.  To quit smoking:  Quit right away  · Quit smoking totally, instead of slowly  cutting back on how much you smoke over a period of time.  · Go to counseling. You are more likely to quit if you go to counseling sessions regularly.  Take medicine  You may take medicines to help you quit. Some medicines need a prescription, and some you can buy over-the-counter. Some medicines may contain a drug called nicotine to replace the nicotine in cigarettes. Medicines may:  · Help you to stop having the desire to smoke (cravings).  · Help to stop the problems that come when you stop smoking (withdrawal symptoms).  Your doctor may ask you to use:  · Nicotine patches, gum, or lozenges.  · Nicotine inhalers or sprays.  · Non-nicotine medicine that is taken by mouth.  Find resources  Find resources and other ways to help you quit smoking and remain smoke-free after you quit. These resources are most helpful when you use them often. They include:  · Online chats with a counselor.  · Phone quitlines.  · Printed self-help materials.  · Support groups or group counseling.  · Text messaging programs.  · Mobile phone apps. Use apps on your mobile phone or tablet that can help you stick to your quit plan. There are many free apps for mobile phones and tablets as well as websites. Examples include Quit Guide from the CDC and smokefree.gov    What things can I do to make it easier to quit?    · Talk to your family and friends. Ask them to support and encourage you.  · Call a phone quitline (8-800-QUIT-NOW), reach out to support groups, or work with a counselor.  · Ask people who smoke to not smoke around you.  · Avoid places that make you want to smoke, such as:  ? Bars.  ? Parties.  ? Smoke-break areas at work.  · Spend time with people who do not smoke.  · Lower the stress in your life. Stress can make you want to smoke. Try these things to help your stress:  ? Getting regular exercise.  ? Doing deep-breathing exercises.  ? Doing yoga.  ? Meditating.  ? Doing a body scan. To do this, close your eyes, focus on one  area of your body at a time from head to toe. Notice which parts of your body are tense. Try to relax the muscles in those areas.  How will I feel when I quit smoking?  Day 1 to 3 weeks  Within the first 24 hours, you may start to have some problems that come from quitting tobacco. These problems are very bad 2-3 days after you quit, but they do not often last for more than 2-3 weeks. You may get these symptoms:  · Mood swings.  · Feeling restless, nervous, angry, or annoyed.  · Trouble concentrating.  · Dizziness.  · Strong desire for high-sugar foods and nicotine.  · Weight gain.  · Trouble pooping (constipation).  · Feeling like you may vomit (nausea).  · Coughing or a sore throat.  · Changes in how the medicines that you take for other issues work in your body.  · Depression.  · Trouble sleeping (insomnia).  Week 3 and afterward  After the first 2-3 weeks of quitting, you may start to notice more positive results, such as:  · Better sense of smell and taste.  · Less coughing and sore throat.  · Slower heart rate.  · Lower blood pressure.  · Clearer skin.  · Better breathing.  · Fewer sick days.  Quitting smoking can be hard. Do not give up if you fail the first time. Some people need to try a few times before they succeed. Do your best to stick to your quit plan, and talk with your doctor if you have any questions or concerns.  Summary  · Smoking tobacco is the leading cause of preventable death. Quitting smoking can be hard, but it is one of the best things that you can do for your health.  · When you decide to quit smoking, make a plan to help you succeed.  · Quit smoking right away, not slowly over a period of time.  · When you start quitting, seek help from your doctor, family, or friends.  This information is not intended to replace advice given to you by your health care provider. Make sure you discuss any questions you have with your health care provider.  Document Revised: 09/11/2020 Document Reviewed:  03/07/2020  Elsevier Patient Education © 2020 [employer]

## 2021-03-02 ENCOUNTER — TREATMENT (OUTPATIENT)
Dept: PHYSICAL THERAPY | Facility: CLINIC | Age: 36
End: 2021-03-02

## 2021-03-02 DIAGNOSIS — S09.90XA HEAD TRAUMA, INITIAL ENCOUNTER: ICD-10-CM

## 2021-03-02 DIAGNOSIS — M54.2 CERVICALGIA: ICD-10-CM

## 2021-03-02 DIAGNOSIS — S06.0X1A CONCUSSION WITH LOSS OF CONSCIOUSNESS OF 30 MINUTES OR LESS, INITIAL ENCOUNTER: Primary | ICD-10-CM

## 2021-03-02 PROCEDURE — 97110 THERAPEUTIC EXERCISES: CPT | Performed by: PHYSICAL THERAPIST

## 2021-03-02 PROCEDURE — 97112 NEUROMUSCULAR REEDUCATION: CPT | Performed by: PHYSICAL THERAPIST

## 2021-03-02 PROCEDURE — 97140 MANUAL THERAPY 1/> REGIONS: CPT | Performed by: PHYSICAL THERAPIST

## 2021-03-02 NOTE — PROGRESS NOTES
"Physical Therapy Treatment Note    Patient: London Calabrese                                                                                     Today's Date: 3/2/2021  :     1985    Referring practitioner:    Anthony Orona MD  Date of Initial Visit:          Type: THERAPY  Noted: 2020    Patient seen for 24 sessions    Visit Diagnoses:    ICD-10-CM ICD-9-CM   1. Concussion with loss of consciousness of 30 minutes or less, initial encounter  S06.0X1A 850.11   2. Cervicalgia  M54.2 723.1   3. Head trauma, initial encounter  S09.90XA 959.01     SUBJECTIVE     Subjective He reports a \"slight headache.\" He states today will likely be his last visit for a while , Dr. Orona is going to have him do more testing.     PAIN: pre 5/10 post 4/10         OBJECTIVE     Objective      Therapeutic Exercises    [id number] Comments   B unilateral reclined prone face pull with ER with #5 x 20     B unilateral reclined prone shoulder flexion with #3 x 20                 Timed Minutes 15     Manual Therapy     [id number]  Comments   Cervical suboccipital releases, manual traction, and traction with B lateral bends Grade 3 sustained HL   STM B UT,LS, and suboccipital region Sitting with B UE on pillows               Timed Minutes 30     Neuromuscular Reeducation     [id number] Comments   DTR all extremities (see assessment section)    Walking with head turns performing ball hand to hand naming random girl names, counting by 3's, and naming states alphabetically    Walking backwards counting down from 100 performing ball hand to hand             Timed Minutes 15          Therapy Education/Self Care [id number]   Details:    [employer] Code:    Given Home Exercise Program   Progress: Reinforced   Who provided to: Patient   Level of understanding Verbalized   Timed Minutes      Total Timed Treatment:    60    mins  Total Time of Visit:             60   mins         ASSESSMENT/PLAN     GOALS  Goals                                          Progress " Note due by 03/3/21   STG by: 1/31/21 Comments Status   Pt will demonstrate 50 degrees or greater (B) cervical rotaiton   Onging                           LTG by: 2/3/21       Pt will demonstrate 65 degrees or greater (B) cervical rotaiton   Ongoing   Pt will score 10 or less on the NDI   Ongoing   Pt will be indpendent with HEP to include postural strengthening and vestibular exercises   Ongoing   Pt will report no dizziness with bending over for one week No dizziness reported today with reclined prone Ongoing   Pt will demonstrate SLS for 30 seconds or greater    Ongoing         Assessment/Plan     ASSESSMENT: He had difficulty with coordination with the neuromuscular re-education activities today but his balance was much improved. His symptoms are very persistent     PLAN: Review all goals and assess if he has had a status change    Signature: Eusebio Gonzalez, PTA

## 2021-03-04 ENCOUNTER — TREATMENT (OUTPATIENT)
Dept: PHYSICAL THERAPY | Facility: CLINIC | Age: 36
End: 2021-03-04

## 2021-03-04 DIAGNOSIS — S06.0X1A CONCUSSION WITH LOSS OF CONSCIOUSNESS OF 30 MINUTES OR LESS, INITIAL ENCOUNTER: Primary | ICD-10-CM

## 2021-03-04 DIAGNOSIS — S09.90XA HEAD TRAUMA, INITIAL ENCOUNTER: ICD-10-CM

## 2021-03-04 DIAGNOSIS — M54.2 CERVICALGIA: ICD-10-CM

## 2021-03-04 PROCEDURE — 97112 NEUROMUSCULAR REEDUCATION: CPT | Performed by: PHYSICAL THERAPIST

## 2021-03-04 PROCEDURE — 97140 MANUAL THERAPY 1/> REGIONS: CPT | Performed by: PHYSICAL THERAPIST

## 2021-03-04 NOTE — PROGRESS NOTES
Physical Therapy Treatment Note    Patient: London Calabrese                                                                                     Today's Date: 3/4/2021  :     1985    Referring practitioner:    Anthony Orona MD  Date of Initial Visit:          Type: THERAPY  Noted: 2020    Patient seen for 25 sessions    Visit Diagnoses:    ICD-10-CM ICD-9-CM   1. Concussion with loss of consciousness of 30 minutes or less, initial encounter  S06.0X1A 850.11   2. Cervicalgia  M54.2 723.1   3. Head trauma, initial encounter  S09.90XA 959.01     SUBJECTIVE     Subjective He went too the oral surgeon since the last visit and is having a little more pain especially when he breathes in.    PAIN: pre 10 post 4/10         OBJECTIVE     Objective      Neuromuscular Reeducation     [id number] Comments   B side steps with throw and rebound x 40 ft first with 45 cm SB progressed to blue balloon                     Timed Minutes 30     [id number]  Comments   Cervical suboccipital releases, manual traction, and traction with B lateral bends Grade 3 sustained HL                         Timed Minutes  15         Therapy Education/Self Care [id number]   Details:    [employer]bridge Code:    Given symptom relief   Progress: Reinforced   Who provided to: Patient   Level of understanding Verbalized   Timed Minutes      Total Timed Treatment:     45   mins  Total Time of Visit:             45   mins         ASSESSMENT/PLAN     GOALS  Goals                                          Progress Note due by 03/3/21   STG by: 21 Comments Status   Pt will demonstrate 50 degrees or greater (B) cervical rotaiton   Onging                           LTG by: 2/3/21       Pt will demonstrate 65 degrees or greater (B) cervical rotaiton   Ongoing   Pt will score 10 or less on the NDI   Ongoing   Pt will be indpendent with HEP to include postural strengthening and vestibular exercises   Ongoing   Pt will report no dizziness with bending over for one week  More difficulty stepping to his L with toss and rebounding activities today Ongoing   Pt will demonstrate SLS for 30 seconds or greater    Ongoing         Assessment/Plan     ASSESSMENT: He reported a little more oral pain symptoms as well as his usual symptom reports. I believe the neuromuscular activities today were ideal for his progression but he did have more difficulty moving to his L as opposed to his R.    PLAN: Continue to progress his visual tracking with L lateral movement by adding the agility ladder.    Signature: Eusebio Gonzalez, PTA

## 2021-03-09 ENCOUNTER — TREATMENT (OUTPATIENT)
Dept: PHYSICAL THERAPY | Facility: CLINIC | Age: 36
End: 2021-03-09

## 2021-03-09 DIAGNOSIS — M54.2 CERVICALGIA: ICD-10-CM

## 2021-03-09 DIAGNOSIS — S06.0X1A CONCUSSION WITH LOSS OF CONSCIOUSNESS OF 30 MINUTES OR LESS, INITIAL ENCOUNTER: Primary | ICD-10-CM

## 2021-03-09 DIAGNOSIS — S09.90XA HEAD TRAUMA, INITIAL ENCOUNTER: ICD-10-CM

## 2021-03-09 PROCEDURE — 97112 NEUROMUSCULAR REEDUCATION: CPT | Performed by: PHYSICAL THERAPIST

## 2021-03-09 PROCEDURE — 97110 THERAPEUTIC EXERCISES: CPT | Performed by: PHYSICAL THERAPIST

## 2021-03-09 PROCEDURE — 97530 THERAPEUTIC ACTIVITIES: CPT | Performed by: PHYSICAL THERAPIST

## 2021-03-09 NOTE — PROGRESS NOTES
Physical Therapy 30 Day Progress Note    Patient: London Calabrese                                                                                     Today's Date: 3/9/2021  :     1985    Referring practitioner:    Anthony Orona MD  Date of Initial Visit:          Type: THERAPY  Noted: 2020    Patient seen for 26 sessions    Visit Diagnoses:    ICD-10-CM ICD-9-CM   1. Concussion with loss of consciousness of 30 minutes or less, initial encounter  S06.0X1A 850.11   2. Cervicalgia  M54.2 723.1   3. Head trauma, initial encounter  S09.90XA 959.01     SUBJECTIVE     Subjective He reports nausea right now, HA pain and dizziness in sitting    PAIN: pre 4/10 post 3/10    PT G-Codes  Outcome Measure Options: Neck Disability Index (NDI)  Neck Disability Index Score: 33    OBJECTIVE     Objective      Therapeutic Activities    32440 Comments   Reviewed all goals for progress note (see goals section) also obtained an updated NDI                    Timed Minutes 10     Therapeutic Exercises    84464 Comments   Unicam seat#5 res 3                     Timed Minutes 10     Neuromuscular Reeducation     32203 Comments   B side steps with throw and rebound x 40 ft first with 45 cm SB progressed to blue baloon    NS static surface EO/EC     RS EOEC on theradisks    NS EO on theradisks    B tandem stance             Timed Minutes 30     68108  Comments   Cervical suboccipital releases, manual traction, and traction with B lateral bends Grade 3 sustained HL                           Timed Minutes  10         Therapy Education/Self Care 93912   Details:    Medbridge Code:    Given symptom relief   Progress: Reinforced   Who provided to: Patient   Level of understanding Verbalized   Timed Minutes      Total Timed Treatment:     60   mins  Total Time of Visit:             60   mins         ASSESSMENT/PLAN     GOALS  Goals                                          Progress Note due by 21   STG by: 21 Comments Status    Pt will demonstrate 50 degrees or greater (B) cervical rotaiton  met 3/9/21 see LTG #1 Met                           LTG by: 2/3/21       Pt will demonstrate 65 degrees or greater (B) cervical rotaiton  B rotation 56 degrees  Ongoing   Pt will score 10 or less on the NDI  33 today Ongoing   Pt will be indpendent with HEP to include postural strengthening and vestibular exercises  reinforced today Ongoing   Pt will report no dizziness with bending over for one week Dizziness in sitting today Ongoing   Pt will demonstrate SLS for 30 seconds or greater   6 sec L LE 7 sec R LE Ongoing            Assessment/Plan     ASSESSMENT: He has met his STG for cervical AROM as of today. While his NDI score is unchanged some of the components have improved as compared to the last entry such as pain intensity while some others have increased such as frequency of headaches. He will be having MRI's on the date of his next session and hopefully they will shed some light as far as the cause of his frequent headaches. He didn't fair as well today with balance and a majority of the neuromuscular re-education activities today as he has compared to the last couple of sessions. However, all the treatment areas were more crowded and/or noisy today which could not be avoided this date.    PLAN: Review his MRI's, continue static and dynamic balance activities with and without visual tracking tasks.    Signature: Eusebio Gonzalez, PTA

## 2021-03-11 ENCOUNTER — HOSPITAL ENCOUNTER (OUTPATIENT)
Dept: MRI IMAGING | Facility: HOSPITAL | Age: 36
Discharge: HOME | End: 2021-03-11
Admitting: NURSE PRACTITIONER

## 2021-03-11 ENCOUNTER — TREATMENT (OUTPATIENT)
Dept: PHYSICAL THERAPY | Facility: CLINIC | Age: 36
End: 2021-03-11

## 2021-03-11 DIAGNOSIS — M54.2 CERVICALGIA: ICD-10-CM

## 2021-03-11 DIAGNOSIS — S09.90XA HEAD TRAUMA, INITIAL ENCOUNTER: ICD-10-CM

## 2021-03-11 DIAGNOSIS — S06.0X1A CONCUSSION WITH LOSS OF CONSCIOUSNESS OF 30 MINUTES OR LESS, INITIAL ENCOUNTER: Primary | ICD-10-CM

## 2021-03-11 DIAGNOSIS — S06.0X1D CONCUSSION WITH LOSS OF CONSCIOUSNESS OF 30 MINUTES OR LESS, SUBSEQUENT ENCOUNTER: ICD-10-CM

## 2021-03-11 PROCEDURE — 72141 MRI NECK SPINE W/O DYE: CPT

## 2021-03-11 PROCEDURE — 97110 THERAPEUTIC EXERCISES: CPT | Performed by: PHYSICAL THERAPIST

## 2021-03-11 PROCEDURE — 70551 MRI BRAIN STEM W/O DYE: CPT

## 2021-03-11 PROCEDURE — 97112 NEUROMUSCULAR REEDUCATION: CPT | Performed by: PHYSICAL THERAPIST

## 2021-03-11 PROCEDURE — 97140 MANUAL THERAPY 1/> REGIONS: CPT | Performed by: PHYSICAL THERAPIST

## 2021-03-11 NOTE — PROGRESS NOTES
Physical Therapy Treatment Note    Patient: London Calabrese                                                                                     Today's Date: 3/11/2021  :     1985    Referring practitioner:    Anthony Orona MD  Date of Initial Visit:          Type: THERAPY  Noted: 2020    Patient seen for 27 sessions    Visit Diagnoses:    ICD-10-CM ICD-9-CM   1. Concussion with loss of consciousness of 30 minutes or less, initial encounter  S06.0X1A 850.11   2. Cervicalgia  M54.2 723.1   3. Head trauma, initial encounter  S09.90XA 959.01     SUBJECTIVE     Subjective He had an MRI on the head and neck this morning and that seemed to irritate his symptoms to some extent.    PAIN: pre 10 post 4/10         OBJECTIVE     Objective      Neuromuscular Reeducation     33437 Comments   Ambulation reaching outside MANSOOR and visual scanning for small ball                Timed Minutes 8     Manual Therapy 97376  Comments   Cervical suboccipital releases, manual traction, and traction with B lateral bends Grade 3 sustained HL                         Timed Minutes  12     Therapeutic Exercises    83676 Comments   Unicam seat lvl 6 resistance 2 10 min    Quadruped swiss ball rollouts on 55 cm SB 2 x 10   Quadruped bird dogs on 55 cm SB 2 x 5           Timed Minutes 20       Therapy Education/Self Care 86192   Details:    Medbridge Code:    Given symptom relief   Progress: Reinforced   Who provided to: Patient   Level of understanding Verbalized   Timed Minutes      Total Timed Treatment:     40   mins  Total Time of Visit:             45   mins         ASSESSMENT/PLAN     GOALS  Goals                                          Progress Note due by 21   STG by: 21 Comments Status   Pt will demonstrate 50 degrees or greater (B) cervical rotaiton   Onging                           LTG by: 2/3/21       Pt will demonstrate 65 degrees or greater (B) cervical rotaiton   Ongoing   Pt will score 10 or less on the  NDI   Ongoing   Pt will be indpendent with HEP to include postural strengthening and vestibular exercises  He has been working on prone and quadruped activity at home, however still has some dizziness Ongoing   Pt will report no dizziness with bending over for one week  Ongoing   Pt will demonstrate SLS for 30 seconds or greater    Ongoing         Assessment/Plan     ASSESSMENT: He had MRI the brain and neck this morning, however results were not available prior to his session this afternoon. He was a little flared following testing this morning. During quadruped roll outs he still had an increas in symptoms but not as bad as position on the floor in quadruped. With ambulation and visual scanning for ball and reaching outside base of support he demonstrated increased balance without a single LOB.    PLAN: Continue to progress visual tracking and symptoms in quadruped/prone positioning. Add in agility ladder with tracking and lateral movements.    Signature: Juanjose Turcios, PT DPT

## 2021-03-16 ENCOUNTER — TREATMENT (OUTPATIENT)
Dept: PHYSICAL THERAPY | Facility: CLINIC | Age: 36
End: 2021-03-16

## 2021-03-16 DIAGNOSIS — M54.2 CERVICALGIA: ICD-10-CM

## 2021-03-16 DIAGNOSIS — S09.90XA HEAD TRAUMA, INITIAL ENCOUNTER: ICD-10-CM

## 2021-03-16 DIAGNOSIS — S06.0X1A CONCUSSION WITH LOSS OF CONSCIOUSNESS OF 30 MINUTES OR LESS, INITIAL ENCOUNTER: Primary | ICD-10-CM

## 2021-03-16 PROCEDURE — 97110 THERAPEUTIC EXERCISES: CPT | Performed by: PHYSICAL THERAPIST

## 2021-03-16 PROCEDURE — 97112 NEUROMUSCULAR REEDUCATION: CPT | Performed by: PHYSICAL THERAPIST

## 2021-03-16 NOTE — PROGRESS NOTES
Physical Therapy Treatment Note    Patient: Londno Calabrese                                                                                     Visit Date: 3/16/2021  :     1985    Referring practitioner:    Anthony Orona MD  Date of Initial Visit:          Type: THERAPY  Noted: 2020    Patient seen for 28 sessions    Visit Diagnoses:    ICD-10-CM ICD-9-CM   1. Concussion with loss of consciousness of 30 minutes or less, initial encounter  S06.0X1A 850.11   2. Cervicalgia  M54.2 723.1   3. Head trauma, initial encounter  S09.90XA 959.01     SUBJECTIVE     Subjective He reports he has been pretty good, had a HA all day yesterday. He ran out of medication and the HA pain has been a little worse, small HA today.    PAIN: pre 6/10         OBJECTIVE     Objective      Therapeutic Exercises    79174 Comments   Prone T', W's, and Y's on 65 cm SB x10 with rest breaks as needed between each                    Timed Minutes 10     Neuromuscular Reeducation     54743 Comments   B WS'ing on wobble board with opposing hand to hand     B side steps with throw and rebound x 40 ft first with orange ball progressed to tennis ball                Timed Minutes 30     Therapy Education/Self Care 03714   Details:    Medbridge Code:    Given symptom relief   Progress: New   Who provided to: Patient   Level of understanding Verbalized   Timed Minutes      Total Timed Treatment:     40   mins  Total Time of Visit:            40   mins         ASSESSMENT/PLAN     GOALS    Goals                                          Progress Note due by 21   STG by: 21 Comments Status   Pt will demonstrate 50 degrees or greater (B) cervical rotaiton   Onging                           LTG by: 2/3/21       Pt will demonstrate 65 degrees or greater (B) cervical rotaiton   Ongoing   Pt will score 10 or less on the NDI   Ongoing   Pt will be indpendent with HEP to include postural strengthening and vestibular exercises   Ongoing   Pt  will report no dizziness with bending over for one week  increased dizziness with prone activities on 65 cm SB today Ongoing   Pt will demonstrate SLS for 30 seconds or greater    Ongoing       Assessment/Plan     ASSESSMENT: He had a great deal of difficulty with the prone position today stating that it felt like all the blood was rushing to the front of his head    PLAN: Consider having him do some fielding first with SB then progressed to soccer ball then smaller objects    Signature: Eusebio Gonzalez, PTA

## 2021-03-18 ENCOUNTER — TREATMENT (OUTPATIENT)
Dept: PHYSICAL THERAPY | Facility: CLINIC | Age: 36
End: 2021-03-18

## 2021-03-18 DIAGNOSIS — S09.90XA HEAD TRAUMA, INITIAL ENCOUNTER: ICD-10-CM

## 2021-03-18 DIAGNOSIS — S06.0X1A CONCUSSION WITH LOSS OF CONSCIOUSNESS OF 30 MINUTES OR LESS, INITIAL ENCOUNTER: Primary | ICD-10-CM

## 2021-03-18 DIAGNOSIS — M54.2 CERVICALGIA: ICD-10-CM

## 2021-03-18 PROCEDURE — 97140 MANUAL THERAPY 1/> REGIONS: CPT | Performed by: PHYSICAL THERAPIST

## 2021-03-18 PROCEDURE — 97112 NEUROMUSCULAR REEDUCATION: CPT | Performed by: PHYSICAL THERAPIST

## 2021-03-18 NOTE — PROGRESS NOTES
Physical Therapy Treatment Note    Patient: London Calabrese                                                                                     Visit Date: 3/18/2021  :     1985    Referring practitioner:    Anthony Orona MD  Date of Initial Visit:          Type: THERAPY  Noted: 2020    Patient seen for 29 sessions    Visit Diagnoses:    ICD-10-CM ICD-9-CM   1. Concussion with loss of consciousness of 30 minutes or less, initial encounter  S06.0X1A 850.11   2. Cervicalgia  M54.2 723.1   3. Head trauma, initial encounter  S09.90XA 959.01     SUBJECTIVE     Subjective He reports he didn't sleep well last night has some HA pain today    PAIN: pre 4/10         OBJECTIVE     Objective      Neuromuscular Reeducation     23730 Comments   Jerome with 65, 55,cm SB progressing to soccer ball, small basket ball to tennis ball    Visual tracking flaslight beam in standing in the dark                Timed Minutes 30     63791  Comments   Cervical suboccipital releases, manual traction, and traction with B lateral bends Grade 3 sustained HL                           Timed Minutes  10         Therapy Education/Self Care 80842   Details:    Medbridge Code:    Given postural retraining   Progress: New   Who provided to: Patient   Level of understanding Verbalized   Timed Minutes      Total Timed Treatment:     40   mins  Total Time of Visit:             40   mins         ASSESSMENT/PLAN     GOALS  Goals                                          Progress Note due by 21   STG by: 21 Comments Status   Pt will demonstrate 50 degrees or greater (B) cervical rotaiton   Onging                           LTG by: 2/3/21       Pt will demonstrate 65 degrees or greater (B) cervical rotaiton   Ongoing   Pt will score 10 or less on the NDI   Ongoing   Pt will be indpendent with HEP to include postural strengthening and vestibular exercises   Ongoing   Pt will report no dizziness with bending over for one week Some  dizziness today with fielding task Ongoing   Pt will demonstrate SLS for 30 seconds or greater             Assessment/Plan     ASSESSMENT: He is still most symptomatic when moving to his L and especially when bending over moving to his L which the fielding activity had him do.     PLAN: Review all goals and complete an updated continued authorization survey for more authorized visits.    Signature: Eusebio Gonzalez, PTA

## 2021-03-23 ENCOUNTER — TREATMENT (OUTPATIENT)
Dept: PHYSICAL THERAPY | Facility: CLINIC | Age: 36
End: 2021-03-23

## 2021-03-23 DIAGNOSIS — S06.0X1A CONCUSSION WITH LOSS OF CONSCIOUSNESS OF 30 MINUTES OR LESS, INITIAL ENCOUNTER: Primary | ICD-10-CM

## 2021-03-23 DIAGNOSIS — M54.2 CERVICALGIA: ICD-10-CM

## 2021-03-23 DIAGNOSIS — S09.90XA HEAD TRAUMA, INITIAL ENCOUNTER: ICD-10-CM

## 2021-03-23 PROCEDURE — 97112 NEUROMUSCULAR REEDUCATION: CPT | Performed by: PHYSICAL THERAPIST

## 2021-03-23 PROCEDURE — 97140 MANUAL THERAPY 1/> REGIONS: CPT | Performed by: PHYSICAL THERAPIST

## 2021-03-23 PROCEDURE — 97110 THERAPEUTIC EXERCISES: CPT | Performed by: PHYSICAL THERAPIST

## 2021-03-23 NOTE — PROGRESS NOTES
Progress Note Addendum      Patient: London Calabrese           : 1985  Visit Date: 3/23/2021  Referring practitioner: Anthony Orona MD  Date of Initial Visit: Type: THERAPY  Noted: 2020  Patient seen for 30 sessions  Visit Diagnoses:    ICD-10-CM ICD-9-CM   1. Concussion with loss of consciousness of 30 minutes or less, initial encounter  S06.0X1A 850.11   2. Cervicalgia  M54.2 723.1   3. Head trauma, initial encounter  S09.90XA 959.01       PT G-Codes  Outcome Measure Options: Neck Disability Index (NDI)  Neck Disability Index Score: 35  Clinical Progress: improved  Home Program Compliance: Yes  Treatment has included: therapeutic exercise, neuromuscular re-education, manual therapy, therapeutic activity and gait training  Progress toward previous goals: Partially Met  Prognosis to achieve goals: good    Subjective   Objective   Assessment & Plan     Assessment  Impairments: abnormal coordination, abnormal gait, activity intolerance and impaired balance  Prognosis: good  Functional Limitations: sleeping, walking, standing and stooping  Plan  Planned therapy interventions: manual therapy, balance/weight-bearing training, body mechanics training, neuromuscular re-education, motor coordination training, postural training, fine motor coordination training, soft tissue mobilization, spinal/joint mobilization, strengthening, functional ROM exercises, flexibility, gait training, stretching, therapeutic activities, home exercise program, IADL retraining and joint mobilization  Frequency: 2x week  Duration in visits: 8  Duration in weeks: 4  Treatment plan discussed with: patient        I have reviewed the progress note information provided by Pearl Malik PTA, and I concur with the findings.    Juanjose Turcios, PT DPT  Physical Therapist

## 2021-03-23 NOTE — PROGRESS NOTES
"Physical Therapy Treatment Note and 30 Day Progress Note    Patient: London Calabrese                                                                                     Visit Date: 3/23/2021  :     1985    Referring practitioner:    Anthony Orona MD  Date of Initial Visit:          Type: THERAPY  Noted: 2020    Patient seen for 30 sessions    Visit Diagnoses:    ICD-10-CM ICD-9-CM   1. Concussion with loss of consciousness of 30 minutes or less, initial encounter  S06.0X1A 850.11   2. Cervicalgia  M54.2 723.1   3. Head trauma, initial encounter  S09.90XA 959.01     SUBJECTIVE     Subjective He reports that his pain is a 5.5/10 today and that filling out the NDI was difficult due to concentration and neck pain. He reports that he continues to feel like he is making progress but still has difficulty with concentration and playing with his 2 year old. He reports that getting on the ground to play with him makes him nauseous and he feels like \"all the blood is running to my head\". Weightlifting, putting groceries up, putting dishes in the cabinet are all still difficult. He reports that he has noticed the most improvement in his ability to play catch with is son.      PAIN: pre 5.5/10   Post 4/10 post manual but 5/10 post exercise.          OBJECTIVE     Objective      Neuromuscular Reeducation     07616 Comments   Lateral side stepping over cones  x6 initial LOB but was able to self correct and had improved form post session.    Lateral squats to  cones but keeping head and neck in neutral position.  Increased dizziness and HA, but he reports not as bad as looking down to pick something up.                Timed Minutes 8     Manual Therapy     21261  Comments   Cervical paraspinals and B UT STM in supine    Cervical traction and traction with side bends     Suboccipital release             Timed Minutes 20        Therapeutic Exercises    71563 Comments   Addressed all goals for progress note   "   NDI    Cervical rotation ROM assessment             Timed Minutes 15           Therapy Education/Self Care 64688   Details: Continue with current HEP, await authorizations, squat to  items off of the floor vs looking down.    Medbridge Code:    Given Home Exercise Program, postural retraining and symptom relief   Progress: New and Reinforced   Who provided to: Patient   Level of understanding Demonstrated   Timed Minutes      Total Timed Treatment:     40   mins  Total Time of Visit:             40   mins         ASSESSMENT/PLAN     GOALS       Goals                                          Progress Note due by 4/22/2021   STG by: 1/31/21 Comments Status   Pt will demonstrate 50 degrees or greater (B) cervical rotaiton  58 to the R, 55 to the L.  Met    LTG by: 2/3/21       Pt will demonstrate 65 degrees or greater (B) cervical rotation  see STG #1  Ongoing   Pt will score 10 or less on the NDI  35/50 Ongoing   Pt will be indpendent with HEP to include postural strengthening and vestibular exercises  He reports compliance with HEP at home as we continue to progress.  Ongoing   Pt will report no dizziness with bending over for one week He reports getting dizzy this morning and his dizziness is daily  Ongoing   Pt will demonstrate SLS for 30 seconds or greater   9 seconds LLE, 7 seconds RLE  Ongoing          Assessment/Plan     ASSESSMENT: Mr. Calabrese has met one goal at this time and is making progress towards all others. His NDI score was slightly increased today due to increased pain today and he had difficulty filling the form out due to increased dizziness and decreased concentration. He has met his STG for cervical ROM into rotation bilaterally but still lacking to achieve WNL and LTG. He was tight more so in his R UT and cervical paraspinals but this improved post session. His SLS time was improved but still shaky and unable to achieve his goal at this time. He reports continued improvement overall but  still has difficulty when playing with his 2 year old son, doing daily tasks such as bending over to load/unload , bathe son in bathtub, lift groceries, etc. Due to his ongoing symptoms. He would benefit from continued skilled Physical Therapy to continue to improve his cervical ROM, decrease neck pain and HA's, and improve his neuromuscular strength and proprioception.      PLAN: Progress with standing balance and higher cognitive tasks, continue fielding and consider lifting. Continue with manual as needed to decrease pain and HA symptoms.     Signature: Pearl Malik, PTA

## 2021-03-25 ENCOUNTER — TREATMENT (OUTPATIENT)
Dept: PHYSICAL THERAPY | Facility: CLINIC | Age: 36
End: 2021-03-25

## 2021-03-25 DIAGNOSIS — S06.0X1A CONCUSSION WITH LOSS OF CONSCIOUSNESS OF 30 MINUTES OR LESS, INITIAL ENCOUNTER: Primary | ICD-10-CM

## 2021-03-25 DIAGNOSIS — M54.2 CERVICALGIA: ICD-10-CM

## 2021-03-25 DIAGNOSIS — S09.90XA HEAD TRAUMA, INITIAL ENCOUNTER: ICD-10-CM

## 2021-03-25 PROCEDURE — 97140 MANUAL THERAPY 1/> REGIONS: CPT | Performed by: PHYSICAL THERAPIST

## 2021-03-25 PROCEDURE — 97112 NEUROMUSCULAR REEDUCATION: CPT | Performed by: PHYSICAL THERAPIST

## 2021-03-25 NOTE — PROGRESS NOTES
Physical Therapy Treatment Note    Patient: London Calabrese                                                                                     Visit Date: 3/25/2021  :     1985    Referring practitioner:    No ref. provider found  Date of Initial Visit:          Type: THERAPY  Noted: 2020    Patient seen for 31 sessions    Visit Diagnoses:    ICD-10-CM ICD-9-CM   1. Concussion with loss of consciousness of 30 minutes or less, initial encounter  S06.0X1A 850.11   2. Cervicalgia  M54.2 723.1   3. Head trauma, initial encounter  S09.90XA 959.01     SUBJECTIVE     Subjective Pt reports increased intensity in headache last night and this morning that in the frontal portion of the head and into the left orbital region.    PAIN: pre 6/10         OBJECTIVE     Objective      Neuromuscular Reeducation     78173 Comments   Kicking soccer ball with RLE and LLE working on balance and coordination     Soccer ball bounce pass  Struggled initially but improved with practice   Catch and throw soccer ball     Alternating catching and throwing small basketball and football working on tracking as well as coordination     Quadruped mule kicks and Ys    Timed Minutes 35     Manual Therapy 09338  Comments   Cervical suboccipital releases, manual traction, and traction with B lateral bends Grade 3 sustained HL                           Timed Minutes  10         Therapy Education/Self Care 28542   Details:    Medbridge Code:    Given postural retraining   Progress: New   Who provided to: Patient   Level of understanding Verbalized   Timed Minutes      Total Timed Treatment:     45   mins  Total Time of Visit:             45   mins         ASSESSMENT/PLAN     GOALS       Goals                                          Progress Note due by 21   STG by: 21 Comments Status   Pt will demonstrate 50 degrees or greater (B) cervical rotaiton   Onging                           LTG by: 2/3/21       Pt will demonstrate 65 degrees  or greater (B) cervical rotaiton   Ongoing   Pt will score 10 or less on the NDI   Ongoing   Pt will be indpendent with HEP to include postural strengthening and vestibular exercises   Ongoing   Pt will report no dizziness with bending over for one week Mild dizziness initially with bouncing a ball Ongoing   Pt will demonstrate SLS for 30 seconds or greater             Assessment/Plan     ASSESSMENT:  London presented today with increased headache that started last night and remained this morning. We started with manual therapy and this helped to alleviate his pain some. He initially had trouble with bounce pass and catching the soccer ball but with practice this improved and he was able to complete without dropping the ball. He also did much better with catching and throwing the soccer ball and football demonstrating increased consistency when catching the ball and not requiring additional support for balance. During quadruped today he demonstrated slight instability with R sided mule kicks but overall much better than the last time we tried this activity. He also was able to perform quadruped Y's without LOB which was not the case the prior time this activity was completed.    PLAN: Continue to progress with dynamic balance and proprioceptive tasks. Work in the quadruped position and address HA with manual as needed.    Signature: Juanjose Turcios PT, DPT

## 2021-03-30 ENCOUNTER — OFFICE VISIT (OUTPATIENT)
Dept: NEUROSURGERY | Facility: CLINIC | Age: 36
End: 2021-03-30

## 2021-03-30 ENCOUNTER — OUTSIDE FACILITY SERVICE (OUTPATIENT)
Dept: NEUROSURGERY | Facility: CLINIC | Age: 36
End: 2021-03-30

## 2021-03-30 ENCOUNTER — HOSPITAL ENCOUNTER (OUTPATIENT)
Dept: GENERAL RADIOLOGY | Facility: HOSPITAL | Age: 36
Discharge: HOME | End: 2021-03-30
Admitting: NEUROLOGICAL SURGERY

## 2021-03-30 ENCOUNTER — TREATMENT (OUTPATIENT)
Dept: PHYSICAL THERAPY | Facility: CLINIC | Age: 36
End: 2021-03-30

## 2021-03-30 VITALS
HEIGHT: 69 IN | WEIGHT: 176.4 LBS | BODY MASS INDEX: 26.13 KG/M2 | SYSTOLIC BLOOD PRESSURE: 122 MMHG | DIASTOLIC BLOOD PRESSURE: 88 MMHG

## 2021-03-30 DIAGNOSIS — S09.90XA HEAD TRAUMA, INITIAL ENCOUNTER: ICD-10-CM

## 2021-03-30 DIAGNOSIS — M54.2 CERVICALGIA: ICD-10-CM

## 2021-03-30 DIAGNOSIS — S06.0X1A CONCUSSION WITH LOSS OF CONSCIOUSNESS OF 30 MINUTES OR LESS, INITIAL ENCOUNTER: ICD-10-CM

## 2021-03-30 DIAGNOSIS — G44.319 ACUTE POST-TRAUMATIC HEADACHE, NOT INTRACTABLE: ICD-10-CM

## 2021-03-30 DIAGNOSIS — S06.0X1A CONCUSSION WITH LOSS OF CONSCIOUSNESS OF 30 MINUTES OR LESS, INITIAL ENCOUNTER: Primary | ICD-10-CM

## 2021-03-30 DIAGNOSIS — F43.10 PTSD (POST-TRAUMATIC STRESS DISORDER): ICD-10-CM

## 2021-03-30 DIAGNOSIS — S06.0X1D CONCUSSION WITH LOSS OF CONSCIOUSNESS OF 30 MINUTES OR LESS, SUBSEQUENT ENCOUNTER: Primary | ICD-10-CM

## 2021-03-30 DIAGNOSIS — F17.210 CIGARETTE SMOKER: ICD-10-CM

## 2021-03-30 PROCEDURE — 72052 X-RAY EXAM NECK SPINE 6/>VWS: CPT

## 2021-03-30 PROCEDURE — 97140 MANUAL THERAPY 1/> REGIONS: CPT | Performed by: PHYSICAL THERAPIST

## 2021-03-30 PROCEDURE — 97112 NEUROMUSCULAR REEDUCATION: CPT | Performed by: PHYSICAL THERAPIST

## 2021-03-30 PROCEDURE — OUTSIDEPOS PR OUTSIDE POS PLACEHOLDER: Performed by: NURSE PRACTITIONER

## 2021-03-30 PROCEDURE — 99213 OFFICE O/P EST LOW 20 MIN: CPT | Performed by: NURSE PRACTITIONER

## 2021-03-30 NOTE — PATIENT INSTRUCTIONS
"https://www.nhlbi.nih.gov/files/docs/public/heart/dash_brief.pdf\">   DASH Eating Plan  DASH stands for Dietary Approaches to Stop Hypertension. The DASH eating plan is a healthy eating plan that has been shown to:  · Reduce high blood pressure (hypertension).  · Reduce your risk for type 2 diabetes, heart disease, and stroke.  · Help with weight loss.  What are tips for following this plan?  Reading food labels  · Check food labels for the amount of salt (sodium) per serving. Choose foods with less than 5 percent of the Daily Value of sodium. Generally, foods with less than 300 milligrams (mg) of sodium per serving fit into this eating plan.  · To find whole grains, look for the word \"whole\" as the first word in the ingredient list.  Shopping  · Buy products labeled as \"low-sodium\" or \"no salt added.\"  · Buy fresh foods. Avoid canned foods and pre-made or frozen meals.  Cooking  · Avoid adding salt when cooking. Use salt-free seasonings or herbs instead of table salt or sea salt. Check with your health care provider or pharmacist before using salt substitutes.  · Do not snider foods. Cook foods using healthy methods such as baking, boiling, grilling, roasting, and broiling instead.  · Cook with heart-healthy oils, such as olive, canola, avocado, soybean, or sunflower oil.  Meal planning    · Eat a balanced diet that includes:  ? 4 or more servings of fruits and 4 or more servings of vegetables each day. Try to fill one-half of your plate with fruits and vegetables.  ? 6-8 servings of whole grains each day.  ? Less than 6 oz (170 g) of lean meat, poultry, or fish each day. A 3-oz (85-g) serving of meat is about the same size as a deck of cards. One egg equals 1 oz (28 g).  ? 2-3 servings of low-fat dairy each day. One serving is 1 cup (237 mL).  ? 1 serving of nuts, seeds, or beans 5 times each week.  ? 2-3 servings of heart-healthy fats. Healthy fats called omega-3 fatty acids are found in foods such as walnuts, " flaxseeds, fortified milks, and eggs. These fats are also found in cold-water fish, such as sardines, salmon, and mackerel.  · Limit how much you eat of:  ? Canned or prepackaged foods.  ? Food that is high in trans fat, such as some fried foods.  ? Food that is high in saturated fat, such as fatty meat.  ? Desserts and other sweets, sugary drinks, and other foods with added sugar.  ? Full-fat dairy products.  · Do not salt foods before eating.  · Do not eat more than 4 egg yolks a week.  · Try to eat at least 2 vegetarian meals a week.  · Eat more home-cooked food and less restaurant, buffet, and fast food.  Lifestyle  · When eating at a restaurant, ask that your food be prepared with less salt or no salt, if possible.  · If you drink alcohol:  ? Limit how much you use to:  § 0-1 drink a day for women who are not pregnant.  § 0-2 drinks a day for men.  ? Be aware of how much alcohol is in your drink. In the [place] one drink equals one 12 oz bottle of beer (355 mL), one 5 oz glass of wine (148 mL), or one 1½ oz glass of hard liquor (44 mL).  General information  · Avoid eating more than 2,300 mg of salt a day. If you have hypertension, you may need to reduce your sodium intake to 1,500 mg a day.  · Work with your health care provider to maintain a healthy body weight or to lose weight. Ask what an ideal weight is for you.  · Get at least 30 minutes of exercise that causes your heart to beat faster (aerobic exercise) most days of the week. Activities may include walking, swimming, or biking.  · Work with your health care provider or dietitian to adjust your eating plan to your individual calorie needs.  What foods should I eat?  Fruits  All fresh, dried, or frozen fruit. Canned fruit in natural juice (without added sugar).  Vegetables  Fresh or frozen vegetables (raw, steamed, roasted, or grilled). Low-sodium or reduced-sodium tomato and vegetable juice. Low-sodium or reduced-sodium tomato sauce and tomato paste.  Low-sodium or reduced-sodium canned vegetables.  Grains  Whole-grain or whole-wheat bread. Whole-grain or whole-wheat pasta. Brown rice. Oatmeal. Quinoa. Bulgur. Whole-grain and low-sodium cereals. Sandra bread. Low-fat, low-sodium crackers. Whole-wheat flour tortillas.  Meats and other proteins  Skinless chicken or turkey. Ground chicken or turkey. Pork with fat trimmed off. Fish and seafood. Egg whites. Dried beans, peas, or lentils. Unsalted nuts, nut butters, and seeds. Unsalted canned beans. Lean cuts of beef with fat trimmed off. Low-sodium, lean precooked or cured meat, such as sausages or meat loaves.  Dairy  Low-fat (1%) or fat-free (skim) milk. Reduced-fat, low-fat, or fat-free cheeses. Nonfat, low-sodium ricotta or cottage cheese. Low-fat or nonfat yogurt. Low-fat, low-sodium cheese.  Fats and oils  Soft margarine without trans fats. Vegetable oil. Reduced-fat, low-fat, or light mayonnaise and salad dressings (reduced-sodium). Canola, safflower, olive, avocado, soybean, and sunflower oils. Avocado.  Seasonings and condiments  Herbs. Spices. Seasoning mixes without salt.  Other foods  Unsalted popcorn and pretzels. Fat-free sweets.  The items listed above may not be a complete list of foods and beverages you can eat. Contact a dietitian for more information.  What foods should I avoid?  Fruits  Canned fruit in a light or heavy syrup. Fried fruit. Fruit in cream or butter sauce.  Vegetables  Creamed or fried vegetables. Vegetables in a cheese sauce. Regular canned vegetables (not low-sodium or reduced-sodium). Regular canned tomato sauce and paste (not low-sodium or reduced-sodium). Regular tomato and vegetable juice (not low-sodium or reduced-sodium). Pickles. Olives.  Grains  Baked goods made with fat, such as croissants, muffins, or some breads. Dry pasta or rice meal packs.  Meats and other proteins  Fatty cuts of meat. Ribs. Fried meat. Shannon. Bologna, salami, and other precooked or cured meats, such as  sausages or meat loaves. Fat from the back of a pig (fatback). Bratwurst. Salted nuts and seeds. Canned beans with added salt. Canned or smoked fish. Whole eggs or egg yolks. Chicken or turkey with skin.  Dairy  Whole or 2% milk, cream, and half-and-half. Whole or full-fat cream cheese. Whole-fat or sweetened yogurt. Full-fat cheese. Nondairy creamers. Whipped toppings. Processed cheese and cheese spreads.  Fats and oils  Butter. Stick margarine. Lard. Shortening. Ghee. Shannon fat. Tropical oils, such as coconut, palm kernel, or palm oil.  Seasonings and condiments  Onion salt, garlic salt, seasoned salt, table salt, and sea salt. Worcestershire sauce. Tartar sauce. Barbecue sauce. Teriyaki sauce. Soy sauce, including reduced-sodium. Steak sauce. Canned and packaged gravies. Fish sauce. Oyster sauce. Cocktail sauce. Store-bought horseradish. Ketchup. Mustard. Meat flavorings and tenderizers. Bouillon cubes. Hot sauces. Pre-made or packaged marinades. Pre-made or packaged taco seasonings. Relishes. Regular salad dressings.  Other foods  Salted popcorn and pretzels.  The items listed above may not be a complete list of foods and beverages you should avoid. Contact a dietitian for more information.  Where to find more information  · National Heart, Lung, and Blood [place]: www.nhlbi.nih.gov  · American Heart Association: www.heart.org  · Academy of Nutrition and Dietetics: www.eatright.org  · National Kidney Foundation: www.kidney.org  Summary  · The DASH eating plan is a healthy eating plan that has been shown to reduce high blood pressure (hypertension). It may also reduce your risk for type 2 diabetes, heart disease, and stroke.  · When on the DASH eating plan, aim to eat more fresh fruits and vegetables, whole grains, lean proteins, low-fat dairy, and heart-healthy fats.  · With the DASH eating plan, you should limit salt (sodium) intake to 2,300 mg a day. If you have hypertension, you may need to reduce your  sodium intake to 1,500 mg a day.  · Work with your health care provider or dietitian to adjust your eating plan to your individual calorie needs.  This information is not intended to replace advice given to you by your health care provider. Make sure you discuss any questions you have with your health care provider.  Document Revised: 11/20/2020 Document Reviewed: 11/20/2020  [employer] Patient Education © 2021 [employer]      BMI for Adults  What is BMI?  Body mass index (BMI) is a number that is calculated from a person's weight and height. BMI can help estimate how much of a person's weight is composed of fat. BMI does not measure body fat directly. Rather, it is an alternative to procedures that directly measure body fat, which can be difficult and expensive.  BMI can help identify people who may be at higher risk for certain medical problems.  What are BMI measurements used for?  BMI is used as a screening tool to identify possible weight problems. It helps determine whether a person is obese, overweight, a healthy weight, or underweight.  BMI is useful for:  · Identifying a weight problem that may be related to a medical condition or may increase the risk for medical problems.  · Promoting changes, such as changes in diet and exercise, to help reach a healthy weight. BMI screening can be repeated to see if these changes are working.  How is BMI calculated?  BMI involves measuring your weight in relation to your height. Both height and weight are measured, and the BMI is calculated from those numbers. This can be done either in English ([place] or metric measurements. Note that charts and online BMI calculators are available to help you find your BMI quickly and easily without having to do these calculations yourself.  To calculate your BMI in English ([place] measurements:    1. Measure your weight in pounds (lb).  2. Multiply the number of pounds by 703.  ? For example, for a person who weighs 180 lb, multiply  "that number by 703, which equals 126,540.  3. Measure your height in inches. Then multiply that number by itself to get a measurement called \"inches squared.\"  ? For example, for a person who is 70 inches tall, the \"inches squared\" measurement is 70 inches x 70 inches, which equals 4,900 inches squared.  4. Divide the total from step 2 (number of lb x 703) by the total from step 3 (inches squared): 126,540 ÷ 4,900 = 25.8. This is your BMI.  To calculate your BMI in metric measurements:  1. Measure your weight in kilograms (kg).  2. Measure your height in meters (m). Then multiply that number by itself to get a measurement called \"meters squared.\"  ? For example, for a person who is 1.75 m tall, the \"meters squared\" measurement is 1.75 m x 1.75 m, which is equal to 3.1 meters squared.  3. Divide the number of kilograms (your weight) by the meters squared number. In this example: 70 ÷ 3.1 = 22.6. This is your BMI.  What do the results mean?  BMI charts are used to identify whether you are underweight, normal weight, overweight, or obese. The following guidelines will be used:  · Underweight: BMI less than 18.5.  · Normal weight: BMI between 18.5 and 24.9.  · Overweight: BMI between 25 and 29.9.  · Obese: BMI of 30 or above.  Keep these notes in mind:  · Weight includes both fat and muscle, so someone with a muscular build, such as an athlete, may have a BMI that is higher than 24.9. In cases like these, BMI is not an accurate measure of body fat.  · To determine if excess body fat is the cause of a BMI of 25 or higher, further assessments may need to be done by a health care provider.  · BMI is usually interpreted in the same way for men and women.  Where to find more information  For more information about BMI, including tools to quickly calculate your BMI, go to these websites:  · Centers for Disease Control and Prevention: www.cdc.gov  · American Heart Association: www.heart.org  · National Heart, Lung, and Blood " [place]: www.nhlbi.nih.gov  Summary  · Body mass index (BMI) is a number that is calculated from a person's weight and height.  · BMI may help estimate how much of a person's weight is composed of fat. BMI can help identify those who may be at higher risk for certain medical problems.  · BMI can be measured using English measurements or metric measurements.  · BMI charts are used to identify whether you are underweight, normal weight, overweight, or obese.  This information is not intended to replace advice given to you by your health care provider. Make sure you discuss any questions you have with your health care provider.  Document Revised: 09/09/2020 Document Reviewed: 07/17/2020  [employer] Patient Education © 2021 [employer]      For more information:    Quit Now [place]  1-800-QUIT-NOW  https://[facility] [place].quitlogix.org/en-[place]/  Steps to Quit Smoking  Smoking tobacco can be harmful to your health and can affect almost every organ in your body. Smoking puts you, and those around you, at risk for developing many serious chronic diseases. Quitting smoking is difficult, but it is one of the best things that you can do for your health. It is never too late to quit.  What are the benefits of quitting smoking?  When you quit smoking, you lower your risk of developing serious diseases and conditions, such as:  · Lung cancer or lung disease, such as COPD.  · Heart disease.  · Stroke.  · Heart attack.  · Infertility.  · Osteoporosis and bone fractures.  Additionally, symptoms such as coughing, wheezing, and shortness of breath may get better when you quit. You may also find that you get sick less often because your body is stronger at fighting off colds and infections. If you are pregnant, quitting smoking can help to reduce your chances of having a baby of low birth weight.  How do I get ready to quit?  When you decide to quit smoking, create a plan to make sure that you are successful. Before you quit:  · Pick a date  to quit. Set a date within the next two weeks to give you time to prepare.  · Write down the reasons why you are quitting. Keep this list in places where you will see it often, such as on your bathroom mirror or in your car or wallet.  · Identify the people, places, things, and activities that make you want to smoke (triggers) and avoid them. Make sure to take these actions:  ¨ Throw away all cigarettes at home, at work, and in your car.  ¨ Throw away smoking accessories, such as ashtrays and lighters.  ¨ Clean your car and make sure to empty the ashtray.  ¨ Clean your home, including curtains and carpets.  · Tell your family, friends, and coworkers that you are quitting. Support from your loved ones can make quitting easier.  · Talk with your health care provider about your options for quitting smoking.  · Find out what treatment options are covered by your health insurance.  What strategies can I use to quit smoking?  Talk with your healthcare provider about different strategies to quit smoking. Some strategies include:  · Quitting smoking altogether instead of gradually lessening how much you smoke over a period of time. Research shows that quitting “cold turkey” is more successful than gradually quitting.  · Attending in-person counseling to help you build problem-solving skills. You are more likely to have success in quitting if you attend several counseling sessions. Even short sessions of 10 minutes can be effective.  · Finding resources and support systems that can help you to quit smoking and remain smoke-free after you quit. These resources are most helpful when you use them often. They can include:  ¨ Online chats with a counselor.  ¨ Telephone quitlines.  ¨ Printed self-help materials.  ¨ Support groups or group counseling.  ¨ Text messaging programs.  ¨ Mobile phone applications.  · Taking medicines to help you quit smoking. (If you are pregnant or breastfeeding, talk with your health care provider  first.) Some medicines contain nicotine and some do not. Both types of medicines help with cravings, but the medicines that include nicotine help to relieve withdrawal symptoms. Your health care provider may recommend:  ¨ Nicotine patches, gum, or lozenges.  ¨ Nicotine inhalers or sprays.  ¨ Non-nicotine medicine that is taken by mouth.  Talk with your health care provider about combining strategies, such as taking medicines while you are also receiving in-person counseling. Using these two strategies together makes you more likely to succeed in quitting than if you used either strategy on its own.  If you are pregnant or breastfeeding, talk with your health care provider about finding counseling or other support strategies to quit smoking. Do not take medicine to help you quit smoking unless told to do so by your health care provider.  What things can I do to make it easier to quit?  Quitting smoking might feel overwhelming at first, but there is a lot that you can do to make it easier. Take these important actions:  · Reach out to your family and friends and ask that they support and encourage you during this time. Call telephone quitlines, reach out to support groups, or work with a counselor for support.  · Ask people who smoke to avoid smoking around you.  · Avoid places that trigger you to smoke, such as bars, parties, or smoke-break areas at work.  · Spend time around people who do not smoke.  · Lessen stress in your life, because stress can be a smoking trigger for some people. To lessen stress, try:  ¨ Exercising regularly.  ¨ Deep-breathing exercises.  ¨ Yoga.  ¨ Meditating.  ¨ Performing a body scan. This involves closing your eyes, scanning your body from head to toe, and noticing which parts of your body are particularly tense. Purposefully relax the muscles in those areas.  · Download or purchase mobile phone or tablet apps (applications) that can help you stick to your quit plan by providing  reminders, tips, and encouragement. There are many free apps, such as QuitGuide from the CDC (Centers for Disease Control and Prevention). You can find other support for quitting smoking (smoking cessation) through smokefree.gov and other websites.  How will I feel when I quit smoking?  Within the first 24 hours of quitting smoking, you may start to feel some withdrawal symptoms. These symptoms are usually most noticeable 2-3 days after quitting, but they usually do not last beyond 2-3 weeks. Changes or symptoms that you might experience include:  · Mood swings.  · Restlessness, anxiety, or irritation.  · Difficulty concentrating.  · Dizziness.  · Strong cravings for sugary foods in addition to nicotine.  · Mild weight gain.  · Constipation.  · Nausea.  · Coughing or a sore throat.  · Changes in how your medicines work in your body.  · A depressed mood.  · Difficulty sleeping (insomnia).  After the first 2-3 weeks of quitting, you may start to notice more positive results, such as:  · Improved sense of smell and taste.  · Decreased coughing and sore throat.  · Slower heart rate.  · Lower blood pressure.  · Clearer skin.  · The ability to breathe more easily.  · Fewer sick days.  Quitting smoking is very challenging for most people. Do not get discouraged if you are not successful the first time. Some people need to make many attempts to quit before they achieve long-term success. Do your best to stick to your quit plan, and talk with your health care provider if you have any questions or concerns.  This information is not intended to replace advice given to you by your health care provider. Make sure you discuss any questions you have with your health care provider.  Document Released: 12/12/2002 Document Revised: 08/15/2017 Document Reviewed: 05/03/2016  [employer] Patient Education © 2017 [employer]

## 2021-03-30 NOTE — PROGRESS NOTES
"    Chief complaint:   Chief Complaint   Patient presents with   • Concussion     London is returning after imaging of brain and cervical for follow up for a concussion and for neck pain.         Subjective     HPI: This is a 35-year-old male gentleman who been following for a work-related accident where he sustained a head injury.  He has been in physical therapy in regards to his concussion for headaches and neck pain.  Did feel like the physical therapy was helping but was still dealing with headaches on a daily basis.  He was taking Fioricet to help with his pain issues.  He was not complaining of any arm pain.  The patient continues to complain of daily neck pain and headaches.  He does flex the physical therapy has helped improve his balance.  He does go twice a week.  He says the headaches are daily.  He does notice that the headaches are worse whenever he has issues with his neck.  The pain in his neck is constant.  It is worse when he is looking down and better whenever he is posture is improved and looking straight ahead.  Denies any bowel or bladder incontinence.  He continues to work with physical therapy.  He has not done any recent chiropractic care pain management injections.  Rates his pain on a scale 0-10 at a 7.  He says it does interfere with his actives of daily living.  He still has not been able to go back to work as of yet.    Review of Systems   Musculoskeletal: Positive for myalgias and neck pain.   Neurological: Positive for dizziness and headaches. Negative for weakness.         Objective      Vital Signs  /88   Ht 175.3 cm (69\")   Wt 80 kg (176 lb 6.4 oz)   BMI 26.05 kg/m²     Physical Exam  Constitutional:       Appearance: He is well-developed.   HENT:      Head: Normocephalic.   Eyes:      Extraocular Movements: EOM normal.      Pupils: Pupils are equal, round, and reactive to light.   Pulmonary:      Effort: Pulmonary effort is normal.   Musculoskeletal:         General: " Normal range of motion.      Cervical back: Normal range of motion.   Skin:     General: Skin is warm.   Neurological:      Mental Status: He is alert and oriented to person, place, and time.      GCS: GCS eye subscore is 4. GCS verbal subscore is 5. GCS motor subscore is 6.      Cranial Nerves: No cranial nerve deficit.      Sensory: No sensory deficit.      Gait: Gait is intact. Gait normal.      Deep Tendon Reflexes: Strength normal and reflexes are normal and symmetric.   Psychiatric:         Speech: Speech normal.         Behavior: Behavior normal.         Thought Content: Thought content normal.         Neurologic Exam     Mental Status   Oriented to person, place, and time.   Attention: normal. Concentration: normal.   Speech: speech is normal   Level of consciousness: alert  Normal comprehension.     Cranial Nerves     CN II   Visual fields full to confrontation.     CN III, IV, VI   Pupils are equal, round, and reactive to light.  Extraocular motions are normal.     CN V   Facial sensation intact.     CN VII   Facial expression full, symmetric.     CN VIII   CN VIII normal.     CN IX, X   CN IX normal.   CN X normal.     CN XI   CN XI normal.     CN XII   CN XII normal.     Motor Exam   Muscle bulk: normal    Strength   Strength 5/5 throughout.     Sensory Exam   Light touch normal.     Gait, Coordination, and Reflexes     Gait  Gait: normal    Reflexes   Reflexes 2+ except as noted.       Results Review: MRI of the brain that was done here at [facility] on March 11, 2021 was normal    MRI of the cervical spine done here at [facility] on March 11, 2021 shows patient does have a disc bulging causing bilateral foraminal narrowing at C6-7.  No cord signal change.  No malalignment.  No fracture.  Due to the mechanism of the patient's injury it is possible that the disc bulging may have presented from his accident        Assessment/Plan: I did discuss this patient with Dr. Orona.  At  this point is not felt that anything from a surgical standpoint needs to be addressed.  The patient will need to go have an evaluation from neurology in regards to his headaches.  I will also make referral to pain management for the patient's neck pain.  I would recommend the patient stay off work until he is evaluated by neurology.  We will need to see him on an as-needed basis.  They were told to call us with any further problems or concerns    Patient is a smoker. Smoking cessation classes given to the patient  The patient's Body mass index is 26.05 kg/m².. BMI is above normal parameters. Recommendations include: educational material and nutrition counseling    Diagnoses and all orders for this visit:    1. Concussion with loss of consciousness of 30 minutes or less, subsequent encounter (Primary)  -     Ambulatory Referral to Neurology    2. Cervicalgia  -     Ambulatory Referral to Pain Management    3. Cigarette smoker    4. BMI 26.0-26.9,adult        I discussed the patients findings and my recommendations with patient  Jose Trujillo, APRN  03/30/21  12:18 CDT

## 2021-03-30 NOTE — PROGRESS NOTES
Physical Therapy Treatment Note    Patient: London Calabrese                                                                                     Visit Date: 3/30/2021  :     1985    Referring practitioner:    Anthony Orona MD  Date of Initial Visit:          Type: THERAPY  Noted: 2020    Patient seen for 32 sessions    Visit Diagnoses:    ICD-10-CM ICD-9-CM   1. Concussion with loss of consciousness of 30 minutes or less, initial encounter  S06.0X1A 850.11   2. Cervicalgia  M54.2 723.1   3. Head trauma, initial encounter  S09.90XA 959.01     SUBJECTIVE     Subjective  Patient stated that headaches are continuing to get worse. For about 1.5 weeks, the headache has been a shooting pain directly over his L eye.     PAIN: pre 6/10         OBJECTIVE     Objective      Neuromuscular Reeducation     43988 Comments   Throwing red/white/blue ball  10-15 throws; CGA-Min A   Tandem standing throwing red/white/blue ball  10 throws each foot posterior    Throwing multicolored ball 10-15 throws   Simultaneous throwing/catching colored balls working on tracking and coordination  10-15 throws    Timed Minutes 30     Manual Therapy 48588  Comments   Cervical suboccipital release, manual traction HL                           Timed Minutes  10         Therapy Education/Self Care 62158   Details:    Medbridge Code:    Given postural retraining   Progress: New   Who provided to: Patient   Level of understanding Verbalized   Timed Minutes      Total Timed Treatment:     40   mins  Total Time of Visit:             40   mins         ASSESSMENT/PLAN     GOALS       Goals                                          Progress Note due by 21   STG by: 21 Comments Status   Pt will demonstrate 50 degrees or greater (B) cervical rotaiton   Onging                           LTG by: 2/3/21       Pt will demonstrate 65 degrees or greater (B) cervical rotaiton   Ongoing   Pt will score 10 or less on the NDI   Ongoing   Pt will be  indpendent with HEP to include postural strengthening and vestibular exercises   Ongoing   Pt will report no dizziness with bending over for one week Increased dizziness with multicolored ball throwing/catching, however patient improved with practice  Ongoing   Pt will demonstrate SLS for 30 seconds or greater             Assessment/Plan     ASSESSMENT:  Patient had constant headache throughout session, however intensity of headache did not increase with activity. He demonstrated difficulty with tracking and coordination multicolored balls when throwing/catching. Responded well to manual therapy, reported slight decrease in headache. Patient had an appointment w MD following physical therapy session, will follow up with him next time.     PLAN: Continue to work on dynamic balance, challenging visual tracking and coordination. Consider working in quadruped position to improve positioning tolerance.     Signature: Lacy Weldon PT, Student    Session was supervised and documentation was reviewed by Juanjose Turcios PT, DPT, ATC.    Signature: Juanjose Turcios PT, DPT, ATC

## 2021-04-01 ENCOUNTER — TREATMENT (OUTPATIENT)
Dept: PHYSICAL THERAPY | Facility: CLINIC | Age: 36
End: 2021-04-01

## 2021-04-01 DIAGNOSIS — S09.90XA HEAD TRAUMA, INITIAL ENCOUNTER: ICD-10-CM

## 2021-04-01 DIAGNOSIS — S06.0X1A CONCUSSION WITH LOSS OF CONSCIOUSNESS OF 30 MINUTES OR LESS, INITIAL ENCOUNTER: Primary | ICD-10-CM

## 2021-04-01 DIAGNOSIS — M54.2 CERVICALGIA: ICD-10-CM

## 2021-04-01 PROCEDURE — 97112 NEUROMUSCULAR REEDUCATION: CPT | Performed by: PHYSICAL THERAPIST

## 2021-04-01 PROCEDURE — 97140 MANUAL THERAPY 1/> REGIONS: CPT | Performed by: PHYSICAL THERAPIST

## 2021-04-01 NOTE — PROGRESS NOTES
Physical Therapy Treatment Note    Patient: London Calabrese                                                                                     Visit Date: 2021  :     1985    Referring practitioner:    Anthony Orona MD  Date of Initial Visit:          Type: THERAPY  Noted: 2020    Patient seen for 33 sessions    Visit Diagnoses:    ICD-10-CM ICD-9-CM   1. Concussion with loss of consciousness of 30 minutes or less, initial encounter  S06.0X1A 850.11   2. Cervicalgia  M54.2 723.1   3. Head trauma, initial encounter  S09.90XA 959.01     SUBJECTIVE     Subjective   Patient had follow up with doctor following last session. Stated that he suggest pain management injections. Patient still has headache directly over L eye.     PAIN: 5.5/10         OBJECTIVE     Objective      Neuromuscular Reeducation     81575 Comments   Throwing multicolored ball  10-15 throws; CGA   Throwing black and white ball   10-15 throws; CGA   Throwing black, yellow and green ball 10-15 throws   Quadruped neck in neutral position w eyes looking at ground 2 x 5 sec hold    Quadruped alt UE and then alt LE; eyes forward  2 x 5 each; difficulty maintaining midline balance when alt LE    Timed Minutes 30     Manual Therapy 54887  Comments   Cervical suboccipital release, manual traction w lateral bending  HL                           Timed Minutes  10         Therapy Education/Self Care 92002   Details:    Medbridge Code:    Given postural retraining   Progress: New   Who provided to: Patient   Level of understanding Verbalized   Timed Minutes      Total Timed Treatment:     40   mins  Total Time of Visit:             40   mins         ASSESSMENT/PLAN     GOALS       Goals                                          Progress Note due by 21   STG by: 21 Comments Status   Pt will demonstrate 50 degrees or greater (B) cervical rotaiton   Onging                           LTG by: 2/3/21       Pt will demonstrate 65 degrees or  greater (B) cervical rotaiton   Ongoing   Pt will score 10 or less on the NDI   Ongoing   Pt will be indpendent with HEP to include postural strengthening and vestibular exercises   Ongoing   Pt will report no dizziness with bending over for one week Patient tolerated 5 seconds of quadruped position with head down  Ongoing   Pt will demonstrate SLS for 30 seconds or greater             Assessment/Plan     ASSESSMENT:  Patient tolerated treatment well today, even with progressing coordination and balance activity. His ability to weight shift and visual track the ball while throwing/catching has improved, he only requires CGA. His tolerance to quadruped has improved while eyes are focused forward, however his dizziness and pressure in head increased when his neck is in neutral w eyes looking at ground. Educated him that he will need to continue working on that position to increase tolerance.     PLAN: Continue to progress balance activity with challenging coordination. Consider quadruped positioning to improve tolerance.     Signature: Lacy Weldon PT, Student    Session was supervised and documentation was reviewed by Juanjose Turcios PT, DPT, ATC.    Signature: Juanjose Turcios PT, DPT, ATC

## 2021-04-06 ENCOUNTER — TREATMENT (OUTPATIENT)
Dept: PHYSICAL THERAPY | Facility: CLINIC | Age: 36
End: 2021-04-06

## 2021-04-06 DIAGNOSIS — S09.90XA HEAD TRAUMA, INITIAL ENCOUNTER: ICD-10-CM

## 2021-04-06 DIAGNOSIS — S06.0X1A CONCUSSION WITH LOSS OF CONSCIOUSNESS OF 30 MINUTES OR LESS, INITIAL ENCOUNTER: Primary | ICD-10-CM

## 2021-04-06 DIAGNOSIS — M54.2 CERVICALGIA: ICD-10-CM

## 2021-04-06 PROCEDURE — 97140 MANUAL THERAPY 1/> REGIONS: CPT | Performed by: PHYSICAL THERAPIST

## 2021-04-06 PROCEDURE — 97112 NEUROMUSCULAR REEDUCATION: CPT | Performed by: PHYSICAL THERAPIST

## 2021-04-06 NOTE — PROGRESS NOTES
"Physical Therapy Treatment Note    Patient: London Calabrese                                                                                     Visit Date: 2021  :     1985    Referring practitioner:    Anthony Orona MD  Date of Initial Visit:          Type: THERAPY  Noted: 2020    Patient seen for 34 sessions    Visit Diagnoses:    ICD-10-CM ICD-9-CM   1. Concussion with loss of consciousness of 30 minutes or less, initial encounter  S06.0X1A 850.11   2. Cervicalgia  M54.2 723.1   3. Head trauma, initial encounter  S09.90XA 959.01     SUBJECTIVE     Subjective He reports having a restless night last, he continues to have some nausea, dizziness, and HA pain. He feels he is getting better except for the \"head part.\"    PAIN: pre 5.5 10         OBJECTIVE     Objective      Neuromuscular Reeducation     91339 Comments   [place] moving to his L and bending over first with 45 cm SB progressing with soccer ball, mini basketball, rainbow ball, play school baseball.                    Timed Minutes 45     Manual Therapy 36863  Comments   Cervical suboccipital release, manual traction w lateral bending  HL                           Timed Minutes  15              Therapy Education/Self Care 13613   Details:    Medbridge Code:    Given symptom relief   Progress: Reinforced   Who provided to: Patient   Level of understanding Verbalized   Timed Minutes      Total Timed Treatment:     60   mins  Total Time of Visit:             60   mins         ASSESSMENT/PLAN     GOALS  Goals                                          Progress Note due by 21   STG by: 21 Comments Status   Pt will demonstrate 50 degrees or greater (B) cervical rotaiton   Onging                           LTG by: 2/3/21       Pt will demonstrate 65 degrees or greater (B) cervical rotaiton   Ongoing   Pt will score 10 or less on the NDI   Ongoing   Pt will be indpendent with HEP to include postural strengthening and vestibular exercises "   Ongoing   Pt will report no dizziness with bending over for one week Able to perform forward flexed bending/fielding x 4 x 10 but had some dizziness post  Ongoing   Pt will demonstrate SLS for 30 seconds or greater    Ongoing         Assessment/Plan     ASSESSMENT: As he became more proficient with moving towards his L and fielding I made it more challenging by making him move further at a quicker pace then progressed it with making each ball bounce and/or adding spin. When I added spin to the smaller rainbow ball his blink rate increased significantly. However, out of forty fielding attempts he was able to complete seventy five percent accurately but did have some increased dizziness by the conclusion. I also made sure to allow brief seated rest breaks between each set as well.    PLAN: Continue to use varied size and pattern balls for visual stimuli and consider having him stand on foam performing hand to hand with some of the busier patterns.    Signature: Eusebio Gonzalez, PTA

## 2021-04-08 ENCOUNTER — TREATMENT (OUTPATIENT)
Dept: PHYSICAL THERAPY | Facility: CLINIC | Age: 36
End: 2021-04-08

## 2021-04-08 DIAGNOSIS — S06.0X1A CONCUSSION WITH LOSS OF CONSCIOUSNESS OF 30 MINUTES OR LESS, INITIAL ENCOUNTER: Primary | ICD-10-CM

## 2021-04-08 DIAGNOSIS — M54.2 CERVICALGIA: ICD-10-CM

## 2021-04-08 DIAGNOSIS — S09.90XA HEAD TRAUMA, INITIAL ENCOUNTER: ICD-10-CM

## 2021-04-08 PROCEDURE — 97112 NEUROMUSCULAR REEDUCATION: CPT | Performed by: PHYSICAL THERAPIST

## 2021-04-08 PROCEDURE — 97110 THERAPEUTIC EXERCISES: CPT | Performed by: PHYSICAL THERAPIST

## 2021-04-08 NOTE — PROGRESS NOTES
Physical Therapy Treatment Note    Patient: London Calabrese                                                                                     Visit Date: 2021  :     1985    Referring practitioner:    Anthony Orona MD  Date of Initial Visit:          Type: THERAPY  Noted: 2020    Patient seen for 35 sessions    Visit Diagnoses:    ICD-10-CM ICD-9-CM   1. Concussion with loss of consciousness of 30 minutes or less, initial encounter  S06.0X1A 850.11   2. Cervicalgia  M54.2 723.1   3. Head trauma, initial encounter  S09.90XA 959.01     SUBJECTIVE     Subjective Patient continues to have sleepless nights due to HA. His head has had increased pain since previous session.     PAIN: pre  6/10       OBJECTIVE     Objective      Neuromuscular Reeducation     30283 Comments   Standing throwing/catching colored balls (one ball then transitioned to both)  CGA- Min A    Standing on foam throwing/catching colored balls (one ball then transitioned to both)  CGA- Min A    Standing throwing/catching yellow/back balls (one ball then transitioned to both)  CGA-Min A           Timed Minutes 35     Therapeutic Exercises    47300 Comments   Unicam bike seat level 6                     Timed Minutes 8        Therapy Education/Self Care 08295   Details:    Medbridge Code:    Given symptom relief   Progress: Reinforced   Who provided to: Patient   Level of understanding Verbalized   Timed Minutes      Total Timed Treatment:     42   mins  Total Time of Visit:             42   mins         ASSESSMENT/PLAN     GOALS  Goals                                          Progress Note due by 21   STG by: 21 Comments Status   Pt will demonstrate 50 degrees or greater (B) cervical rotaiton   Met                            LTG by: 2/3/21       Pt will demonstrate 65 degrees or greater (B) cervical rotaiton   R 37 deg  L 44 deg Ongoing   Pt will score 10 or less on the NDI   Ongoing   Pt will be indpendent with HEP to  include postural strengthening and vestibular exercises   Ongoing   Pt will report no dizziness with bending over for one week  Ongoing   Pt will demonstrate SLS for 30 seconds or greater    Ongoing         Assessment/Plan     ASSESSMENT: Did not perform manual therapy today to see how patient's pain increases/decreases without. Patient's performance to weight shift, catch ball and process busy patterns continues to improve. His balance was greatly challenged today and although he had increased dizziness symptoms, his balance and coordination improved.      PLAN: Consider tall kneeling while throwing/catching busy patterned balls to promote reaching out of MANSOOR and change in head position.    Signature: Lacy Weldon, PT Student     Session was supervised and documentation was reviewed by Juanjose Turcios PT, DPT, ATC.    Signature: Juanjose Turcios PT, DPT, ATC

## 2021-04-09 ENCOUNTER — TELEPHONE (OUTPATIENT)
Dept: NEUROSURGERY | Age: 36
End: 2021-04-09

## 2021-04-09 NOTE — TELEPHONE ENCOUNTER
Jorge Patterson, Workman's Comp  called and left a voicemail on 04/08/2021 when I was out of the office stating that patient has an appointment with Loren Mederos on 04/21/2021 and wanted to know if she could come with the patient to the office visit and to call her back at [phone].  04/09/2021 at 10:40am called Ede Burlesno back and stated to Ede Burleson that currently our visitor restrictions have not changed and no visitors unless medical necessary. Ede Burleson voiced understanding and states that she has gotten restrictions from patients  now that states she cannot talk to the provider with the patient present so would not be able to come to the visit now anyway. Stated that the patient could call her on face time or have on speaker phone. Ede Burleson states that since she cannot speak to the provider with the patient present that she will have to call and follow up after the visit and talk to the provider. Stated to Ede Burleson that our providers will not speak to anyone on the phone and if she needs updates will have to request medical records. Ede Burleson voiced understanding and thanked me for the return call.  sh

## 2021-04-13 ENCOUNTER — TREATMENT (OUTPATIENT)
Dept: PHYSICAL THERAPY | Facility: CLINIC | Age: 36
End: 2021-04-13

## 2021-04-13 DIAGNOSIS — M54.2 CERVICALGIA: ICD-10-CM

## 2021-04-13 DIAGNOSIS — S09.90XA HEAD TRAUMA, INITIAL ENCOUNTER: ICD-10-CM

## 2021-04-13 DIAGNOSIS — S06.0X1A CONCUSSION WITH LOSS OF CONSCIOUSNESS OF 30 MINUTES OR LESS, INITIAL ENCOUNTER: Primary | ICD-10-CM

## 2021-04-13 PROCEDURE — 97110 THERAPEUTIC EXERCISES: CPT | Performed by: PHYSICAL THERAPIST

## 2021-04-13 PROCEDURE — 97112 NEUROMUSCULAR REEDUCATION: CPT | Performed by: PHYSICAL THERAPIST

## 2021-04-13 NOTE — PROGRESS NOTES
Physical Therapy Treatment Note    Patient: London Calabrese                                                                                     Visit Date: 2021  :     1985    Referring practitioner:    Anthony Orona MD  Date of Initial Visit:          Type: THERAPY  Noted: 2020    Patient seen for 36 sessions    Visit Diagnoses:    ICD-10-CM ICD-9-CM   1. Concussion with loss of consciousness of 30 minutes or less, initial encounter  S06.0X1A 850.11   2. Cervicalgia  M54.2 723.1   3. Head trauma, initial encounter  S09.90XA 959.01     SUBJECTIVE     Subjective He reports he had a bad HA but was able to eventually fall asleep but then had a nightmare and when he woke up his HA was worse and he only got a few hours of sleep last night     PAIN: pre 7/10 post 6.5/10         OBJECTIVE     Objective      Therapeutic Exercises    13718 Comments   Unicam bike seat level 6                              Timed Minutes 8       Neuromuscular Reeducation     87028 Comments   Tall knealing on blue mat rolling catches and bouncing catches with 45 cm SB then same with tennis ball     Quad ped on blue mat rolling catches with rainbow ball     Standing tos and catch with             Timed Minutes 32     Therapy Education/Self Care 91666   Details:    Medbridge Code:    Given symptom relief   Progress: Reinforced   Who provided to: Patient   Level of understanding Verbalized   Timed Minutes      Total Timed Treatment:     45   mins  Total Time of Visit:             45  mins         ASSESSMENT/PLAN     GOALS  Goals                                          Progress Note due by 21   STG by: 21 Comments Status   Pt will demonstrate 50 degrees or greater (B) cervical rotaiton   Met                            LTG by: 2/3/21       Pt will demonstrate 65 degrees or greater (B) cervical rotaiton  Ongoing   Pt will score 10 or less on the NDI   Ongoing   Pt will be indpendent with HEP to include postural  strengthening and vestibular exercises   Ongoing   Pt will report no dizziness with bending over for one week  addressed with quad ped activities today Ongoing   Pt will demonstrate SLS for 30 seconds or greater    Ongoing         Assessment/Plan     ASSESSMENT: He will be having injections soon and expressed some concerns and apprehension. He is moving to his L better and fielding better but still having a high blink rate when doing so.     PLAN: Consider DN'ing during the next few visits and continue working with different visual stimuli.  Signature: Eusebio Gonzalez, PTA

## 2021-04-15 ENCOUNTER — TREATMENT (OUTPATIENT)
Dept: PHYSICAL THERAPY | Facility: CLINIC | Age: 36
End: 2021-04-15

## 2021-04-15 DIAGNOSIS — S06.0X1A CONCUSSION WITH LOSS OF CONSCIOUSNESS OF 30 MINUTES OR LESS, INITIAL ENCOUNTER: Primary | ICD-10-CM

## 2021-04-15 DIAGNOSIS — M54.2 CERVICALGIA: ICD-10-CM

## 2021-04-15 DIAGNOSIS — S09.90XA HEAD TRAUMA, INITIAL ENCOUNTER: ICD-10-CM

## 2021-04-15 PROCEDURE — 97110 THERAPEUTIC EXERCISES: CPT | Performed by: PHYSICAL THERAPIST

## 2021-04-15 PROCEDURE — 97112 NEUROMUSCULAR REEDUCATION: CPT | Performed by: PHYSICAL THERAPIST

## 2021-04-15 PROCEDURE — 97140 MANUAL THERAPY 1/> REGIONS: CPT | Performed by: PHYSICAL THERAPIST

## 2021-04-15 NOTE — PROGRESS NOTES
Physical Therapy Treatment Note    Patient: London Calabrese                                                                                     Visit Date: 4/15/2021  :     1985    Referring practitioner:    Anthony Orona MD  Date of Initial Visit:          Type: THERAPY  Noted: 2020    Patient seen for 37 sessions    Visit Diagnoses:    ICD-10-CM ICD-9-CM   1. Concussion with loss of consciousness of 30 minutes or less, initial encounter  S06.0X1A 850.11   2. Cervicalgia  M54.2 723.1   3. Head trauma, initial encounter  S09.90XA 959.01     SUBJECTIVE     Subjective He erports he continues to have HA's regularly and some nausea.    PAIN: pre 6.5/10 post 610         OBJECTIVE     Objective     Therapeutic Exercises    53136 Comments   Unicam bike seat level 6 res 2                             Timed Minutes 8     Neuromuscular Reeducation     72427 Comments   B sidestepping rebounding with balloon then handball x 40 ft    Simultaneous throw and catch with blue stress balls progressed with closed distances then same with rainbow ans space pattern balls    B side stepping rebounding with rainbow pattern ball x 40 ft.            Timed Minutes 37     Manual Therapy 02938  Comments   Cervical suboccipital release, manual traction w lateral bending  HL                           Timed Minutes  15       Therapy Education/Self Care 61248   Details:    Medbridge Code:    Given Home Exercise Program   Progress: Reinforced   Who provided to: Patient   Level of understanding Verbalized   Timed Minutes      Total Timed Treatment:     60   mins  Total Time of Visit:             60   mins         ASSESSMENT/PLAN     GOALS  Goals                                          Progress Note due by 21   STG by: 21 Comments Status   Pt will demonstrate 50 degrees or greater (B) cervical rotaiton   Met                            LTG by: 2/3/21       Pt will demonstrate 65 degrees or greater (B) cervical rotaiton    Ongoing   Pt will score 10 or less on the NDI   Ongoing   Pt will be indpendent with HEP to include postural strengthening and vestibular exercises  advised on how to replicate at home and patterns to use with rebounding today Ongoing   Pt will report no dizziness with bending over for one week   Ongoing   Pt will demonstrate SLS for 30 seconds or greater    Ongoing         Assessment/Plan     ASSESSMENT: He continues to improve and be motivated to do everything he can to fully recover as he is anxious to return to work and recreational activities. He improved with rebounding and moving to his L and was able to increase his arvind and required fewer breaks to return to a normal blink rate.    PLAN: Review all goals and perform a progress note as well as fill out a continued authorization form for continued POC.     Signature: Eusebio Gonzalez, PTA

## 2021-04-20 ENCOUNTER — TREATMENT (OUTPATIENT)
Dept: PHYSICAL THERAPY | Facility: CLINIC | Age: 36
End: 2021-04-20

## 2021-04-20 DIAGNOSIS — M54.2 CERVICALGIA: ICD-10-CM

## 2021-04-20 DIAGNOSIS — S06.0X1A CONCUSSION WITH LOSS OF CONSCIOUSNESS OF 30 MINUTES OR LESS, INITIAL ENCOUNTER: Primary | ICD-10-CM

## 2021-04-20 DIAGNOSIS — S09.90XA HEAD TRAUMA, INITIAL ENCOUNTER: ICD-10-CM

## 2021-04-20 PROCEDURE — 97530 THERAPEUTIC ACTIVITIES: CPT | Performed by: PHYSICAL THERAPIST

## 2021-04-20 PROCEDURE — 97112 NEUROMUSCULAR REEDUCATION: CPT | Performed by: PHYSICAL THERAPIST

## 2021-04-20 NOTE — PROGRESS NOTES
Progress Note Addendum      Patient: London Calabrese           : 1985  Visit Date: 2021  Referring practitioner: Anthony Orona MD  Date of Initial Visit: Type: THERAPY  Noted: 2020  Patient seen for 38 sessions  Visit Diagnoses:    ICD-10-CM ICD-9-CM   1. Concussion with loss of consciousness of 30 minutes or less, initial encounter  S06.0X1A 850.11   2. Cervicalgia  M54.2 723.1   3. Head trauma, initial encounter  S09.90XA 959.01       PT G-Codes  Outcome Measure Options: Neck Disability Index (NDI)  Neck Disability Index Score: 36  Clinical Progress: improved  Home Program Compliance: Yes  Treatment has included: therapeutic exercise, neuromuscular re-education, manual therapy, therapeutic activity and gait training  Progress toward previous goals: Partially Met  Prognosis to achieve goals: good    Subjective     Objective     Assessment & Plan     Assessment  Impairments: abnormal coordination, abnormal gait, activity intolerance and impaired balance  Prognosis: good  Functional Limitations: sleeping, walking, standing and stooping  Plan  Planned therapy interventions: manual therapy, balance/weight-bearing training, body mechanics training, neuromuscular re-education, motor coordination training, postural training, fine motor coordination training, soft tissue mobilization, spinal/joint mobilization, strengthening, functional ROM exercises, flexibility, gait training, stretching, therapeutic activities, home exercise program, IADL retraining and joint mobilization  Frequency: 2x week  Duration in visits: 8  Duration in weeks: 4  Treatment plan discussed with: patient        I have reviewed the progress note information provided by Eusebio Gonzalez PTA, and I concur with the findings.    Juanjose Turcios, PT DPT  Physical Therapist

## 2021-04-20 NOTE — PROGRESS NOTES
Physical Therapy 90 Day Recertification Note    Patient: London Calabrese                                                                                     Visit Date: 2021  :     1985    Referring practitioner:    Anthony Orona MD  Date of Initial Visit:          Type: THERAPY  Noted: 2020    Patient seen for 38 sessions    Visit Diagnoses:    ICD-10-CM ICD-9-CM   1. Concussion with loss of consciousness of 30 minutes or less, initial encounter  S06.0X1A 850.11   2. Cervicalgia  M54.2 723.1   3. Head trauma, initial encounter  S09.90XA 959.01     SUBJECTIVE     Subjective He reports since the last visit he continues to have HA pain that is not improving. He reports he is able to maintain his balance better and his concentration has improved with P.T. He feels as though he has made 60% improvement with P.T. since the first day.    PAIN: pre 10 post 7/10  PT G-Codes  Outcome Measure Options: Neck Disability Index (NDI)  Neck Disability Index Score: 36    OBJECTIVE     Objective     Therapeutic Activities    [id number] Comments   Reviewed all goals for recertification/progress note and continued authorization request (see goals section)    Obtained an updated NDI (see outcome measures section)    Completed an updated authorization survey ( see media section)            Timed Minutes 15     Neuromuscular Reeducation     [id number] Comments   Ball toss and catch in NS with different size and colored pattern footballs and arrow dart nerf ball progressing by reducing the distance and performing with different spins and varied targets    Progressive kick ball with soccer ball                Timed Minutes 25     Therapy Education/Self Care 41808   Details:    Medbridge Code:    Given Home Exercise Program   Progress: Reinforced   Who provided to: Patient   Level of understanding Verbalized and Demonstrated   Timed Minutes      Total Timed Treatment:     40   mins  Total Time of Visit:             40   mins          ASSESSMENT/PLAN     GOALS  Goals                                          Progress Note due by 5/19/21   STG by: 1/31/21 Comments Status   Pt will demonstrate 50 degrees or greater (B) cervical rotaiton   Met                            LTG by: 2/3/21       Pt will demonstrate 65 degrees or greater (B) cervical rotaiton  R rotation 50 degrees L 60 degrees AROM  Ongoing   Pt will score 10 or less on the NDI  36 today  Ongoing   Pt will be indpendent with HEP to include postural strengthening and vestibular exercises Reinforced today Ongoing   Pt will report no dizziness with bending over for one week  dizziness increased after 15 seconds and he had a loss of balance Ongoing   Pt will demonstrate SLS for 30 seconds or greater   L LE 10 sec R LE 8 sec Ongoing         Assessment/Plan     ASSESSMENT: His B SLS time has improved as compared to the initial which was five seconds. He has also had an improvement in his cervical L rotation and it is now sixty degrees. With visual targeting, tracking, and tracing he has difficulties going to his L and his blink rate also increases. He is motivated to progress and is eager to return to work and recreational activities but his HA and dizziness is a near constant issue. He fully participates eagerly in all intervention activities even though most of the activities are symptom provocative. He remarked today that any activities we have him perform where he is forward flexed (bent over) he feels like the blood rushes to his head from his feet up through his body. He is progressing but this is a challenging post concussive case. I'm certain some of his symptoms are related to intercranial pressure and residual concussive issues and wonder if he has had some undiagnosed and untreated head trauma from the past perhaps as far back as childhood. I also suspect he may benefit from a consultation with one of the [facility] universities that are conducting research studies on post concussion  syndrome.     PLAN: Continue visual tracking activities combined with static and dynamic balance challenges. Continue having him work intermittently in prone and quad-ped as well with varied visual stimuli and progress as much as he can tolerate.    Signature: Eusebio Gonzalez, PTA

## 2021-04-21 ENCOUNTER — OFFICE VISIT (OUTPATIENT)
Dept: NEUROSURGERY | Age: 36
End: 2021-04-21
Payer: COMMERCIAL

## 2021-04-21 VITALS
BODY MASS INDEX: 26.66 KG/M2 | WEIGHT: 180 LBS | HEIGHT: 69 IN | TEMPERATURE: 97.3 F | HEART RATE: 89 BPM | OXYGEN SATURATION: 97 % | DIASTOLIC BLOOD PRESSURE: 88 MMHG | SYSTOLIC BLOOD PRESSURE: 128 MMHG

## 2021-04-21 DIAGNOSIS — F07.81 POST CONCUSSION SYNDROME: ICD-10-CM

## 2021-04-21 DIAGNOSIS — M54.2 NECK PAIN: ICD-10-CM

## 2021-04-21 DIAGNOSIS — G43.009 MIGRAINE WITHOUT AURA AND WITHOUT STATUS MIGRAINOSUS, NOT INTRACTABLE: Primary | ICD-10-CM

## 2021-04-21 PROCEDURE — 99204 OFFICE O/P NEW MOD 45 MIN: CPT | Performed by: NURSE PRACTITIONER

## 2021-04-21 RX ORDER — DICLOFENAC SODIUM 75 MG/1
75 TABLET, DELAYED RELEASE ORAL 2 TIMES DAILY
COMMUNITY
Start: 2020-11-17 | End: 2021-04-21

## 2021-04-21 RX ORDER — TOPIRAMATE 50 MG/1
50 TABLET, FILM COATED ORAL 2 TIMES DAILY
Qty: 60 TABLET | Refills: 3 | Status: SHIPPED | OUTPATIENT
Start: 2021-04-21 | End: 2021-05-10

## 2021-04-21 RX ORDER — SUMATRIPTAN 50 MG/1
TABLET, FILM COATED ORAL
Qty: 9 TABLET | Refills: 3 | Status: SHIPPED | OUTPATIENT
Start: 2021-04-21 | End: 2022-04-29 | Stop reason: SDUPTHER

## 2021-04-21 NOTE — PROGRESS NOTES
DENTAL SURGERY         History reviewed. No pertinent family history. Social History     Socioeconomic History    Marital status: Single     Spouse name: Not on file    Number of children: Not on file    Years of education: Not on file    Highest education level: Not on file   Occupational History    Not on file   Social Needs    Financial resource strain: Not on file    Food insecurity     Worry: Not on file     Inability: Not on file    Transportation needs     Medical: Not on file     Non-medical: Not on file   Tobacco Use    Smoking status: Current Every Day Smoker     Packs/day: 0.50     Types: Cigarettes    Smokeless tobacco: Never Used   Substance and Sexual Activity    Alcohol use: Not Currently    Drug use: Not Currently    Sexual activity: Not on file   Lifestyle    Physical activity     Days per week: Not on file     Minutes per session: Not on file    Stress: Not on file   Relationships    Social connections     Talks on phone: Not on file     Gets together: Not on file     Attends Protestant service: Not on file     Active member of club or organization: Not on file     Attends meetings of clubs or organizations: Not on file     Relationship status: Not on file    Intimate partner violence     Fear of current or ex partner: Not on file     Emotionally abused: Not on file     Physically abused: Not on file     Forced sexual activity: Not on file   Other Topics Concern    Not on file   Social History Narrative    Not on file       Current Outpatient Medications   Medication Sig Dispense Refill    SUMAtriptan (IMITREX) 50 MG tablet Take 1 tablet at the onset of migraine. Can repeat once in 2 hours if no improvement. Do not exceed 2 tablets in 24 hours. 9 tablet 3    topiramate (TOPAMAX) 50 MG tablet Take 1 tablet by mouth 2 times daily 60 tablet 3     No current facility-administered medications for this visit. No Known Allergies    REVIEW OF SYSTEMS  Constitutional: []? Fever []?Sweats []? Chills []? Recent Injury [x]? Denies all unless marked  HEENT:[x]? Headache  [x]? Head Injury []? Hearing Loss  []? Sore Throat  []? Ear Ache []? Denies all unless marked  Spine:  [x]? Neck pain  [x]? Back pain  []? Celestia Loach  []? Denies all unless marked  Cardiovascular:[]? Heart Disease []? Palpitations []? Chest Pain   [x]? Denies all unless marked  Pulmonary: []? Shortness of Breath []? Cough   [x]? Denies all unless marked  Psychiatric/Behavioral:[]? Depression []? Anxiety [x]? Denies all unless marked  Gastrointestinal: [x]? Nausea  []? Vomiting  []? Abdominal Pain  []? Constipation  []? Diarrhea  []? Denies all unless marked  Genitourinary:   [x]? Frequency  []? Urgency  []? Dysuria []? Incontinence  []? Denies all unless marked  Extremities: []? Pain  []? Swelling  [x]? Denies all unless marked  Musculoskeletal: [x]? Myalgias  [x]? Joint Pain  []? Arthritis []? Muscle Cramps []? Muscle Twitches  []? Denies all unless marked  Sleep: [x]? Insomnia[]? Snoring []? Restless Legs  []? Sleep Apnea  []? Daytime Sleepiness  []? Denies all unless marked  Skin:[]? Rash []? Color Change [x]? Denies all unless marked   Neurological:[x]? Visual Disturbance []? Memory Loss [x]? Loss of Balance []? Slurred Speech []? Weakness []? Seizures  [x]? Dizziness []? Denies all unless marked    The MA has completed the ROS with the patient. I have reviewed it in its' entirety with the patient and agree with the documentation.      PHYSICAL EXAM  /88   Pulse 89   Temp 97.3 °F (36.3 °C)   Ht 5' 9\" (1.753 m)   Wt 180 lb (81.6 kg)   SpO2 97%   BMI 26.58 kg/m²       Constitutional - No acute distress    HEENT- Conjunctiva normal.  No scars, masses, or lesions over external nose or ears, no neck masses noted, no jugular vein distension, no bruit  Cardiac- Regular rate and rhythm  Pulmonary- Good expansion, normal effort without use of accessory muscles  Musculoskeletal - No significant wasting of muscles noted, no bony deformities  Extremities - No clubbing, cyanosis or edema  Skin - Warm, dry, and intact. No rash, erythema, or pallor  Psychiatric - Mood, affect, and behavior appear normal      NEUROLOGICAL EXAM     Mental status   [x] Awake, alert, oriented   [x]Affect attention and concentration appear appropriate  [x]Recent and remote memory appears unremarkable  [x]Speech normal without dysarthria or aphasia, comprehension and repetition intact. COMMENTS:    Cranial Nerves [x]No VF deficit to confrontation,  no papilledema on fundoscopic exam.  [x]PERRLA, EOMI, no nystagmus, conjugate eye movements, no ptosis  [x]Face symmetric  [x]Facial sensation intact  [x]Tongue midline no atrophy or fasciculations present  [x]Palate midline, hearing to finger rub normal bilaterally  [x]Shoulder shrug and SCM testing normal bilaterally  COMMENTS:   Motor   [x]5/5 strength x 4 extremities  [x]Normal bulk and tone  [x]No tremor present  [x]No rigidity or bradykinesia noted  COMMENTS:   Sensory  [x]Sensation intact to light touch, pin prick, vibration, and proprioception BLE  [x]Sensation intact to light touch, pin prick, vibration, and proprioception BUE  COMMENTS:   Coordination [x]FTN normal bilaterally   [x]HTS normal bilaterally  [x]KAI normal bilaterally.    COMMENTS:   Reflexes  [x]Symmetric and non-pathological  [x]Toes down going bilaterally  [x]No clonus present  COMMENTS:   Gait                  [x]Normal steady gait    []Ataxic    []Spastic     []Magnetic     []Shuffling  COMMENTS:       LABS RECORD AND IMAGING REVIEW (As below and per HPI)    No results found for: [id number]  No results found for: WBC, HGB, HCT, MCV, PLT  No results found for: NA, K, CL, CO2, BUN, CREATININE, GLUCOSE, CALCIUM, PROT, LABALBU, BILITOT, ALKPHOS, AST, ALT, LABGLOM, GFRAA, AGRATIO, GLOB  No results found for: CHOL, TRIG, HDL, LDLCALC  No results found for: TSH, T4FREE  No results found for: CRP, SEDRATE     MRI brain (6/2020)- normal     MRI cervical spine (6/2020)- mild DDD, moderate NF narrowing at C3/4 and C6/7    Reviewed referral records     ASSESSMENT:    Keyonna Dunn is a 43y.o. year old male here for evaluation of headaches. Exam today is non focal. Headaches began after striking his head on a large metal pipe at work, LOC noted. Suspect post concussive component to headaches and other symptoms mixed with migraine and TAC components as well. Will plan for additional work up with MRA head and add Topamax and Imitrex today. Should continue PT for neck pain, may need to consider pain management if it persists. ICD-10-CM    1. Migraine without aura and without status migrainosus, not intractable  G43.009 MRA HEAD WO CONTRAST   2. Neck pain  M54.2    3. Post concussion syndrome  F07.81      PLAN:  1. Titrate Topamax to 50mg BID. Discussed side effects including renal stones, paresthesias and cognitive changes. 2. Imitrex prn. Discussed side effects with patient. 3. MRA head   4. Continue physical therapy for neck pain  5. Soraya pisano paperwork filled out for patient today   6. Return in about 3 months (around 7/21/2021) for follow up, sooner if worsening.     Paulino Tenorio DNP, APRN

## 2021-04-21 NOTE — PROGRESS NOTES
REVIEW OF SYSTEMS    Constitutional: []Fever []Sweats []Chills [] Recent Injury [x] Denies all unless marked  HEENT:[x]Headache  [x] Head Injury [] Hearing Loss  [] Sore Throat  [] Ear Ache [] Denies all unless marked  Spine:  [x] Neck pain  [x] Back pain  [] Sciaticia  [] Denies all unless marked  Cardiovascular:[]Heart Disease []Palpitations [] Chest Pain   [x] Denies all unless marked  Pulmonary: []Shortness of Breath []Cough   [x] Denies all unless marked  Psychiatric/Behavioral:[] Depression [] Anxiety [x] Denies all unless marked  Gastrointestinal: [x]Nausea  []Vomiting  []Abdominal Pain  []Constipation  []Diarrhea  [] Denies all unless marked  Genitourinary:   [x] Frequency  [] Urgency  [] Dysuria [] Incontinence  [] Denies all unless marked  Extremities: []Pain  []Swelling  [x] Denies all unless marked  Musculoskeletal: [x] Myalgias  [x] Joint Pain  [] Arthritis [] Muscle Cramps [] Muscle Twitches  [] Denies all unless marked  Sleep: [x]Insomnia[]Snoring []Restless Legs  []Sleep Apnea  []Daytime Sleepiness  [] Denies all unless marked  Skin:[] Rash [] Color Change [x] Denies all unless marked   Neurological:[x]Visual Disturbance [] Memory Loss [x]Loss of Balance []Slurred Speech []Weakness []Seizures  [x] Dizziness [] Denies all unless marked

## 2021-04-23 ENCOUNTER — TELEPHONE (OUTPATIENT)
Dept: NEUROSURGERY | Age: 36
End: 2021-04-23

## 2021-04-23 ENCOUNTER — TREATMENT (OUTPATIENT)
Dept: PHYSICAL THERAPY | Facility: CLINIC | Age: 36
End: 2021-04-23

## 2021-04-23 DIAGNOSIS — M54.2 CERVICALGIA: ICD-10-CM

## 2021-04-23 DIAGNOSIS — S09.90XA HEAD TRAUMA, INITIAL ENCOUNTER: ICD-10-CM

## 2021-04-23 DIAGNOSIS — S06.0X1A CONCUSSION WITH LOSS OF CONSCIOUSNESS OF 30 MINUTES OR LESS, INITIAL ENCOUNTER: Primary | ICD-10-CM

## 2021-04-23 PROCEDURE — 97112 NEUROMUSCULAR REEDUCATION: CPT | Performed by: PHYSICAL THERAPIST

## 2021-04-23 NOTE — PROGRESS NOTES
Physical Therapy Treatment Note    Patient: London Calabrese                                                                                     Visit Date: 2021  :     1985    Referring practitioner:    Anthony Orona MD  Date of Initial Visit:          Type: THERAPY  Noted: 2020    Patient seen for 39 sessions    Visit Diagnoses:    ICD-10-CM ICD-9-CM   1. Concussion with loss of consciousness of 30 minutes or less, initial encounter  S06.0X1A 850.11   2. Cervicalgia  M54.2 723.1   3. Head trauma, initial encounter  S09.90XA 959.01     SUBJECTIVE     Subjective He reports the new medications have him feeling very groggy and feels like it's knocking him out, one makes him sleepy . He is sleeping a little better the HA's don't wake him up but you wake up with a HA.   PAIN: pre 7/10         OBJECTIVE     Objective      Neuromuscular Reeducation     25563 Comments   B side stepping with side tosses progressing from orange ball, to crunch ball then to glitter baseball the kid football with various spin  And end over end pattern.    B side stepping with simultaneous toss and catch with orange and green balls    Kick ball with rainbow ball            Timed Minutes 55       Therapy Education/Self Care 30475   Details:    Medbridge Code:    Given symptom relief   Progress: New   Who provided to: Patient   Level of understanding Verbalized   Timed Minutes      Total Timed Treatment:     55   mins  Total Time of Visit:             55   mins         ASSESSMENT/PLAN     GOALS  Goals                                          Progress Note due by 21   STG by: 21 Comments Status   Pt will demonstrate 50 degrees or greater (B) cervical rotaiton   Met                            LTG by: 2/3/21       Pt will demonstrate 65 degrees or greater (B) cervical rotaiton  R rotation 50 degrees L 60 degrees AROM  Ongoing   Pt will score 10 or less on the NDI  36 today  Ongoing   Pt will be indpendent with HEP to  include postural strengthening and vestibular exercises Reinforced today Ongoing   Pt will report no dizziness with bending over for one week  dizziness increased after 15 seconds and he had a loss of balance Ongoing   Pt will demonstrate SLS for 30 seconds or greater   L LE 10 sec R LE 8 sec Ongoing         Assessment/Plan     ASSESSMENT: He was more ignacia and able to progress more today with dynamic movements with simultaneous visual tracking as compared to his last performance of similar activities 2/16/21. He still had instances of increased blink rate and LOB's requiring CGA to minAx1 to correct but overall it was a better performance.    PLAN: Consider labeling different size and pattern balls with numbers and or letters and have him try to identify with rebounding, toss and catches in a static position and progress to dynamic activities as his tolerance improves.    Signature: Eusebio Gonzalez, PTA

## 2021-04-27 ENCOUNTER — TREATMENT (OUTPATIENT)
Dept: PHYSICAL THERAPY | Facility: CLINIC | Age: 36
End: 2021-04-27

## 2021-04-27 DIAGNOSIS — M54.2 CERVICALGIA: ICD-10-CM

## 2021-04-27 DIAGNOSIS — S09.90XA HEAD TRAUMA, INITIAL ENCOUNTER: ICD-10-CM

## 2021-04-27 DIAGNOSIS — S06.0X1A CONCUSSION WITH LOSS OF CONSCIOUSNESS OF 30 MINUTES OR LESS, INITIAL ENCOUNTER: Primary | ICD-10-CM

## 2021-04-27 PROCEDURE — 97110 THERAPEUTIC EXERCISES: CPT | Performed by: PHYSICAL THERAPIST

## 2021-04-27 PROCEDURE — 97112 NEUROMUSCULAR REEDUCATION: CPT | Performed by: PHYSICAL THERAPIST

## 2021-04-27 NOTE — PROGRESS NOTES
Physical Therapy Treatment Note    Patient: London Calabrese                                                                                     Visit Date: 2021  :     1985    Referring practitioner:    Anthony Orona MD  Date of Initial Visit:          Type: THERAPY  Noted: 2020    Patient seen for 40 sessions    Visit Diagnoses:    ICD-10-CM ICD-9-CM   1. Concussion with loss of consciousness of 30 minutes or less, initial encounter  S06.0X1A 850.11   2. Cervicalgia  M54.2 723.1   3. Head trauma, initial encounter  S09.90XA 959.01     SUBJECTIVE     Subjective He reports continued dizziness intermittent but HA are constant    PAIN: pre 5 /10 post 6/10         OBJECTIVE     Objective     Therapeutic Exercises    22147 Comments   Unicam seat 6 0 res x 14 min                    Timed Minutes 14     Neuromuscular Reeducation     08955 Comments   Dribbling 45 cm SB while walking first R hand then L hand x 40 ft x 2 each then alternating hand dribbling x 40 ft x 2, then progressed  To walking dribbling orange ball L hand then R hand x 40 ft    Dribbling 45 cm SB weaving through cones R hand then L hand x 80 ft each    Toss and catch identifying letter or number on ball x 8            Timed Minutes 36     Therapy Education/Self Care 09016   Details:    Given symptom relief   Progress: New and Reinforced   Education provided to:  Patient   Level of understanding Verbalized   Timed Minutes          Total Timed Treatment:     50   mins  Total Time of Visit:             50   mins         ASSESSMENT/PLAN     GOALS  Goals                                          Progress Note due by 21   STG by: 21 Comments Status   Pt will demonstrate 50 degrees or greater (B) cervical rotaiton   Met                            LTG by: 2/3/21       Pt will demonstrate 65 degrees or greater (B) cervical rotaiton  Ongoing   Pt will score 10 or less on the NDI  Ongoing   Pt will be indpendent with HEP to include  postural strengthening and vestibular exercises Addressed this today with dribbling around the cones Ongoing   Pt will report no dizziness with bending over for one week  Ongoing   Pt will demonstrate SLS for 30 seconds or greater   Ongoing         Assessment/Plan     ASSESSMENT: He expressed concerns about the Topamax as it makes him very sleepy and groggy. I addressed his concerns as best I could but I advised him he will need to take the medication as directed for at least eight weeks before he could anticipate the side effects reducing or abolishing. Today was the first session I had him work with a ball with various numbers and letters on it and had him attempt to call out a letter or number that he saw. Of the eight attempts he was able to identify an item twice but missed catching the ball each time. With dribbling he struggled with control on the L more than R but his consistency and accuracy with both sides improved over time.     PLAN: Continue to give him visual stimuli while mixing in positional, cognitive, and dynamic balance challenges.     Signature: Eusebio Gonzalez, PTA

## 2021-04-30 ENCOUNTER — TREATMENT (OUTPATIENT)
Dept: PHYSICAL THERAPY | Facility: CLINIC | Age: 36
End: 2021-04-30

## 2021-04-30 DIAGNOSIS — S09.90XA HEAD TRAUMA, INITIAL ENCOUNTER: ICD-10-CM

## 2021-04-30 DIAGNOSIS — S06.0X1A CONCUSSION WITH LOSS OF CONSCIOUSNESS OF 30 MINUTES OR LESS, INITIAL ENCOUNTER: Primary | ICD-10-CM

## 2021-04-30 DIAGNOSIS — M54.2 CERVICALGIA: ICD-10-CM

## 2021-04-30 PROCEDURE — 97140 MANUAL THERAPY 1/> REGIONS: CPT | Performed by: PHYSICAL THERAPIST

## 2021-04-30 PROCEDURE — 97112 NEUROMUSCULAR REEDUCATION: CPT | Performed by: PHYSICAL THERAPIST

## 2021-04-30 NOTE — PROGRESS NOTES
"Physical Therapy Treatment Note    Patient: London Calabrese                                                                                     Visit Date: 2021  :     1985    Referring practitioner:    Anthony Orona MD  Date of Initial Visit:          Type: THERAPY  Noted: 2020    Patient seen for 41 sessions    Visit Diagnoses:    ICD-10-CM ICD-9-CM   1. Concussion with loss of consciousness of 30 minutes or less, initial encounter  S06.0X1A 850.11   2. Cervicalgia  M54.2 723.1   3. Head trauma, initial encounter  S09.90XA 959.01     SUBJECTIVE     Subjective He reports that his new medicine is making him feel [place] and really \"knocked out\". He reports that his HA is increased today at a 6/10. He doesn't feel like the medicine is helping his headaches and when he wakes up he feels like \"he has been drinking all night\" Dizziness is the same as well. He reports being dizzy sitting in the chair today and more dizzy today than normal. He reports that on the Fifth he goes for his MRA's.     PAIN: Pre 6 /10 post 6/10         OBJECTIVE     Objective     Therapeutic Exercises    59049 Comments                       Timed Minutes      Neuromuscular Reeducation     89048 Comments   Standing in NBOS looking to the L and R and up and down searching for varying shapes taped to the wall.     Standing in Tandem stance looking to the L and R and up and down searching for varying shapes taped to the wall.  Struggled with having to look over R shoulder today    Tandem walking over beams looking at his feet initially, then looking up     Kicking soccer ball to knock over cones.     Kicking soccer ball when rolled to him     Timed Minutes 30     Manual Therapy     [id number]  Comments   B UT, LS, Upper thoracic and cervical paraspinals STM with massage cream  Mod   B suboccipitals STM and release.                 Timed Minutes 15            Therapy Education/Self Care 49719   Details: Talking with his Dr about his " medication change.    Given symptom relief   Progress: New and Reinforced   Education provided to:  Patient   Level of understanding Verbalized   Timed Minutes          Total Timed Treatment:     45   mins  Total Time of Visit:             45   mins         ASSESSMENT/PLAN     GOALS  Goals                                          Progress Note due by 5/19/21   STG by: 1/31/21 Comments Status   Pt will demonstrate 50 degrees or greater (B) cervical rotaiton   Met                            LTG by: 2/3/21       Pt will demonstrate 65 degrees or greater (B) cervical rotaiton  Ongoing   Pt will score 10 or less on the NDI  Ongoing   Pt will be indpendent with HEP to include postural strengthening and vestibular exercises  Ongoing   Pt will report no dizziness with bending over for one week Continued dizziness  Ongoing   Pt will demonstrate SLS for 30 seconds or greater   Ongoing         Assessment/Plan     ASSESSMENT: he is still struggling with his new medication change and was very drousy today. He also reported some increased stiffness in his neck which was resolved post session. He did well with balance challenges but struggled when looking over the R shoulder today and with tandem stance activities. He actually did better kicking soccer ball than expected.     PLAN: Continue to give him visual stimuli while mixing in positional, cognitive, and dynamic balance challenges. Continue with manual as needed.     Signature: Pearl Malik, PTA

## 2021-05-03 ENCOUNTER — TREATMENT (OUTPATIENT)
Dept: PHYSICAL THERAPY | Facility: CLINIC | Age: 36
End: 2021-05-03

## 2021-05-03 DIAGNOSIS — S09.90XA HEAD TRAUMA, INITIAL ENCOUNTER: ICD-10-CM

## 2021-05-03 DIAGNOSIS — M54.2 CERVICALGIA: ICD-10-CM

## 2021-05-03 DIAGNOSIS — S06.0X1A CONCUSSION WITH LOSS OF CONSCIOUSNESS OF 30 MINUTES OR LESS, INITIAL ENCOUNTER: Primary | ICD-10-CM

## 2021-05-03 PROCEDURE — 97110 THERAPEUTIC EXERCISES: CPT | Performed by: PHYSICAL THERAPIST

## 2021-05-03 PROCEDURE — 97140 MANUAL THERAPY 1/> REGIONS: CPT | Performed by: PHYSICAL THERAPIST

## 2021-05-03 NOTE — PROGRESS NOTES
"Physical Therapy Treatment Note    Patient: London Calabrese                                                                                     Visit Date: 5/3/2021  :     1985    Referring practitioner:    Anthony Orona MD  Date of Initial Visit:          Type: THERAPY  Noted: 2020    Patient seen for 42 sessions    Visit Diagnoses:    ICD-10-CM ICD-9-CM   1. Concussion with loss of consciousness of 30 minutes or less, initial encounter  S06.0X1A 850.11   2. Cervicalgia  M54.2 723.1   3. Head trauma, initial encounter  S09.90XA 959.01     SUBJECTIVE     Subjective He reports \"the medication is kicking my but hard.\" He feels like the Immitrex is \"knocking out\" his HA's, but the Topamax is making him feel, \"hung over\"    PAIN: pre 6/10         OBJECTIVE     Objective      Therapeutic Exercises    81542 Comments   Prone B unilateral \"Y's\" on 65 cm SB x 15    Seated rows with black T band x 12 Elicited pain in T 4 area working superior    Progressive thoracic stretch on / foam roller    HL marching on  bolster         Timed Minutes 25     Manual Therapy     45823  Comments   STM B UT and T4 region In massage chair   Cervical sub occipital releases, traction, traction with B lateral bends Grade 3 sustained HL               Timed Minutes 30          Therapy Education/Self Care 55775   Details:    Given symptom relief   Progress: Reinforced   Education provided to:  Patient   Level of understanding Verbalized   Timed Minutes          Total Timed Treatment:     55   mins  Total Time of Visit:             55   mins         ASSESSMENT/PLAN     GOALS  Goals                                          Progress Note due by 21   STG by: 21 Comments Status   Pt will demonstrate 50 degrees or greater (B) cervical rotaiton   Met                            LTG by: 2/3/21       Pt will demonstrate 65 degrees or greater (B) cervical rotaiton   Ongoing   Pt will score 10 or less on the NDI   Ongoing   Pt will " be indpendent with HEP to include postural strengthening and vestibular exercises   Ongoing   Pt will report no dizziness with bending over for one week  addressed with prone  Ongoing   Pt will demonstrate SLS for 30 seconds or greater    Ongoing         Assessment/Plan     ASSESSMENT: He did not present with a significant amount of soft tissue restrictions in the upper thoracic region; however, his B UT are still restricted and I believe his pain report during seated rows may have been a referral pain. He has had a little more difficulty with his progression over the last three sessions but I would attribute this to the side effects from the Topamax.     PLAN: Consider a trial DN treatment within the next few sessions to his B suboccipital region. Continue to reinforce compliance with the administration of Topamax and monitor his symptoms and progression while he is progressively increasing its' dosage.    Signature: Eusebio Gonzalez, PTA

## 2021-05-05 ENCOUNTER — HOSPITAL ENCOUNTER (OUTPATIENT)
Dept: MRI IMAGING | Age: 36
Discharge: HOME | End: 2021-05-05
Payer: COMMERCIAL

## 2021-05-05 DIAGNOSIS — G43.009 MIGRAINE WITHOUT AURA AND WITHOUT STATUS MIGRAINOSUS, NOT INTRACTABLE: ICD-10-CM

## 2021-05-05 PROCEDURE — 70544 MR ANGIOGRAPHY HEAD W/O DYE: CPT

## 2021-05-06 ENCOUNTER — TREATMENT (OUTPATIENT)
Dept: PHYSICAL THERAPY | Facility: CLINIC | Age: 36
End: 2021-05-06

## 2021-05-06 DIAGNOSIS — S09.90XA HEAD TRAUMA, INITIAL ENCOUNTER: ICD-10-CM

## 2021-05-06 DIAGNOSIS — S06.0X1A CONCUSSION WITH LOSS OF CONSCIOUSNESS OF 30 MINUTES OR LESS, INITIAL ENCOUNTER: Primary | ICD-10-CM

## 2021-05-06 DIAGNOSIS — M54.2 CERVICALGIA: ICD-10-CM

## 2021-05-06 PROCEDURE — 97112 NEUROMUSCULAR REEDUCATION: CPT | Performed by: PHYSICAL THERAPIST

## 2021-05-06 NOTE — PROGRESS NOTES
"Physical Therapy Treatment Note    Patient: London Calabrese                                                                                     Visit Date: 2021  :     1985    Referring practitioner:    Anthony Orona MD  Date of Initial Visit:          Type: THERAPY  Noted: 2020    Patient seen for 43 sessions    Visit Diagnoses:    ICD-10-CM ICD-9-CM   1. Concussion with loss of consciousness of 30 minutes or less, initial encounter  S06.0X1A 850.11   2. Cervicalgia  M54.2 723.1   3. Head trauma, initial encounter  S09.90XA 959.01     SUBJECTIVE     Subjective He reports he is still taking the medication but he spoke with his physician and was advised to stay on it another two weeks. He has a HA and still has some dizziness since the medication. He feels like he has a \"cloudy sensation in his head.\"    PAIN:  Pre 5/10 post 6/10         OBJECTIVE     Objective      Neuromuscular Reeducation     46204 Comments   balloon voley ball    Volley ball with orange ball    Soccer kick ball    Toss and catch small football with spirals and end over end then same with 2 color small football        Timed Minutes 45       Therapy Education/Self Care 35711   Details:    Given symptom relief   Progress: Reinforced   Education provided to:  Patient   Level of understanding Verbalized   Timed Minutes          Total Timed Treatment:     45   mins  Total Time of Visit:             45   mins         ASSESSMENT/PLAN     GOALS  Goals                                          Progress Note due by 21   STG by: 21 Comments Status   Pt will demonstrate 50 degrees or greater (B) cervical rotaiton   Met                            LTG by: 2/3/21       Pt will demonstrate 65 degrees or greater (B) cervical rotaiton   Ongoing   Pt will score 10 or less on the NDI   Ongoing   Pt will be indpendent with HEP to include postural strengthening and vestibular exercises   Ongoing   Pt will report no dizziness with bending over " for one week  challenged this with soccer kick ball Ongoing   Pt will demonstrate SLS for 30 seconds or greater             Assessment/Plan     ASSESSMENT: He is still struggling due to the side effects caused by Topamax but I encouraged him to continue taking the dosage as directed and strongly advised him not to quit taking the medication cold turkey.     PLAN: Consider a trial DN treatment within the next few sessions to his B suboccipital region. Continue to reinforce compliance with the administration of Topamax and monitor his symptoms and progression while he is progressively increasing its' dosage.    Signature: Euseibo Gonzalez, PTA

## 2021-05-07 ENCOUNTER — TELEPHONE (OUTPATIENT)
Dept: NEUROLOGY | Age: 36
End: 2021-05-07

## 2021-05-07 NOTE — TELEPHONE ENCOUNTER
Patient called stating that the Topamax is making him extremely sleepy/tired. He is complaining of a medication hangover type of feeling. He states it's very hard to function especially in the mornings. He is only taking half a tablet of topamax. He does think the Imitrex is helping his headaches for the most part.

## 2021-05-10 RX ORDER — TOPIRAMATE 100 MG/1
100 CAPSULE, EXTENDED RELEASE ORAL DAILY
Qty: 30 CAPSULE | Refills: 3 | Status: SHIPPED | OUTPATIENT
Start: 2021-05-10 | End: 2021-06-25 | Stop reason: SDUPTHER

## 2021-05-10 NOTE — TELEPHONE ENCOUNTER
Caro Kidd with patient worked comp case. She stated we could go ahead and send the trokendi to the pharmacy and they  will do the rest. Advised us to tell patient if there is a huge copay for this to let them know before picking it up.

## 2021-05-10 NOTE — TELEPHONE ENCOUNTER
See if we can't get Tiera approved. In the mean time have him cut down to 0.5 tablet once daily then he can slowly titrate up by 0.5 tablet every 2 weeks.

## 2021-05-10 NOTE — TELEPHONE ENCOUNTER
How much Topamax is he taking? We could try to cut back to a lower dose and slowly titrate the medication. If Trokendi would be covered we could try Trokendi- might be better tolerated from a side effect standpoint.

## 2021-05-10 NOTE — TELEPHONE ENCOUNTER
Called patient to let him know to come by office to  trokendi samples so I would be able to explain titration better. Patient also instructed not to  medication if there was a big copay and to call his worker comp order, he voiced understanding.  Stated he will stop by soon to get samples

## 2021-05-10 NOTE — TELEPHONE ENCOUNTER
Will send Trokendi to the pharmacy. Have him come in for samples to titrate up. Will titrate up slower to help reduce side effects    Week 1 and 2: Take 25mg daily   Week 3 and 4: Take 50mg daily   Week 5 and 6: Take 75mg daily   Week 7 and on:  Take 100mg daily

## 2021-05-11 ENCOUNTER — TREATMENT (OUTPATIENT)
Dept: PHYSICAL THERAPY | Facility: CLINIC | Age: 36
End: 2021-05-11

## 2021-05-11 DIAGNOSIS — S06.0X1A CONCUSSION WITH LOSS OF CONSCIOUSNESS OF 30 MINUTES OR LESS, INITIAL ENCOUNTER: Primary | ICD-10-CM

## 2021-05-11 DIAGNOSIS — S09.90XA HEAD TRAUMA, INITIAL ENCOUNTER: ICD-10-CM

## 2021-05-11 DIAGNOSIS — M54.2 CERVICALGIA: ICD-10-CM

## 2021-05-11 PROCEDURE — 97140 MANUAL THERAPY 1/> REGIONS: CPT | Performed by: PHYSICAL THERAPIST

## 2021-05-11 PROCEDURE — 97112 NEUROMUSCULAR REEDUCATION: CPT | Performed by: PHYSICAL THERAPIST

## 2021-05-11 NOTE — PROGRESS NOTES
Physical Therapy Treatment Note    Patient: London Calabrese                                                                                     Visit Date: 2021  :     1985    Referring practitioner:    Anthony Orona MD  Date of Initial Visit:          Type: THERAPY  Noted: 2020    Patient seen for 44 sessions    Visit Diagnoses:    ICD-10-CM ICD-9-CM   1. Concussion with loss of consciousness of 30 minutes or less, initial encounter  S06.0X1A 850.11   2. Cervicalgia  M54.2 723.1   3. Head trauma, initial encounter  S09.90XA 959.01     SUBJECTIVE     Subjective He states he is going to change medication new dose and meds tonight.    PAIN: 4/10 post 6/10 HA pain         OBJECTIVE     Objective      Neuromuscular Reeducation     88836 Comments   Tall knealing roll and field, toss and catch, and simultaneous roll and field progressed with adding different bounces, spins and from orange/green ball to small gel balls and varied tracking distance                    Timed Minutes 31     Manual Therapy     54725  Comments   Cervical suboccipital releases, manual traction, traction with B lateral bends Grades 2 and 3 HL                   Timed Minutes 10          Therapy Education/Self Care 73533   Details:    Given symptom relief   Progress: Reinforced   Education provided to:  Patient   Level of understanding Verbalized   Timed Minutes          Total Timed Treatment:     41   mins  Total Time of Visit:             41   mins         ASSESSMENT/PLAN     GOALS  Goals                                          Progress Note due by 21   STG by: 21 Comments Status   Pt will demonstrate 50 degrees or greater (B) cervical rotaiton   Met                            LTG by: 2/3/21       Pt will demonstrate 65 degrees or greater (B) cervical rotaiton   Ongoing   Pt will score 10 or less on the NDI   Ongoing   Pt will be indpendent with HEP to include postural strengthening and vestibular exercises   Ongoing    Pt will report no dizziness with bending over for one week   Ongoing   Pt will demonstrate SLS for 30 seconds or greater              Assessment/Plan     ASSESSMENT: He was running a little late today but the schedule allowed us to make up a majority of the time. He is getting better and more proficient fielding and moving to his L but it causes an increased blink rate and increases his HA pain.    PLAN:  Consider adding a new objective measure for dizziness and continue working in tall knealing, quad-ped and prone.    Signature: Eusebio Gonzalez, PTA

## 2021-05-13 ENCOUNTER — TREATMENT (OUTPATIENT)
Dept: PHYSICAL THERAPY | Facility: CLINIC | Age: 36
End: 2021-05-13

## 2021-05-13 DIAGNOSIS — S09.90XA HEAD TRAUMA, INITIAL ENCOUNTER: ICD-10-CM

## 2021-05-13 DIAGNOSIS — S06.0X1A CONCUSSION WITH LOSS OF CONSCIOUSNESS OF 30 MINUTES OR LESS, INITIAL ENCOUNTER: Primary | ICD-10-CM

## 2021-05-13 DIAGNOSIS — M54.2 CERVICALGIA: ICD-10-CM

## 2021-05-13 PROCEDURE — 97530 THERAPEUTIC ACTIVITIES: CPT | Performed by: PHYSICAL THERAPIST

## 2021-05-13 PROCEDURE — 97112 NEUROMUSCULAR REEDUCATION: CPT | Performed by: PHYSICAL THERAPIST

## 2021-05-13 NOTE — PROGRESS NOTES
"Physical Therapy Treatment Note    Patient: London Calabrese                                                                                     Visit Date: 2021  :     1985    Referring practitioner:    Anthony Orona MD  Date of Initial Visit:          Type: THERAPY  Noted: 2020    Patient seen for 45 sessions    Visit Diagnoses:    ICD-10-CM ICD-9-CM   1. Concussion with loss of consciousness of 30 minutes or less, initial encounter  S06.0X1A 850.11   2. Cervicalgia  M54.2 723.1   3. Head trauma, initial encounter  S09.90XA 959.01     SUBJECTIVE     Subjective He has had some sleep issues first night on the new medication he\"passed out\" the second night was up until 2 or 3 in the morning. He expressed concerns about his recent visit with a physician in [place]. He states he really wants to go back to work but is concerned about the potential for injury especially since he has some intermittent balance issues.    PAIN: pre 4/10 post 6/10         OBJECTIVE     Objective      Therapeutic Activities    14201 Comments   At length discussion about his recent physician encounter and new medication as well as ability to return to work full duty and how his symptoms and condition effect his emotional state.                    Timed Minutes 10     Neuromuscular Reeducation     70733 Comments   RH then LH dribble with turns x 80 ft first with 45 cm SB then orange ball.                    Timed Minutes 30       Therapy Education/Self Care 10984   Details:    Given symptom relief   Progress: New and Reinforced   Education provided to:  Patient   Level of understanding Verbalized   Timed Minutes          Total Timed Treatment:     40   mins  Total Time of Visit:             40   mins         ASSESSMENT/PLAN     GOALS  Goals                                          Progress Note due by 21   STG by: 21 Comments Status   Pt will demonstrate 50 degrees or greater (B) cervical rotaiton   Met          "                   LTG by: 2/3/21       Pt will demonstrate 65 degrees or greater (B) cervical rotaiton   Ongoing   Pt will score 10 or less on the NDI   Ongoing   Pt will be indpendent with HEP to include postural strengthening and vestibular exercises   Ongoing   Pt will report no dizziness with bending over for one week   Ongoing   Pt will demonstrate SLS for 30 seconds or greater     Ongoing         Assessment/Plan     ASSESSMENT: He had some concerns after his last doctor's visit with a physician in [place] He has a strong desire to return to work but at this time returning to work especially if his duties were to require him to work at an elevated height would be ill advised. While I agree with the aforementioned physician that he has had post concussion syndrome symptoms for an extended period and the normal time frame for symptom resolution is up to a year he is still progressing with P.T. and did not begin the rehabilitation process until November 20, 2020.     PLAN: Review all goals and update a authorization survey. If possible perform a trial of DN'ing as soon as the opportunity presents itself     Signature: Eusebio Gonzalez, PTA

## 2021-05-17 ENCOUNTER — TELEPHONE (OUTPATIENT)
Dept: NEUROSURGERY | Age: 36
End: 2021-05-17

## 2021-05-17 NOTE — TELEPHONE ENCOUNTER
Faustino Caruso with [employer] called requesting that we move patient appointment up. Last visit EG stated to follow up in 3 months. However Faustino Caruso voiced that workers comp wanted his appointment moved up because they like to do follow ups every 4-6 weeks.

## 2021-05-17 NOTE — TELEPHONE ENCOUNTER
I disagree that workman's comp can dictate when his appointments are. It makes no sense to follow up in 4-6 weeks when he will just at the full dose of Trokendi so we will not truly know the efficacy of the medication at that point. Keep his appointments as we discussed.

## 2021-05-18 ENCOUNTER — TREATMENT (OUTPATIENT)
Dept: PHYSICAL THERAPY | Facility: CLINIC | Age: 36
End: 2021-05-18

## 2021-05-18 DIAGNOSIS — S06.0X1A CONCUSSION WITH LOSS OF CONSCIOUSNESS OF 30 MINUTES OR LESS, INITIAL ENCOUNTER: Primary | ICD-10-CM

## 2021-05-18 DIAGNOSIS — S09.90XA HEAD TRAUMA, INITIAL ENCOUNTER: ICD-10-CM

## 2021-05-18 DIAGNOSIS — M54.2 CERVICALGIA: ICD-10-CM

## 2021-05-18 PROCEDURE — 97530 THERAPEUTIC ACTIVITIES: CPT | Performed by: PHYSICAL THERAPIST

## 2021-05-18 PROCEDURE — 97140 MANUAL THERAPY 1/> REGIONS: CPT | Performed by: PHYSICAL THERAPIST

## 2021-05-18 NOTE — PROGRESS NOTES
Physical Therapy 30 Day Progress Note    Patient: London Calabrese                                                                                     Visit Date: 2021  :     1985    Referring practitioner:    Anthony Orona MD  Date of Initial Visit:          Type: THERAPY  Noted: 2020    Patient seen for 46 sessions    Visit Diagnoses:    ICD-10-CM ICD-9-CM   1. Concussion with loss of consciousness of 30 minutes or less, initial encounter  S06.0X1A 850.11   2. Cervicalgia  M54.2 723.1   3. Head trauma, initial encounter  S09.90XA 959.01     SUBJECTIVE     Subjective He reports mild dizziness right now and always has some level of dizziness. Reports mild HA pain reports medication side effects are improved.     PAIN: pre 4/10 post 3.5/10  Dizziness Handicap Inventory (initial score) 56  PT G-Codes  Outcome Measure Options: Neck Disability Index (NDI)  Neck Disability Index Score: 27    OBJECTIVE     Objective      Therapeutic Activities    59758 Comments   Obtained an updated NDI and an initial Dizziness Handicap Inventory (see outcome measures section)    Reviewed all goals for progress note and competed an updated authorization survey for continued authorizations (see goals and media sections)                Timed Minutes 25     Manual Therapy     21100  Comments   Cervical suboccipital releases, manual traction, and traction with B lateral bends Grade 3 sustained HL                    Timed Minutes 16          Therapy Education/Self Care 64552   Details:    Given symptom relief   Progress: Reinforced   Education provided to:  Patient   Level of understanding Verbalized   Timed Minutes            Total Timed Treatment:     41   mins  Total Time of Visit:             41   mins         ASSESSMENT/PLAN     GOALS  Goals                                          Progress Note due by 21   STG by: 21 Comments Status   Pt will demonstrate 50 degrees or greater (B) cervical rotaiton   Met                             LTG by: 2/3/21       Pt will demonstrate 65 degrees or greater (B) cervical rotaiton  R degrees 57 degrees L 59 degrees AROM Ongoing   Pt will score 10 or less on the NDI  score 27 today, 36 last progress note Ongoing   Pt will be indpendent with HEP to include postural strengthening and vestibular exercises  reports daily compliance Ongoing   Pt will report no dizziness with bending over for one week  increases dizziness consistently and rather quickly per his report Ongoing   Pt will demonstrate SLS for 30 seconds or greater   L LE 13 sec R LE 9 sec  Ongoing       Assessment/Plan       ASSESSMENT:  During the initial evaluation his R cervical rotation was 46 degrees AROM and today it has increased to 57 degrees AROM. The L cervical rotation was initially 43 degrees AROM with pain and it has increased to 59 degrees and did not increase pain. He has had a nine point decrease in his NDI score in the last thirty days as well. We have been focusing on challenging his visual tracking in varied positions such as tall kneeling and quad ped to address his post concussion syndrome symptoms. With the aforementioned he has made progress but he still exhibits some balance issues as well as disruption of the proper function of his vestibular system. During the initial evaluation he was unable to perform SLS on either LE but today he was able toe perform SLS for 13 seconds on his L LE and 9 seconds on his R LE.      PLAN: Submit a continued authorization request and continue utilizing the BITS as well as the patterned balls in positions that are slightly but minimally symptom provocative but increase his blink rate and causes him to have to challenge his VOR.    Signature: Eusebio Gonzalez, PTA

## 2021-05-20 NOTE — PROGRESS NOTES
Progress Note Addendum      Patient: London Calabrese           : 1985  Visit Date: 2021  Referring practitioner: Anthony Orona MD  Date of Initial Visit: Type: THERAPY  Noted: 2020  Patient seen for 46 sessions  Visit Diagnoses:    ICD-10-CM ICD-9-CM   1. Concussion with loss of consciousness of 30 minutes or less, initial encounter  S06.0X1A 850.11   2. Cervicalgia  M54.2 723.1   3. Head trauma, initial encounter  S09.90XA 959.01       PT G-Codes  Outcome Measure Options: Neck Disability Index (NDI)  Neck Disability Index Score: 27  Clinical Progress: improved  Home Program Compliance: Yes  Treatment has included: therapeutic exercise, neuromuscular re-education, manual therapy, therapeutic activity and gait training  Progress toward previous goals: Partially Met  Prognosis to achieve goals: good    Subjective     Objective     Assessment & Plan     Assessment  Impairments: abnormal coordination, abnormal gait, activity intolerance and impaired balance  Prognosis: good  Functional Limitations: sleeping, walking, standing and stooping  Plan  Planned therapy interventions: manual therapy, balance/weight-bearing training, body mechanics training, neuromuscular re-education, motor coordination training, postural training, fine motor coordination training, soft tissue mobilization, spinal/joint mobilization, strengthening, functional ROM exercises, flexibility, gait training, stretching, therapeutic activities, home exercise program, IADL retraining and joint mobilization  Frequency: 2x week  Duration in visits: 8  Duration in weeks: 4  Treatment plan discussed with: PTA        I have reviewed the progress note information provided by Eusebio Gonzalez PTA, and I concur with the findings.    Ivette Betts, PT DPT  Physical Therapist

## 2021-05-21 ENCOUNTER — TREATMENT (OUTPATIENT)
Dept: PHYSICAL THERAPY | Facility: CLINIC | Age: 36
End: 2021-05-21

## 2021-05-21 DIAGNOSIS — M54.2 CERVICALGIA: ICD-10-CM

## 2021-05-21 DIAGNOSIS — S09.90XA HEAD TRAUMA, INITIAL ENCOUNTER: ICD-10-CM

## 2021-05-21 DIAGNOSIS — S06.0X1A CONCUSSION WITH LOSS OF CONSCIOUSNESS OF 30 MINUTES OR LESS, INITIAL ENCOUNTER: Primary | ICD-10-CM

## 2021-05-21 PROCEDURE — 97112 NEUROMUSCULAR REEDUCATION: CPT | Performed by: PHYSICAL THERAPIST

## 2021-05-21 PROCEDURE — 97140 MANUAL THERAPY 1/> REGIONS: CPT | Performed by: PHYSICAL THERAPIST

## 2021-05-21 NOTE — PROGRESS NOTES
"Physical Therapy Treatment Note    Patient: London Calabrese                                                                                     Visit Date: 2021  :     1985    Referring practitioner:    SERGIO Genao  Date of Initial Visit:          Type: THERAPY  Noted: 2020    Patient seen for 47 sessions    Visit Diagnoses:    ICD-10-CM ICD-9-CM   1. Concussion with loss of consciousness of 30 minutes or less, initial encounter  S06.0X1A 850.11   2. Cervicalgia  M54.2 723.1   3. Head trauma, initial encounter  S09.90XA 959.01     SUBJECTIVE     Subjective He is not having a headache or anything this date but is having some dizziness. He reports feeling as if \"he has been on a roller coaster\"    PAIN: 0/10 post 6/10         OBJECTIVE     Objective      Manual Therapy     16363  Comments   Dry needle B suboccipitals, 4 needles .5    Suboccipital STM and distraction                 Timed Minutes 10        Neuromuscular Reeducation     84950 Comments   Side stepping with tie-dye ball toss reaching outside MANSOOR to the right and left    Tall kneeling ball taps with emphasis on overhead throws    Standing ball toss taps overhead working on visual tracking and balance    Side to side rolling ball and fielding  Resulted in increased dizziness and headache   Mixture of rolling and throwing ball to mix up planes of motion and eye level     Timed Minutes 30       Therapy Education/Self Care 17974   Details:    Given symptom relief   Progress: New and Reinforced   Education provided to:  Patient   Level of understanding Verbalized   Timed Minutes          Total Timed Treatment:     40   mins  Total Time of Visit:             40   mins         ASSESSMENT/PLAN     GOALS  Goals                                          Progress Note due by 21   STG by: 21 Comments Status   Pt will demonstrate 50 degrees or greater (B) cervical rotaiton   Met                            LTG by: 2/3/21       Pt " will demonstrate 65 degrees or greater (B) cervical rotaiton   Ongoing   Pt will score 10 or less on the NDI   Ongoing   Pt will be indpendent with HEP to include postural strengthening and vestibular exercises   Ongoing   Pt will report no dizziness with bending over for one week  reproduced today Ongoing   Pt will demonstrate SLS for 30 seconds or greater     Ongoing         Assessment/Plan     ASSESSMENT: We dry needled again for the first time in a while in the suboccipitals. He initially had some tenderness with this but that resolved after STM and distraction. Post session he did not have a headache and reported feeling well. He continues to have the most difficulty with forward bent activity as it reproduces headache and dizziness. When reaching up for the ball overhead he is hesitant but it did not reproduce symptoms and performance got better with practice.    PLAN: Assess his response to needling and progress with activity changing his eye level working both overhead and around his feet.    Signature: Juanjose Turcios, PT DPT

## 2021-05-21 NOTE — TELEPHONE ENCOUNTER
Secure email sent to Wanda Chavez due to no answer multiple times this week on 5/18/2021. Called and spoke with Janes Martin with workers comp. I explained to her why we would not move his appointment up at this time per EG. Francois Anthony voiced understanding and stated she would let Wandasonam Chavez know when she is back in office.

## 2021-05-25 ENCOUNTER — TREATMENT (OUTPATIENT)
Dept: PHYSICAL THERAPY | Facility: CLINIC | Age: 36
End: 2021-05-25

## 2021-05-25 DIAGNOSIS — S06.0X1A CONCUSSION WITH LOSS OF CONSCIOUSNESS OF 30 MINUTES OR LESS, INITIAL ENCOUNTER: Primary | ICD-10-CM

## 2021-05-25 DIAGNOSIS — M54.2 CERVICALGIA: ICD-10-CM

## 2021-05-25 DIAGNOSIS — S09.90XA HEAD TRAUMA, INITIAL ENCOUNTER: ICD-10-CM

## 2021-05-25 PROCEDURE — 97112 NEUROMUSCULAR REEDUCATION: CPT | Performed by: PHYSICAL THERAPIST

## 2021-05-25 NOTE — PROGRESS NOTES
Physical Therapy Treatment Note    Patient: London Calabrese                                                                                     Visit Date: 2021  :     1985    Referring practitioner:    Anthony Orona MD  Date of Initial Visit:          Type: THERAPY  Noted: 2020    Patient seen for 48 sessions    Visit Diagnoses:    ICD-10-CM ICD-9-CM   1. Concussion with loss of consciousness of 30 minutes or less, initial encounter  S06.0X1A 850.11   2. Cervicalgia  M54.2 723.1   3. Head trauma, initial encounter  S09.90XA 959.01     SUBJECTIVE     Subjective He reports he had to have his gums cut open and stiched because of his implants and didn't sleep well last slight HA pain right now    PAIN: pre and post 4/10         OBJECTIVE     Objective      Neuromuscular Reeducation     56676 Comments   Visual pursuit then added central fixation, sequencing number, letters then both then competition mode words, letters, numbers then letters and numbers @ BITS machine    Batting with tie dye, rainbow, spiral,and circular pattern balls                Timed Minutes 45       Therapy Education/Self Care 88468   Details:    Given symptom relief   Progress: Reinforced   Education provided to:  Patient   Level of understanding Verbalized   Timed Minutes          Total Timed Treatment:     45   mins  Total Time of Visit:             45   mins         ASSESSMENT/PLAN     GOALS  Goals                                          Progress Note due by 21   STG by: 21 Comments Status   Pt will demonstrate 50 degrees or greater (B) cervical rotaiton   Met                            LTG by: 2/3/21       Pt will demonstrate 65 degrees or greater (B) cervical rotaiton   Ongoing   Pt will score 10 or less on the NDI   Ongoing   Pt will be indpendent with HEP to include postural strengthening and vestibular exercises   Ongoing   Pt will report no dizziness with bending over for one week  Ongoing   Pt will  demonstrate SLS for 30 seconds or greater     Ongoing         Assessment/Plan     ASSESSMENT: His reaction time was cut in half today with the BITS activities in comparison to December's records. His accuracy was improved as well which exhibits that he is making progress.    PLAN: Consider having him perform various tasks with the patterned balls while looking down.    Signature: Eusebio Gonzalez, PTA

## 2021-05-27 ENCOUNTER — TREATMENT (OUTPATIENT)
Dept: PHYSICAL THERAPY | Facility: CLINIC | Age: 36
End: 2021-05-27

## 2021-05-27 DIAGNOSIS — S06.0X1A CONCUSSION WITH LOSS OF CONSCIOUSNESS OF 30 MINUTES OR LESS, INITIAL ENCOUNTER: Primary | ICD-10-CM

## 2021-05-27 DIAGNOSIS — S09.90XA HEAD TRAUMA, INITIAL ENCOUNTER: ICD-10-CM

## 2021-05-27 DIAGNOSIS — M54.2 CERVICALGIA: ICD-10-CM

## 2021-05-27 PROCEDURE — 97112 NEUROMUSCULAR REEDUCATION: CPT | Performed by: PHYSICAL THERAPIST

## 2021-05-27 PROCEDURE — 97140 MANUAL THERAPY 1/> REGIONS: CPT | Performed by: PHYSICAL THERAPIST

## 2021-05-27 NOTE — PROGRESS NOTES
"Physical Therapy Treatment Note    Patient: London Calabrese                                                                                     Visit Date: 2021  :     1985    Referring practitioner:    Anthony Orona MD  Date of Initial Visit:          Type: THERAPY  Noted: 2020    Patient seen for 49 sessions    Visit Diagnoses:    ICD-10-CM ICD-9-CM   1. Concussion with loss of consciousness of 30 minutes or less, initial encounter  S06.0X1A 850.11   2. Cervicalgia  M54.2 723.1   3. Head trauma, initial encounter  S09.90XA 959.01     SUBJECTIVE     Subjective \"The HA's are disappearing\" he reports the dizziness is still there. Denies HA right now, but reports dizziness. He reports a little neck pain.    PAIN: pre 410 post 4.5/10         OBJECTIVE     Objective      Therapeutic Exercises    24331 Comments   Unicam seat #5 res 2 x 8 min                    Timed Minutes 8     Neuromuscular Reeducation     55458 Comments   Putting and tracking with rainbow ball x 10 each direction with regular  then same with toy   (seated rerst break between switching sides)                     Timed Minutes 47     Manual Therapy     95211  Comments   Cervical suboccipital releases, manual traction, and traction with B lateral bends Grade 3 sustained HL                   Timed Minutes 15        Therapy Education/Self Care 22899   Details:    Given symptom relief   Progress: Reinforced   Education provided to:  Patient   Level of understanding Verbalized   Timed Minutes          Total Timed Treatment:    70   mins  Total Time of Visit:             70  mins         ASSESSMENT/PLAN     GOALS  Goals                                          Progress Note due by 21   STG by: 21 Comments Status   Pt will demonstrate 50 degrees or greater (B) cervical rotaiton   Met                            LTG by: 2/3/21       Pt will demonstrate 65 degrees or greater (B) cervical rotaiton   Ongoing   Pt will " score 10 or less on the NDI   Ongoing   Pt will be indpendent with HEP to include postural strengthening and vestibular exercises   Ongoing   Pt will report no dizziness with bending over for one week Addressed with putting and tracking activity today. Ongoing   Pt will demonstrate SLS for 30 seconds or greater     Ongoing         Assessment/Plan     ASSESSMENT: The putting and tracking activity was quite time consuming today due to he had to take time between each one due to it causing increased dizziness and blink rate. It was beneficial that we had additional time available both due to the aforementioned and it allowed for manual techniques to be performed post symptom provocation.    PLAN: Assess his long term response to today's session and consider duplicating the session and assess for ability to progress.    Signature: Eusebio Gonzalez, PTA

## 2021-06-01 ENCOUNTER — TREATMENT (OUTPATIENT)
Dept: PHYSICAL THERAPY | Facility: CLINIC | Age: 36
End: 2021-06-01

## 2021-06-01 DIAGNOSIS — S06.0X1A CONCUSSION WITH LOSS OF CONSCIOUSNESS OF 30 MINUTES OR LESS, INITIAL ENCOUNTER: Primary | ICD-10-CM

## 2021-06-01 DIAGNOSIS — M54.2 CERVICALGIA: ICD-10-CM

## 2021-06-01 DIAGNOSIS — S09.90XA HEAD TRAUMA, INITIAL ENCOUNTER: ICD-10-CM

## 2021-06-01 PROCEDURE — 97112 NEUROMUSCULAR REEDUCATION: CPT | Performed by: PHYSICAL THERAPIST

## 2021-06-01 NOTE — PROGRESS NOTES
Physical Therapy Treatment Note    Patient: London Calabrese                                                                                     Visit Date: 2021  :     1985    Referring practitioner:    Anthony Orona MD  Date of Initial Visit:          Type: THERAPY  Noted: 2020    Patient seen for 50 sessions    Visit Diagnoses:    ICD-10-CM ICD-9-CM   1. Concussion with loss of consciousness of 30 minutes or less, initial encounter  S06.0X1A 850.11   2. Cervicalgia  M54.2 723.1   3. Head trauma, initial encounter  S09.90XA 959.01     SUBJECTIVE     Subjective He reports he is having some dizziness today, he reports it is worse because he drove here with the [employer] wipers on. He reports slight pain in his neck but his HA's continue to get better    PAIN: pre 4/10 in his neck. post 4.5/10         OBJECTIVE     Objective      Therapeutic Exercises    08325 Comments                       Timed Minutes      Neuromuscular Reeducation     86629 Comments   Putting and tracking with rainbow ball x 8 each direction with toy   (seated rest break between switching sides)  Increased difficulty today reported from pt due to starting with increased dizziness.    Rolling solid green ball and attempting to hit it with kid putter x5 to each side                Timed Minutes 30     Manual Therapy     64246  Comments                       Timed Minutes         Therapy Education/Self Care 44997   Details:    Given symptom relief   Progress: Reinforced   Education provided to:  Patient   Level of understanding Verbalized   Timed Minutes          Total Timed Treatment:    30   mins  Total Time of Visit:             30  mins         ASSESSMENT/PLAN     GOALS  Goals                                          Progress Note due by 21   STG by: 21 Comments Status   Pt will demonstrate 50 degrees or greater (B) cervical rotaiton   Met                            LTG by: 2/3/21       Pt will demonstrate 65  degrees or greater (B) cervical rotaiton   Ongoing   Pt will score 10 or less on the NDI   Ongoing   Pt will be indpendent with HEP to include postural strengthening and vestibular exercises   Ongoing   Pt will report no dizziness with bending over for one week Continued dizziness with bending over but improved if he bends down with his head up and then looks down.  Ongoing   Pt will demonstrate SLS for 30 seconds or greater     Ongoing         Assessment/Plan     ASSESSMENT: He initially started off worse with the putting due to already being flared with his dizziness, requiring increased recovery time. He did; however, do well with the rolling and putting as he would bend over and look to his R to track the ball coming towards him and only had to look down for a brief moment to put. He struggled more when looking to the L to track the ball coming towards him. We cut his session short due to the rain about to pick back up and he feared having to call someone to come get him if he has to use his windshield wipers again.     PLAN: Continue to work on his bending over and consider functional tasks that involve such as  and putting trash bag in the can.     Signature: Pearl Malik, PTA

## 2021-06-03 ENCOUNTER — TREATMENT (OUTPATIENT)
Dept: PHYSICAL THERAPY | Facility: CLINIC | Age: 36
End: 2021-06-03

## 2021-06-03 DIAGNOSIS — M54.2 CERVICALGIA: ICD-10-CM

## 2021-06-03 DIAGNOSIS — S06.0X1A CONCUSSION WITH LOSS OF CONSCIOUSNESS OF 30 MINUTES OR LESS, INITIAL ENCOUNTER: Primary | ICD-10-CM

## 2021-06-03 DIAGNOSIS — S09.90XA HEAD TRAUMA, INITIAL ENCOUNTER: ICD-10-CM

## 2021-06-03 PROCEDURE — 97112 NEUROMUSCULAR REEDUCATION: CPT | Performed by: PHYSICAL THERAPIST

## 2021-06-03 NOTE — PROGRESS NOTES
Physical Therapy Treatment Note    Patient: London Calabrese                                                                                     Visit Date: 6/3/2021  :     1985    Referring practitioner:    Anthony Orona MD  Date of Initial Visit:          Type: THERAPY  Noted: 2020    Patient seen for 51 sessions    Visit Diagnoses:    ICD-10-CM ICD-9-CM   1. Concussion with loss of consciousness of 30 minutes or less, initial encounter  S06.0X1A 850.11   2. Cervicalgia  M54.2 723.1   3. Head trauma, initial encounter  S09.90XA 959.01     SUBJECTIVE     Subjective He reports the HA's are good, a few small ones here and there but nothing like it was. Reports a little dizziness right now, feels like when you first wake up in the morning.     PAIN: pre 3/10         OBJECTIVE     Objective      Neuromuscular Reeducation     79587 Comments   Crossover dribbles alternating first with spiral pattern ball then 55 cm SB then green ball x 40 ft each    High rebounds B lateral with green ball x 40 ft.    Crossover dribbles alt. Green ball x 40 ft.            Timed Minutes 30       Therapy Education/Self Care 99939   Details: Using visual stimuli from sports , movies, and T.V . programs   Given Home Exercise Program   Progress: New and Reinforced   Education provided to:  Patient   Level of understanding Verbalized   Timed Minutes          Total Timed Treatment:     30   mins  Total Time of Visit:             30   mins         ASSESSMENT/PLAN     GOALS  Goals                                          Progress Note due by 21   STG by: 21 Comments Status   Pt will demonstrate 50 degrees or greater (B) cervical rotaiton   Met                            LTG by: 2/3/21       Pt will demonstrate 65 degrees or greater (B) cervical rotaiton   Ongoing   Pt will score 10 or less on the NDI   Ongoing   Pt will be indpendent with HEP to include postural strengthening and vestibular exercises   Ongoing   Pt will  report no dizziness with bending over for one week  Ongoing   Pt will demonstrate SLS for 30 seconds or greater     Ongoing         Assessment/Plan     ASSESSMENT: He was running late today thus leading to a truncated treatment session. However, I did advise and progress his HEP today by giving him examples of visual stimuli that would continue his progression. He revealed to me today that he has been avoiding T.V. programs all together for an extended period of time as of late due to fear of increased dizziness.     PLAN: Take into consideration the weather as there are significant chances of rain next week and he has a history of driving in that type of situation makes his dizziness worse and difficult.    Signature: Eusebio Gonzalez, PTA

## 2021-06-08 ENCOUNTER — TREATMENT (OUTPATIENT)
Dept: PHYSICAL THERAPY | Facility: CLINIC | Age: 36
End: 2021-06-08

## 2021-06-08 DIAGNOSIS — M54.2 CERVICALGIA: ICD-10-CM

## 2021-06-08 DIAGNOSIS — S06.0X1A CONCUSSION WITH LOSS OF CONSCIOUSNESS OF 30 MINUTES OR LESS, INITIAL ENCOUNTER: Primary | ICD-10-CM

## 2021-06-08 DIAGNOSIS — S09.90XA HEAD TRAUMA, INITIAL ENCOUNTER: ICD-10-CM

## 2021-06-08 PROCEDURE — 97140 MANUAL THERAPY 1/> REGIONS: CPT | Performed by: PHYSICAL THERAPIST

## 2021-06-08 PROCEDURE — 97112 NEUROMUSCULAR REEDUCATION: CPT | Performed by: PHYSICAL THERAPIST

## 2021-06-08 NOTE — PROGRESS NOTES
Physical Therapy Treatment Note    Patient: London Calabrese                                                                                     Visit Date: 2021  :     1985    Referring practitioner:    Anthony Orona MD  Date of Initial Visit:          Type: THERAPY  Noted: 2020    Patient seen for 52 sessions    Visit Diagnoses:    ICD-10-CM ICD-9-CM   1. Concussion with loss of consciousness of 30 minutes or less, initial encounter  S06.0X1A 850.11   2. Cervicalgia  M54.2 723.1   3. Head trauma, initial encounter  S09.90XA 959.01     SUBJECTIVE     Subjective He reports a small HA and they are improved, still has dizziness.     PAIN: pre and post 4 /10         OBJECTIVE     Objective      Neuromuscular Reeducation     38384 Comments   Visual tracking and convergence in sitting with tounge blade    Toss and catch with orange ball all planes progressed to closing distance and catch on a bounce    Simultaneous bounce/catch with green and orange ball    Bouncing and catching small blue ball     Kick ball with all the geometric pattern balls    Timed Minutes 30     Manual Therapy     06693  Comments   Cervical suboccipital releases, manual traction, and traction with B lateral bends Grade 3 sustained HL                           Timed Minutes 15         Therapy Education/Self Care 36795   Details:    Given symptom relief   Progress: Reinforced   Education provided to:  Patient   Level of understanding Verbalized   Timed Minutes          Total Timed Treatment:     45   mins  Total Time of Visit:             45   mins         ASSESSMENT/PLAN     GOALS  Goals                                          Progress Note due by 21   STG by: 21 Comments Status   Pt will demonstrate 50 degrees or greater (B) cervical rotaiton   Met                            LTG by: 2/3/21       Pt will demonstrate 65 degrees or greater (B) cervical rotaiton   Ongoing   Pt will score 10 or less on the NDI   Ongoing   Pt  will be indpendent with HEP to include postural strengthening and vestibular exercises   Ongoing   Pt will report no dizziness with bending over for one week  he had to bend over several times today to field or retrieve a ball. Ongoing   Pt will demonstrate SLS for 30 seconds or greater     Ongoing         Assessment/Plan     ASSESSMENT: He did very well with all the neuromuscular re-education activities today and exhibited better hand and eye coordination. However, he was quite hesitant to move his feet for fielding today especially moving to his L.    PLAN: Consider some dynamic balance challenges with lateral movement with and without visual tracking activities.    Signature: Eusebio Gonzalez, PTA

## 2021-06-10 ENCOUNTER — TELEPHONE (OUTPATIENT)
Dept: PHYSICAL THERAPY | Facility: CLINIC | Age: 36
End: 2021-06-10

## 2021-06-15 ENCOUNTER — TREATMENT (OUTPATIENT)
Dept: PHYSICAL THERAPY | Facility: CLINIC | Age: 36
End: 2021-06-15

## 2021-06-15 DIAGNOSIS — S06.0X1A CONCUSSION WITH LOSS OF CONSCIOUSNESS OF 30 MINUTES OR LESS, INITIAL ENCOUNTER: Primary | ICD-10-CM

## 2021-06-15 DIAGNOSIS — S09.90XA HEAD TRAUMA, INITIAL ENCOUNTER: ICD-10-CM

## 2021-06-15 DIAGNOSIS — M54.2 CERVICALGIA: ICD-10-CM

## 2021-06-15 PROCEDURE — 97530 THERAPEUTIC ACTIVITIES: CPT | Performed by: PHYSICAL THERAPIST

## 2021-06-15 PROCEDURE — 97112 NEUROMUSCULAR REEDUCATION: CPT | Performed by: PHYSICAL THERAPIST

## 2021-06-15 NOTE — PROGRESS NOTES
Progress Note Addendum      Patient: London Calabrese           : 1985  Visit Date: 6/15/2021  Referring practitioner: Anthony Orona MD  Date of Initial Visit: Type: THERAPY  Noted: 2020  Patient seen for 53 sessions  Visit Diagnoses:    ICD-10-CM ICD-9-CM   1. Concussion with loss of consciousness of 30 minutes or less, initial encounter  S06.0X1A 850.11   2. Cervicalgia  M54.2 723.1   3. Head trauma, initial encounter  S09.90XA 959.01       PT G-Codes  Outcome Measure Options: Neck Disability Index (NDI)  Neck Disability Index Score: 29  Clinical Progress: improved  Home Program Compliance: Yes  Treatment has included: therapeutic exercise, neuromuscular re-education, manual therapy, therapeutic activity, gait training and dry needling  Progress toward previous goals: Partially Met  Prognosis to achieve goals: good    Subjective     Objective     Assessment & Plan     Assessment  Impairments: abnormal coordination, abnormal gait, activity intolerance and impaired balance  Prognosis: good  Functional Limitations: sleeping, walking, standing and stooping  Plan  Planned therapy interventions: manual therapy, balance/weight-bearing training, body mechanics training, neuromuscular re-education, motor coordination training, postural training, fine motor coordination training, soft tissue mobilization, spinal/joint mobilization, strengthening, functional ROM exercises, flexibility, gait training, stretching, therapeutic activities, home exercise program, IADL retraining and joint mobilization  Frequency: 2x week  Duration in visits: 8  Duration in weeks: 4  Treatment plan discussed with: patient      Goals                                          Progress Note due by 7/15/21 Recert 21   STG by: 3 weeks Comments Status   Pt will demonstrate 50 degrees or greater (B) cervical rotaiton   Met                            LTG by: 8 weeks       Pt will demonstrate 65 degrees or greater (B) cervical rotaiton  R  rotation 65 degrees L rotation 55 degrees AROM Ongoing   Pt will score 10 or less on the NDI  29 today Ongoing   Pt will be indpendent with HEP to include postural strengthening and vestibular exercises  reinforced today Ongoing   Pt will report no dizziness with bending over for one week  still occurs and increases the longer he is bent over Ongoing   Pt will demonstrate SLS for 30 seconds or greater   8 sec L LE 9 sec R LE  Ongoing   Pt to score <36 on the Dizziness Handicap Index  New         I have reviewed the progress note information provided by Eusebio Gonzalez PTA, and I concur with the findings.    Juanjose Turcios, PT DPT  Physical Therapist

## 2021-06-15 NOTE — PROGRESS NOTES
"Physical Therapy 30 Day Progress Note    Patient: London Calabrese                                                                                     Visit Date: 6/15/2021  :     1985    Referring practitioner:    Anthony Orona MD  Date of Initial Visit:          Type: THERAPY  Noted: 2020    Patient seen for 53 sessions    Visit Diagnoses:    ICD-10-CM ICD-9-CM   1. Concussion with loss of consciousness of 30 minutes or less, initial encounter  S06.0X1A 850.11   2. Cervicalgia  M54.2 723.1   3. Head trauma, initial encounter  S09.90XA 959.01     SUBJECTIVE     Subjective  He reports his HA's have been \"moderate\" and the medicine is helping. Still has dizziness with looking down that gets worse over time    PAIN: pre 5/10 post 5/10    PT G-Codes  Outcome Measure Options: Neck Disability Index (NDI)  Neck Disability Index Score: 29    OBJECTIVE     Objective      Therapeutic Activities    42551 Comments   Reviewed all goals for progress notes (see goals section)    Obtained an updated NDI (see outcome measures section)    Obtained an updated Dizziness Handicap Inventory Today's score 62           Timed Minutes 25     Neuromuscular Reeducation     97086 Comments   Fwd stepping, B side stepping in agility ladder    Walking dribbling 65 cm SB x 100 ft B UE unilaterally    Walking kickball with weighted 75 cm SB     B side step toss and catch in agility ladder with green ball    B side step with fast toss and catch with green ball    B side stepping with bounce pass and catch with green ball                 Timed Minutes 30       Therapy Education/Self Care 38507   Details:    Given symptom relief   Progress: Reinforced   Education provided to:  Patient   Level of understanding Verbalized   Timed Minutes          Total Timed Treatment:     55  mins  Total Time of Visit:             55   mins         ASSESSMENT/PLAN     GOALS  Goals                                          Progress Note due by 7/15/21 Recert " 7/20/21   STG by: 3 weeks Comments Status   Pt will demonstrate 50 degrees or greater (B) cervical rotaiton   Met                            LTG by: 8 weeks       Pt will demonstrate 65 degrees or greater (B) cervical rotaiton  R rotation 65 degrees L rotation 55 degrees AROM Ongoing   Pt will score 10 or less on the NDI  29 today Ongoing   Pt will be indpendent with HEP to include postural strengthening and vestibular exercises  reinforced today Ongoing   Pt will report no dizziness with bending over for one week  still occurs and increases the longer he is bent over Ongoing   Pt will demonstrate SLS for 30 seconds or greater   8 sec L LE 9 sec R LE  Ongoing         Assessment/Plan     ASSESSMENT: His HA's have improved per his reports but he did have a two point increase in his NDI score as compared to 5/18/21. Due to a clerical error I was unable to find his original score on the Dizzness Handicap Inventory from 5/18/21 for a comparison to today's but I will continue searching for the original. His recent BITS testing revealed improvement as his hand and eye coordination and response time both decreased significantly.    PLAN: Continue utilizing the multiple geometric pattern balls as well as the agility ladder and the BITS and monitor his blink rate as well as his other symptoms.    Signature: Eusebio Gonzalez, PTA

## 2021-06-17 ENCOUNTER — TREATMENT (OUTPATIENT)
Dept: PHYSICAL THERAPY | Facility: CLINIC | Age: 36
End: 2021-06-17

## 2021-06-17 DIAGNOSIS — S09.90XA HEAD TRAUMA, INITIAL ENCOUNTER: ICD-10-CM

## 2021-06-17 DIAGNOSIS — M54.2 CERVICALGIA: ICD-10-CM

## 2021-06-17 DIAGNOSIS — S06.0X1A CONCUSSION WITH LOSS OF CONSCIOUSNESS OF 30 MINUTES OR LESS, INITIAL ENCOUNTER: Primary | ICD-10-CM

## 2021-06-17 PROCEDURE — 97112 NEUROMUSCULAR REEDUCATION: CPT | Performed by: PHYSICAL THERAPIST

## 2021-06-17 NOTE — PROGRESS NOTES
"Physical Therapy 30 Day Progress Note    Patient: London Calabrese                                                                                     Visit Date: 2021  :     1985    Referring practitioner:    Anthony Orona MD  Date of Initial Visit:          Type: THERAPY  Noted: 2020    Patient seen for 54 sessions    Visit Diagnoses:    ICD-10-CM ICD-9-CM   1. Concussion with loss of consciousness of 30 minutes or less, initial encounter  S06.0X1A 850.11   2. Cervicalgia  M54.2 723.1   3. Head trauma, initial encounter  S09.90XA 959.01     SUBJECTIVE     Subjective  Pt reports a small headache and dizziness with a \"cloudy feeling\".    PAIN: pre 510 post 5/10         OBJECTIVE     Objective        Neuromuscular Reeducation     21640 Comments   Tall kneeling with therapist throwing tie-dye  ball from behind and pt catching ball    Side stepping small green/yellow multipattern ball toss/catch overhead against the wall     Walking side to side ball catch and underhand toss off wall    Walking side to side overhead ball catch and throw        Timed Minutes 45       Therapy Education/Self Care 43206   Details:    Given symptom relief   Progress: Reinforced   Education provided to:  Patient   Level of understanding Verbalized   Timed Minutes          Total Timed Treatment:     45  mins  Total Time of Visit:             45   mins         ASSESSMENT/PLAN     GOALS            Goals                                          Progress Note due by 7/15/21 Recert 21   STG by: 3 weeks Comments Status   Pt will demonstrate 50 degrees or greater (B) cervical rotaiton   Met                            LTG by: 8 weeks       Pt will demonstrate 65 degrees or greater (B) cervical rotaiton  R rotation 65 degrees L rotation 55 degrees AROM Ongoing   Pt will score 10 or less on the NDI  29 today Ongoing   Pt will be indpendent with HEP to include postural strengthening and vestibular exercises  reinforced today " Ongoing   Pt will report no dizziness with bending over for one week An instance  Ongoing   Pt will demonstrate SLS for 30 seconds or greater   8 sec L LE 9 sec R LE  Ongoing   Pt to score <36 on the Dizziness Handicap Index   New         Assessment/Plan     ASSESSMENT: We utilized ball tosses and heads turns today to address his movement and hand eye coordination. Overall he did very well but still has the most trouble going to the left and with activity overhead and when looking down. His performance continues to improve with practice which is very encouraging.     PLAN: Continue to work on challenging hand eye coordination tasks along with dynamic balance activities.    Signature: Juanjose Turcios, PT DPT

## 2021-06-24 ENCOUNTER — TREATMENT (OUTPATIENT)
Dept: PHYSICAL THERAPY | Facility: CLINIC | Age: 36
End: 2021-06-24

## 2021-06-24 ENCOUNTER — TELEPHONE (OUTPATIENT)
Dept: NEUROSURGERY | Age: 36
End: 2021-06-24

## 2021-06-24 DIAGNOSIS — S06.0X1A CONCUSSION WITH LOSS OF CONSCIOUSNESS OF 30 MINUTES OR LESS, INITIAL ENCOUNTER: Primary | ICD-10-CM

## 2021-06-24 DIAGNOSIS — M54.2 CERVICALGIA: ICD-10-CM

## 2021-06-24 DIAGNOSIS — S09.90XA HEAD TRAUMA, INITIAL ENCOUNTER: ICD-10-CM

## 2021-06-24 PROCEDURE — 97112 NEUROMUSCULAR REEDUCATION: CPT | Performed by: PHYSICAL THERAPIST

## 2021-06-24 NOTE — PROGRESS NOTES
Physical Therapy Treatment Note    Patient: London Calabrese                                                                                     Visit Date: 2021  :     1985    Referring practitioner:    Anthony Orona MD  Date of Initial Visit:          Type: THERAPY  Noted: 2020    Patient seen for 55 sessions    Visit Diagnoses:    ICD-10-CM ICD-9-CM   1. Concussion with loss of consciousness of 30 minutes or less, initial encounter  S06.0X1A 850.11   2. Cervicalgia  M54.2 723.1   3. Head trauma, initial encounter  S09.90XA 959.01     SUBJECTIVE     Subjective He reports that his dizziness is stepping it up a notch. He's been watching more T.V. and he also notices that his neck is  Stiffening up more especially in the mornings.     PAIN: 6/10         OBJECTIVE     Objective      Neuromuscular Reeducation     35436 Comments   Progressive toss and catch with orange ball, spiral pattern, kaiden dye, and small black and white ball. Then same with NS on blue foam then same on BOSU blue side up    Varied toss and catch with spiral print ball tracking from dark to dimly lit area then B kickball same                 Timed Minutes 45       Therapy Education/Self Care 05373   Details:    Given Home Exercise Program   Progress: Reinforced   Education provided to:  Patient   Level of understanding Verbalized   Timed Minutes          Total Timed Treatment:     45   mins  Total Time of Visit:             45   mins         ASSESSMENT/PLAN     GOALS  Goals                                          Progress Note due by 7/15/21 Recert 21   STG by: 3 weeks Comments Status   Pt will demonstrate 50 degrees or greater (B) cervical rotaiton   Met                            LTG by: 8 weeks       Pt will demonstrate 65 degrees or greater (B) cervical rotaiton  R rotation 65 degrees L rotation 55 degrees AROM Ongoing   Pt will score 10 or less on the NDI  29 today Ongoing   Pt will be indpendent with HEP to include  postural strengthening and vestibular exercises  reinforced today Ongoing   Pt will report no dizziness with bending over for one week An instance  Ongoing   Pt will demonstrate SLS for 30 seconds or greater   8 sec L LE 9 sec R LE  Ongoing   Pt to score <36 on the Dizziness Handicap Index            Assessment/Plan     ASSESSMENT: He was able to continue the POC progression to include visual tracking the geometrical pattern balls in a dimly light area today. He continues to have HA pain and dizziness that persists.    PLAN: Consider utilizing the BITS and have him perform balance and memory activities.    Signature: Eusebio Gonzalez, PTA

## 2021-06-25 DIAGNOSIS — G43.009 MIGRAINE WITHOUT AURA AND WITHOUT STATUS MIGRAINOSUS, NOT INTRACTABLE: Primary | ICD-10-CM

## 2021-06-25 DIAGNOSIS — S09.90XA HEAD TRAUMA, INITIAL ENCOUNTER: ICD-10-CM

## 2021-06-25 DIAGNOSIS — M54.2 CERVICALGIA: ICD-10-CM

## 2021-06-25 DIAGNOSIS — G44.319 ACUTE POST-TRAUMATIC HEADACHE, NOT INTRACTABLE: ICD-10-CM

## 2021-06-25 RX ORDER — BUTALBITAL, ACETAMINOPHEN AND CAFFEINE 50; 325; 40 MG/1; MG/1; MG/1
TABLET ORAL
Qty: 84 TABLET | OUTPATIENT
Start: 2021-06-25

## 2021-06-25 RX ORDER — CYCLOBENZAPRINE HCL 10 MG
TABLET ORAL
Qty: 90 TABLET | Refills: 0 | OUTPATIENT
Start: 2021-06-25

## 2021-06-25 RX ORDER — TOPIRAMATE 100 MG/1
100 CAPSULE, EXTENDED RELEASE ORAL DAILY
Qty: 30 CAPSULE | Refills: 3 | Status: SHIPPED | OUTPATIENT
Start: 2021-06-25 | End: 2021-07-21 | Stop reason: SDUPTHER

## 2021-06-25 RX ORDER — DICLOFENAC SODIUM 75 MG/1
TABLET, DELAYED RELEASE ORAL
Qty: 60 TABLET | Refills: 0 | OUTPATIENT
Start: 2021-06-25

## 2021-06-25 NOTE — TELEPHONE ENCOUNTER
Requested Prescriptions     Pending Prescriptions Disp Refills    TROKENDI  MG CP24 30 capsule 3     Sig: Take 100 mg by mouth daily       Last Office Visit: 4/21/2021  Next Office Visit: 7/21/2021  Last Medication Refill: 05/10/2021

## 2021-06-25 NOTE — TELEPHONE ENCOUNTER
PATIENT IS SEEING [facility] AND HAS BEEN REFERRED TO ANOTHER NEUROSURGERY GROUP.    HE NO LONGER SEES ANYONE IN OUR PRACTICE AND THEREFORE NO REFILLS WILL BE GIVEN.      HEIDE SALEEM CMA      I called [facility] and spoke w/Gladys.  She is going to contact the patient to see if he wants these sent to [facility].    heide saleem CMA

## 2021-06-29 ENCOUNTER — TREATMENT (OUTPATIENT)
Dept: PHYSICAL THERAPY | Facility: CLINIC | Age: 36
End: 2021-06-29

## 2021-06-29 DIAGNOSIS — S09.90XA HEAD TRAUMA, INITIAL ENCOUNTER: ICD-10-CM

## 2021-06-29 DIAGNOSIS — S06.0X1A CONCUSSION WITH LOSS OF CONSCIOUSNESS OF 30 MINUTES OR LESS, INITIAL ENCOUNTER: Primary | ICD-10-CM

## 2021-06-29 DIAGNOSIS — M54.2 CERVICALGIA: ICD-10-CM

## 2021-06-29 PROCEDURE — 97112 NEUROMUSCULAR REEDUCATION: CPT | Performed by: PHYSICAL THERAPIST

## 2021-06-29 NOTE — PROGRESS NOTES
"Physical Therapy Treatment Note    Patient: London Calabrese                                                                                     Visit Date: 2021  :     1985    Referring practitioner:    Anthony Orona MD  Date of Initial Visit:          Type: THERAPY  Noted: 2020    Patient seen for 56 sessions    Visit Diagnoses:    ICD-10-CM ICD-9-CM   1. Concussion with loss of consciousness of 30 minutes or less, initial encounter  S06.0X1A 850.11   2. Cervicalgia  M54.2 723.1   3. Head trauma, initial encounter  S09.90XA 959.01     SUBJECTIVE     Subjective He reports after the last session he had a lot of dizziness for a couple of hour and then it got better. Yesterday was the first day he didn't have a HA. He has some dizziness right now but no HA.    PAIN: 0/10         OBJECTIVE     Objective      Neuromuscular Reeducation     92947 Comments   B lateral stepping bounce pass and catch, shovel passes, and \"air ball\" catches with kaiden dye ball and rainbow ball    Progressive close the distance throw and catch with black striped ball then same tracking from dimly lit to dark                Timed Minutes 43       Therapy Education/Self Care 55820   Details:    Given symptom relief   Progress: Reinforced   Education provided to:  Patient   Level of understanding Verbalized   Timed Minutes          Total Timed Treatment:     43   mins  Total Time of Visit:             43   mins         ASSESSMENT/PLAN     GOALS    Goals                                          Progress Note due by 7/15/21 Recert 21    STG by: 3 weeks Comments Status Date   Pt will demonstrate 50 degrees or greater (B) cervical rotaiton   Met                                LTG by: 8 weeks        Pt will demonstrate 65 degrees or greater (B) cervical rotaiton  B rotation 65 degrees Met 21   Pt will score 10 or less on the NDI  29 today Ongoing    Pt will be indpendent with HEP to include postural strengthening and " vestibular exercises  reinforced today Ongoing    Pt will report no dizziness with bending over for one week An instance  Ongoing    Pt will demonstrate SLS for 30 seconds or greater   8 sec L LE 9 sec R LE  Ongoing    Pt to score <36 on the Dizziness Handicap Index    Ongoing        Assessment/Plan     ASSESSMENT: He has met his LTG for B cervical rotation as of today which is another sign that we are headed in the right direction. We were able to progress the ball toss and catches with increased speed and I varied direction and spin as well and he did not have as many LOB's today.    PLAN: Continue to utilize the geometric pattern balls to stimulate and challenge his VOR and tracking.    Signature: Eusebio Gonzalez, PTA

## 2021-07-01 ENCOUNTER — TELEPHONE (OUTPATIENT)
Dept: NEUROSURGERY | Age: 36
End: 2021-07-01

## 2021-07-01 ENCOUNTER — TREATMENT (OUTPATIENT)
Dept: PHYSICAL THERAPY | Facility: CLINIC | Age: 36
End: 2021-07-01

## 2021-07-01 DIAGNOSIS — S09.90XA HEAD TRAUMA, INITIAL ENCOUNTER: ICD-10-CM

## 2021-07-01 DIAGNOSIS — G43.009 MIGRAINE WITHOUT AURA AND WITHOUT STATUS MIGRAINOSUS, NOT INTRACTABLE: ICD-10-CM

## 2021-07-01 DIAGNOSIS — M54.2 CERVICALGIA: ICD-10-CM

## 2021-07-01 DIAGNOSIS — S06.0X1A CONCUSSION WITH LOSS OF CONSCIOUSNESS OF 30 MINUTES OR LESS, INITIAL ENCOUNTER: Primary | ICD-10-CM

## 2021-07-01 PROCEDURE — 97112 NEUROMUSCULAR REEDUCATION: CPT | Performed by: PHYSICAL THERAPIST

## 2021-07-01 NOTE — PROGRESS NOTES
Physical Therapy Treatment Note    Patient: London Calabrese                                                                                     Visit Date: 2021  :     1985    Referring practitioner:    Anthony Orona MD  Date of Initial Visit:          Type: THERAPY  Noted: 2020    Patient seen for 57 sessions    Visit Diagnoses:    ICD-10-CM ICD-9-CM   1. Concussion with loss of consciousness of 30 minutes or less, initial encounter  S06.0X1A 850.11   2. Cervicalgia  M54.2 723.1   3. Head trauma, initial encounter  S09.90XA 959.01     SUBJECTIVE     Subjective He reports the HA's are better but still has dizziness, denies pain right now    PAIN: 0/10         OBJECTIVE     Objective      Neuromuscular Reeducation     80314 Comments   Toss and catch progressed with spiral print balls and varied bounce pass, varied direction and speed as well as release point the progressed same with blue rubber ball then same standing on trampoline.    Competition mode @ [employer] with Keith the tech numbers, letters, words, numbers and letters                Timed Minutes 40       Therapy Education/Self Care 48455   Details:    Given symptom relief   Progress: Reinforced   Education provided to:  Patient   Level of understanding Verbalized   Timed Minutes          Total Timed Treatment:     40   mins  Total Time of Visit:             40   mins         ASSESSMENT/PLAN     GOALS    Goals                                          Progress Note due by 7/15/21 Recert 21     STG by: 3 weeks Comments Status Date   Pt will demonstrate 50 degrees or greater (B) cervical rotaiton   Met                                    LTG by: 8 weeks         Pt will demonstrate 65 degrees or greater (B) cervical rotaiton  B rotation 65 degrees Met 21   Pt will score 10 or less on the NDI  29 today Ongoing     Pt will be indpendent with HEP to include postural strengthening and vestibular exercises  reinforced today Ongoing     Pt will  report no dizziness with bending over for one week Slight increase with activities today Ongoing  7/1/21   Pt will demonstrate SLS for 30 seconds or greater   8 sec L LE 9 sec R LE  Ongoing     Pt to score <36 on the Dizziness Handicap Index    Ongoing         Assessment/Plan     ASSESSMENT: With the progression of activities today he did have some increased dizziness and blink rate. However, today I would occasionally speed up the rate of throws both in velocity and rate of frequency. He exhibited increased reaction time and I challenged him by trying to get the ball by him often.     PLAN: Consider some further testing and analysis on the BITS machine and/or [employer].    Signature: Eusebio Gonzalez, PTA

## 2021-07-02 NOTE — TELEPHONE ENCOUNTER
Called vishal and patient left voicemails with call back numbers advising he an come by office to  trokendi samples.

## 2021-07-06 ENCOUNTER — TREATMENT (OUTPATIENT)
Dept: PHYSICAL THERAPY | Facility: CLINIC | Age: 36
End: 2021-07-06

## 2021-07-06 DIAGNOSIS — S09.90XA HEAD TRAUMA, INITIAL ENCOUNTER: ICD-10-CM

## 2021-07-06 DIAGNOSIS — S06.0X1A CONCUSSION WITH LOSS OF CONSCIOUSNESS OF 30 MINUTES OR LESS, INITIAL ENCOUNTER: Primary | ICD-10-CM

## 2021-07-06 DIAGNOSIS — M54.2 CERVICALGIA: ICD-10-CM

## 2021-07-06 PROCEDURE — 97110 THERAPEUTIC EXERCISES: CPT | Performed by: PHYSICAL THERAPIST

## 2021-07-06 PROCEDURE — 97112 NEUROMUSCULAR REEDUCATION: CPT | Performed by: PHYSICAL THERAPIST

## 2021-07-06 NOTE — PROGRESS NOTES
Physical Therapy Treatment Note    Patient: London Calabrese                                                                                     Visit Date: 2021  :     1985    Referring practitioner:    Anthony Orona MD  Date of Initial Visit:          Type: THERAPY  Noted: 2020    Patient seen for 58 sessions    Visit Diagnoses:    ICD-10-CM ICD-9-CM   1. Concussion with loss of consciousness of 30 minutes or less, initial encounter  S06.0X1A 850.11   2. Cervicalgia  M54.2 723.1   3. Head trauma, initial encounter  S09.90XA 959.01     SUBJECTIVE     Subjective He denies HA pain, reports continued dizziness, attempted to watch fireworks over the weekend but both the noise and visual effects were bothering him.     PAIN: pre 0/10         OBJECTIVE     Objective      Therapeutic Exercises    12043 Comments   Unicam seat#7 res 3 x 8 min                    Timed Minutes 8     Neuromuscular Reeducation     53021 Comments   Visual pursuit R to L then L to R on the BITS in NS with lights off    User paced visual scanning, sequencing array numbers, letters,sequencing rotator disc numbers, letters in NS with lights off @ BITS then progressed each with central fixation    B lateral in and out steps with agility ladder    Bounce toss and catch with occasional fast pass with tactile med ball        Timed Minutes 37       Therapy Education/Self Care 59199   Details:    Given symptom relief   Progress: Reinforced   Education provided to:  Patient   Level of understanding Verbalized   Timed Minutes          Total Timed Treatment:     45   mins  Total Time of Visit:             45   mins         ASSESSMENT/PLAN     GOALS    Goals                                          Progress Note due by 7/15/21 Recert 21     STG by: 3 weeks Comments Status Date   Pt will demonstrate 50 degrees or greater (B) cervical rotaiton   Met                                    LTG by: 8 weeks         Pt will demonstrate 65 degrees  or greater (B) cervical rotaiton  B rotation 65 degrees Met 6/29/21   Pt will score 10 or less on the NDI  23 today Ongoing  7/6/21   Pt will be indpendent with HEP to include postural strengthening and vestibular exercises  reinforced today Ongoing     Pt will report no dizziness with bending over for one week Slight increase with activities today Ongoing  7/1/21   Pt will demonstrate SLS for 30 seconds or greater   8 sec L LE 9 sec R LE  Ongoing     Pt to score <36 on the Dizziness Handicap Index    Ongoing         Assessment/Plan     ASSESSMENT: I progressed his challenges by having him perform varied tasks at the BITS in narrow stance with the lights out today. His reaction time was slightly reduced due to the lights off plus I started with visual pursuit R to L which increased his dizziness.     PLAN: Review all goals for a recertification and continue increasing the visual stimuli. Consider using the geometrical print balls with activities in quad-ped.    Signature: Eusebio Gonzalez, PTA

## 2021-07-08 ENCOUNTER — TREATMENT (OUTPATIENT)
Dept: PHYSICAL THERAPY | Facility: CLINIC | Age: 36
End: 2021-07-08

## 2021-07-08 DIAGNOSIS — M54.2 CERVICALGIA: ICD-10-CM

## 2021-07-08 DIAGNOSIS — S09.90XA HEAD TRAUMA, INITIAL ENCOUNTER: ICD-10-CM

## 2021-07-08 DIAGNOSIS — S06.0X1A CONCUSSION WITH LOSS OF CONSCIOUSNESS OF 30 MINUTES OR LESS, INITIAL ENCOUNTER: Primary | ICD-10-CM

## 2021-07-08 PROCEDURE — 97112 NEUROMUSCULAR REEDUCATION: CPT | Performed by: PHYSICAL THERAPIST

## 2021-07-08 PROCEDURE — 97110 THERAPEUTIC EXERCISES: CPT | Performed by: PHYSICAL THERAPIST

## 2021-07-08 NOTE — PROGRESS NOTES
Physical Therapy Treatment Note    Patient: London Calabrese                                                                                     Visit Date: 2021  :     1985    Referring practitioner:    Anthony Orona MD  Date of Initial Visit:          Type: THERAPY  Noted: 2020    Patient seen for 59 sessions    Visit Diagnoses:  No diagnosis found.  SUBJECTIVE     Subjective He denies HA pain reports he was very dizzy after the last session    PAIN: 0/10         OBJECTIVE     Objective      Therapeutic Exercises    04409 Comments   Unicam seat#7 res 3 x 8 min                             Timed Minutes 8     Neuromuscular Reeducation     85392 Comments   Toss and catch with varied throws with geographic prints standing on trampoline progressed with increasing speed and closing the distance and moving to his L     SLS with STT support on blue foam performing toss and catch with small blue ball    Walking dribbling small blue ball RH then LH 40 ft.            Timed Minutes 37       Therapy Education/Self Care 91654   Details:    Given symptom relief   Progress: Reinforced   Education provided to:  Patient   Level of understanding Verbalized   Timed Minutes          Total Timed Treatment:     45   mins  Total Time of Visit:             45   mins         ASSESSMENT/PLAN     GOALS    Goals                                          Progress Note due by 7/15/21 Recert 21     STG by: 3 weeks Comments Status Date   Pt will demonstrate 50 degrees or greater (B) cervical rotaiton   Met                                    LTG by: 8 weeks         Pt will demonstrate 65 degrees or greater (B) cervical rotaiton  B rotation 65 degrees Met 21   Pt will score 10 or less on the NDI  23 today Ongoing  21   Pt will be indpendent with HEP to include postural strengthening and vestibular exercises  reinforced today Ongoing     Pt will report no dizziness with bending over for one week Slight increase with  activities today Ongoing  7/1/21   Pt will demonstrate SLS for 30 seconds or greater   had him perform SLS with STT while performing throw and catch  Ongoing  7/8/21   Pt to score <36 on the Dizziness Handicap Index    Ongoing         Assessment/Plan     ASSESSMENT: I increased the variation of speed and direction of throw more today and also started moving to his L more and at first this was very challenging for him but over time he got more proficient and his blink rate decreased.    PLAN: Consider having him catch a bounce pass with one hand while simultaneously throwing with the other hand.    Signature: Eusebio Gonzalez, PTA

## 2021-07-13 ENCOUNTER — TREATMENT (OUTPATIENT)
Dept: PHYSICAL THERAPY | Facility: CLINIC | Age: 36
End: 2021-07-13

## 2021-07-13 DIAGNOSIS — S06.0X1A CONCUSSION WITH LOSS OF CONSCIOUSNESS OF 30 MINUTES OR LESS, INITIAL ENCOUNTER: Primary | ICD-10-CM

## 2021-07-13 DIAGNOSIS — M54.2 CERVICALGIA: ICD-10-CM

## 2021-07-13 DIAGNOSIS — S09.90XA HEAD TRAUMA, INITIAL ENCOUNTER: ICD-10-CM

## 2021-07-13 PROCEDURE — 97112 NEUROMUSCULAR REEDUCATION: CPT | Performed by: PHYSICAL THERAPIST

## 2021-07-13 NOTE — PROGRESS NOTES
"Physical Therapy Treatment Note    Patient: London Calabrese                                                                                     Visit Date: 2021  :     1985    Referring practitioner:    Anthony Orona MD  Date of Initial Visit:          Type: THERAPY  Noted: 2020    Patient seen for 60 sessions    Visit Diagnoses:    ICD-10-CM ICD-9-CM   1. Concussion with loss of consciousness of 30 minutes or less, initial encounter  S06.0X1A 850.11   2. Cervicalgia  M54.2 723.1   3. Head trauma, initial encounter  S09.90XA 959.01     SUBJECTIVE     Subjective No pain or HA, reports dizziness \"about the same.\"    PAIN: 0/10    PT G-Codes  Outcome Measure Options: Neck Disability Index (NDI)  Neck Disability Index Score: 11    OBJECTIVE     Objective      Neuromuscular Reeducation     73966 Comments   Quad ped catch and roll with rain bow and spiral print balls progressed with closing the distance then same with bounce/catch     Simultaneous throw while fielding a bounce pass with tactile med and orange balls, then reverse     Soft toss and catch with black and white ball in a dimly lit room            Timed Minutes 31       Therapy Education/Self Care 33038   Details:    Given symptom relief   Progress: Reinforced   Education provided to:  Patient   Level of understanding Verbalized   Timed Minutes          Total Timed Treatment:     31   mins  Total Time of Visit:             31   mins         ASSESSMENT/PLAN     GOALS  Goals                                          Progress Note due by 7/15/21 Recert 21     STG by: 3 weeks Comments Status Date   Pt will demonstrate 50 degrees or greater (B) cervical rotaiton   Met                                    LTG by: 8 weeks         Pt will demonstrate 65 degrees or greater (B) cervical rotaiton  B rotation 65 degrees Met 21   Pt will score 10 or less on the NDI 11 today Ongoing  21   Pt will be indpendent with HEP to include postural " strengthening and vestibular exercises  reinforced today Ongoing     Pt will report no dizziness with bending over for one week Slight increase with activities today Ongoing  7/1/21   Pt will demonstrate SLS for 30 seconds or greater   had him perform SLS with STT while performing throw and catch  Ongoing  7/8/21   Pt to score <36 on the Dizziness Handicap Index    Ongoing           Assessment/Plan     ASSESSMENT: He was late for today's session which truncated our treatment time this date. He has had a significant improvement in his HA and neck pain symptoms.    PLAN: Review all goals for progress note and continue to challenge his visual tracking and vestibular system     Signature: Eusebio Gonzalez, PTA

## 2021-07-15 ENCOUNTER — TREATMENT (OUTPATIENT)
Dept: PHYSICAL THERAPY | Facility: CLINIC | Age: 36
End: 2021-07-15

## 2021-07-15 DIAGNOSIS — M54.2 CERVICALGIA: ICD-10-CM

## 2021-07-15 DIAGNOSIS — S06.0X1A CONCUSSION WITH LOSS OF CONSCIOUSNESS OF 30 MINUTES OR LESS, INITIAL ENCOUNTER: Primary | ICD-10-CM

## 2021-07-15 DIAGNOSIS — S09.90XA HEAD TRAUMA, INITIAL ENCOUNTER: ICD-10-CM

## 2021-07-15 PROCEDURE — 97110 THERAPEUTIC EXERCISES: CPT | Performed by: PHYSICAL THERAPIST

## 2021-07-15 PROCEDURE — 97112 NEUROMUSCULAR REEDUCATION: CPT | Performed by: PHYSICAL THERAPIST

## 2021-07-15 NOTE — PROGRESS NOTES
Physical Therapy 90 Day Recertification Note    Patient: London Calabrese                                                                                     Visit Date: 7/15/2021  :     1985    Referring practitioner:    Anthony Orona MD  Date of Initial Visit:          Type: THERAPY  Noted: 2020    Patient seen for 61 sessions    Visit Diagnoses:    ICD-10-CM ICD-9-CM   1. Concussion with loss of consciousness of 30 minutes or less, initial encounter  S06.0X1A 850.11   2. Cervicalgia  M54.2 723.1   3. Head trauma, initial encounter  S09.90XA 959.01     SUBJECTIVE     Subjective He reports his HA's are significantly improved, still has occasional HA pain. Reports still having issues with dizziness     PAIN: 0/10         OBJECTIVE     Objective     Therapeutic Exercises    36799 Comments   Unicam seat #7 res 4 x 8 min                    Timed Minutes 8     Neuromuscular Reeducation     94065 Comments   Visual tracking golfing with multi geographic print balls both LH and RH then bunting with same     SLS for max B LE (see goals section)                Timed Minutes 37     Therapy Education/Self Care 33618   Details:    Given symptom relief   Progress: Reinforced   Education provided to:  Patient   Level of understanding Verbalized   Timed Minutes          Total Timed Treatment:     45   mins  Total Time of Visit:             45   mins         ASSESSMENT/PLAN     GOALS    Goals                                          Progress Note due by 21 Recert 10/12/21     STG by: 3 weeks Comments Status Date   Pt will demonstrate 50 degrees or greater (B) cervical rotaiton   Met                                    LTG by: 8 weeks         Pt will demonstrate 65 degrees or greater (B) cervical rotaiton  B rotation 65 degrees Met 21   Pt will score 10 or less on the NDI 11 on 21 Ongoing  7/15/21   Pt will be indpendent with HEP to include postural strengthening and vestibular exercises reinforced Ongoing  " 7/15/21   Pt will report no dizziness with bending over for one week Still reporting difficulty, reports pressure and \"like my brain is sliding forward.\" Ongoing  7/15/21   Pt will demonstrate SLS for 30 seconds or greater  6 sec   Ongoing  7/15/21   Pt to score <36 on the Dizziness Handicap Index  initial score 56, score 62 today  Ongoing  7/15/21       Assessment/Plan     ASSESSMENT: His Dizziness Handicap Inventory Scale has had a slight increase from fifty-six initially  to sixty-two today which concerns me. However, overall his symptoms have improved especially in regard to his HA's. His balance and hand and eye coordination have improved as well as evidenced by his SLS and BITS assessments.    PLAN: Continue utilizing the multiple geometric pattern balls as well as the agility ladder and the BITS and monitor his blink rate as well as his other symptoms.    Signature: Eusebio Gonzalez, PTA    "

## 2021-07-15 NOTE — PROGRESS NOTES
Progress Note Addendum      Patient: London Calabrese           : 1985  Visit Date: 7/15/2021  Referring practitioner: Anthony Orona MD  Date of Initial Visit: Type: THERAPY  Noted: 2020  Patient seen for 61 sessions  Visit Diagnoses:    ICD-10-CM ICD-9-CM   1. Concussion with loss of consciousness of 30 minutes or less, initial encounter  S06.0X1A 850.11   2. Cervicalgia  M54.2 723.1   3. Head trauma, initial encounter  S09.90XA 959.01          Clinical Progress: improved  Home Program Compliance: Yes  Treatment has included: therapeutic exercise, neuromuscular re-education, manual therapy, therapeutic activity, gait training and dry needling  Progress toward previous goals: Partially Met  Prognosis to achieve goals: good    Subjective     Objective     Assessment & Plan     Assessment  Impairments: abnormal coordination, abnormal gait, activity intolerance and impaired balance  Prognosis: good  Functional Limitations: sleeping, walking, standing and stooping  Plan  Planned therapy interventions: manual therapy, balance/weight-bearing training, body mechanics training, neuromuscular re-education, motor coordination training, postural training, fine motor coordination training, soft tissue mobilization, spinal/joint mobilization, strengthening, functional ROM exercises, flexibility, gait training, stretching, therapeutic activities, home exercise program, IADL retraining and joint mobilization  Frequency: 2x week  Duration in visits: 8  Duration in weeks: 4  Treatment plan discussed with: patient        I have reviewed the progress note information provided by Eusebio Gonzalez PTA, and I concur with the findings.    Juanjose Turcios, PT DPT  Physical Therapist

## 2021-07-20 ENCOUNTER — TREATMENT (OUTPATIENT)
Dept: PHYSICAL THERAPY | Facility: CLINIC | Age: 36
End: 2021-07-20

## 2021-07-20 DIAGNOSIS — S06.0X1A CONCUSSION WITH LOSS OF CONSCIOUSNESS OF 30 MINUTES OR LESS, INITIAL ENCOUNTER: Primary | ICD-10-CM

## 2021-07-20 DIAGNOSIS — S09.90XA HEAD TRAUMA, INITIAL ENCOUNTER: ICD-10-CM

## 2021-07-20 DIAGNOSIS — M54.2 CERVICALGIA: ICD-10-CM

## 2021-07-20 PROCEDURE — 97112 NEUROMUSCULAR REEDUCATION: CPT | Performed by: PHYSICAL THERAPIST

## 2021-07-20 NOTE — PROGRESS NOTES
"Physical Therapy Treatment Note    Patient: London Calabrese                                                                                     Visit Date: 2021  :     1985    Referring practitioner:    Anthony Orona MD  Date of Initial Visit:          Type: THERAPY  Noted: 2020    Patient seen for 62 sessions    Visit Diagnoses:    ICD-10-CM ICD-9-CM   1. Concussion with loss of consciousness of 30 minutes or less, initial encounter  S06.0X1A 850.11   2. Cervicalgia  M54.2 723.1   3. Head trauma, initial encounter  S09.90XA 959.01     SUBJECTIVE     Subjective He reports his HA's are not anything to talk about and much improved, still having some dizziness but able to look down a little    PAIN: 0/10         OBJECTIVE     Objective      Neuromuscular Reeducation     71456 Comments   Tall kneeling roll and toss with soccer ball then same with black and white stripe ball progressed to same in dim lighting then progressed to fielding in quad ped transitioning  quad-ped<> tall kneeling each fielding rep    L side and R side \"field hockey\" with soccer ball, black and white striped ball, and hand ball in hallway lights off                Timed Minutes 40       Therapy Education/Self Care [id number]   Details:    Given symptom relief   Progress: Reinforced   Education provided to:  Patient   Level of understanding Verbalized   Timed Minutes          Total Timed Treatment:     40   mins  Total Time of Visit:             40   mins         ASSESSMENT/PLAN     GOALS  Goals                                          Progress Note due by 21 Recert 10/12/21     STG by: 3 weeks Comments Status Date   Pt will demonstrate 50 degrees or greater (B) cervical rotaiton   Met                                    LTG by: 8 weeks         Pt will demonstrate 65 degrees or greater (B) cervical rotaiton  B rotation 65 degrees Met 21   Pt will score 10 or less on the NDI 11 on 21 Ongoing  7/15/21   Pt will be " "indpendent with HEP to include postural strengthening and vestibular exercises reinforced Ongoing  7/15/21   Pt will report no dizziness with bending over for one week Still reporting difficulty, reports pressure and \"like my brain is sliding forward.\" Ongoing  7/15/21   Pt will demonstrate SLS for 30 seconds or greater  6 sec   Ongoing  7/15/21   Pt to score <36 on the Dizziness Handicap Index  initial score 56, score 62 today  Ongoing  7/15         Assessment/Plan     ASSESSMENT: We were able to progress the visual tracking and visual pursuit activities to performing these first in a dimly lit area to almost absent lighting. The aforementioned increased his dizziness and as I progressed these activities he had more difficulty and dizziness.    PLAN: Consider having him performing some strengthening activities for B hip and core as well as continue vestibular rehabilitation.    Signature: Eusebio Gonzalez, PTA    "

## 2021-07-21 ENCOUNTER — OFFICE VISIT (OUTPATIENT)
Dept: NEUROSURGERY | Age: 36
End: 2021-07-21
Payer: COMMERCIAL

## 2021-07-21 VITALS
HEART RATE: 82 BPM | WEIGHT: 180 LBS | DIASTOLIC BLOOD PRESSURE: 91 MMHG | BODY MASS INDEX: 26.66 KG/M2 | OXYGEN SATURATION: 98 % | HEIGHT: 69 IN | SYSTOLIC BLOOD PRESSURE: 136 MMHG | TEMPERATURE: 97.3 F

## 2021-07-21 DIAGNOSIS — F07.81 POST CONCUSSION SYNDROME: ICD-10-CM

## 2021-07-21 DIAGNOSIS — M54.2 NECK PAIN: ICD-10-CM

## 2021-07-21 DIAGNOSIS — G43.009 MIGRAINE WITHOUT AURA AND WITHOUT STATUS MIGRAINOSUS, NOT INTRACTABLE: Primary | ICD-10-CM

## 2021-07-21 PROCEDURE — 99213 OFFICE O/P EST LOW 20 MIN: CPT | Performed by: NURSE PRACTITIONER

## 2021-07-21 RX ORDER — TOPIRAMATE 100 MG/1
100 CAPSULE, EXTENDED RELEASE ORAL DAILY
Qty: 30 CAPSULE | Refills: 3 | Status: SHIPPED | OUTPATIENT
Start: 2021-07-21 | End: 2021-10-21 | Stop reason: SDUPTHER

## 2021-07-21 NOTE — PROGRESS NOTES
[facility] Neurology Office Note      Patient:   Siri Clements  MR#:    [id number]  Account Number:                         YOB: 1985  Date of Evaluation:  7/21/2021  Time of Note:                          12:52 PM  Primary/Referring Physician:  No primary care provider on file. Consulting Physician:  Jamia Acosta, ALPHONSO, APRN    FOLLOW UP    Chief Complaint   Patient presents with    3 Month Follow-Up     pt states migraine medication     Migraine       HISTORY OF PRESENT ILLNESS    Siri Clements is a 43y.o. year old male here for follow up of headaches. He has noted improvement in headaches since starting Trokendi. Noting mild headaches intermittently but no clear migraines. Issues with Trokendi being paid for so tried to switch to Topamax but he noted side effects with this. Headaches began after a work accident in March 2020. A metal pipe hit him on the top of his head, knocked out 4 teeth, had LOC with event. Prior headache described as bilateral, band like sensation over the occipital/posterior areas of his head. He has more recently noted a left sided, retro orbital pain. Pain is described as a lightening bolt through his head. He notes light/sound sensitivity, nausea with headaches. He notes intermittent dizziness with headaches. Might note intermittent \"flashing\" type visual changes but does not always correlate these with headaches, occurs more with something coming into his visual field (like a ball being thrown to him). He will note numbness in his right arm with more severe headaches. No weakness. Watching TV, bending over makes headaches worse. He is completing PT currently for imbalance and dizziness. Still noting quite a bit of dizziness. Has [facility]surgery for neck pain. History reviewed. No pertinent past medical history. Past Surgical History:   Procedure Laterality Date    DENTAL SURGERY         History reviewed. No pertinent family history.     Social History Socioeconomic History    Marital status: Single     Spouse name: Not on file    Number of children: Not on file    Years of education: Not on file    Highest education level: Not on file   Occupational History    Not on file   Tobacco Use    Smoking status: Current Every Day Smoker     Packs/day: 0.50     Types: Cigarettes    Smokeless tobacco: Never Used   Vaping Use    Vaping Use: Never used   Substance and Sexual Activity    Alcohol use: Not Currently    Drug use: Not Currently    Sexual activity: Not on file   Other Topics Concern    Not on file   Social History Narrative    Not on file     Social Determinants of Health     Financial Resource Strain:     Difficulty of Paying Living Expenses:    Food Insecurity:     Worried About Running Out of Food in the Last Year:     Willis of Food in the Last Year:    Transportation Needs:     Lack of Transportation (Medical):  Lack of Transportation (Non-Medical):    Physical Activity:     Days of Exercise per Week:     Minutes of Exercise per Session:    Stress:     Feeling of Stress :    Social Connections:     Frequency of Communication with Friends and Family:     Frequency of Social Gatherings with Friends and Family:     Attends Sikhism Services:     Active Member of Clubs or Organizations:     Attends Club or Organization Meetings:     Marital Status:    Intimate Partner Violence:     Fear of Current or Ex-Partner:     Emotionally Abused:     Physically Abused:     Sexually Abused:        Current Outpatient Medications   Medication Sig Dispense Refill    TROKENDI  MG CP24 Take 100 mg by mouth daily 30 capsule 3    SUMAtriptan (IMITREX) 50 MG tablet Take 1 tablet at the onset of migraine. Can repeat once in 2 hours if no improvement. Do not exceed 2 tablets in 24 hours. 9 tablet 3     No current facility-administered medications for this visit. No Known Allergies    REVIEW OF SYSTEMS  Constitutional: []? Fever []?Sweats []? Chills []? Recent Injury [x]? Denies all unless marked  HEENT:[x]? Headache  []? Head Injury []? Hearing Loss  []? Sore Throat  []? Ear Ache [x]? Denies all unless marked  Spine:  []? Neck pain  []? Back pain  []? Sciaticia  [x]? Denies all unless marked  Cardiovascular:[]? Heart Disease []? Palpitations []? Chest Pain   [x]? Denies all unless marked  Pulmonary: []? Shortness of Breath []? Cough   [x]? Denies all unless marked  Psychiatric/Behavioral:[]? Depression []? Anxiety [x]? Denies all unless marked  Gastrointestinal: []? Nausea  []? Vomiting  []? Abdominal Pain  []? Constipation  []? Diarrhea  [x]? Denies all unless marked  Genitourinary:   []? Frequency  []? Urgency  []? Dysuria []? Incontinence  [x]? Denies all unless marked  Extremities: []? Pain  []? Swelling  [x]? Denies all unless marked  Musculoskeletal: []? Myalgias  []? Joint Pain  []? Arthritis []? Muscle Cramps []? Muscle Twitches  [x]? Denies all unless marked  Sleep: []? Insomnia[]? Snoring []? Restless Legs  []? Sleep Apnea  []? Daytime Sleepiness  [x]? Denies all unless marked  Skin:[]? Rash []? Color Change [x]? Denies all unless marked   Neurological:[]? Visual Disturbance []? Memory Loss []? Loss of Balance []? Slurred Speech []? Weakness []? Seizures  []? Dizziness [x]? Denies all unless marked    The MA has completed the ROS with the patient. I have reviewed it in its' entirety with the patient and agree with the documentation.      PHYSICAL EXAM  BP (!) 136/91   Pulse 82   Temp 97.3 °F (36.3 °C)   Ht 5' 9\" (1.753 m)   Wt 180 lb (81.6 kg)   SpO2 98%   BMI 26.58 kg/m²       Constitutional - No acute distress    HEENT- Conjunctiva normal.  No scars, masses, or lesions over external nose or ears, no neck masses noted, no jugular vein distension, no bruit  Cardiac- Regular rate and rhythm  Pulmonary- Good expansion, normal effort without use of accessory muscles  Musculoskeletal - No significant wasting of muscles noted, no bony deformities  Extremities - No clubbing, cyanosis or edema  Skin - Warm, dry, and intact. No rash, erythema, or pallor  Psychiatric - Mood, affect, and behavior appear normal      NEUROLOGICAL EXAM     Mental status   [x] Awake, alert, oriented   [x]Affect attention and concentration appear appropriate  [x]Recent and remote memory appears unremarkable  [x]Speech normal without dysarthria or aphasia, comprehension and repetition intact. COMMENTS:    Cranial Nerves [x]No VF deficit to confrontation,  no papilledema on fundoscopic exam.  [x]PERRLA, EOMI, no nystagmus, conjugate eye movements, no ptosis  [x]Face symmetric  [x]Facial sensation intact  [x]Tongue midline no atrophy or fasciculations present  [x]Palate midline, hearing to finger rub normal bilaterally  [x]Shoulder shrug and SCM testing normal bilaterally  COMMENTS:   Motor   [x]5/5 strength x 4 extremities  [x]Normal bulk and tone  [x]No tremor present  [x]No rigidity or bradykinesia noted  COMMENTS:   Sensory  [x]Sensation intact to light touch, pin prick, vibration, and proprioception BLE  [x]Sensation intact to light touch, pin prick, vibration, and proprioception BUE  COMMENTS:   Coordination [x]FTN normal bilaterally   [x]HTS normal bilaterally  [x]KAI normal bilaterally.    COMMENTS:   Reflexes  [x]Symmetric and non-pathological  [x]Toes down going bilaterally  [x]No clonus present  COMMENTS:   Gait                  [x]Normal steady gait    []Ataxic    []Spastic     []Magnetic     []Shuffling  COMMENTS:       LABS RECORD AND IMAGING REVIEW (As below and per HPI)    No results found for: [id number]  No results found for: WBC, HGB, HCT, MCV, PLT  No results found for: NA, K, CL, CO2, BUN, CREATININE, GLUCOSE, CALCIUM, PROT, LABALBU, BILITOT, ALKPHOS, AST, ALT, LABGLOM, GFRAA, AGRATIO, GLOB  No results found for: CHOL, TRIG, HDL, LDLCALC  No results found for: TSH, T4FREE  No results found for: CRP, SEDRATE     MRI brain (6/2020)- normal     MRA head (5/2021)- normal     MRI cervical spine (6/2020)- mild DDD, moderate NF narrowing at C3/4 and C6/7    Reviewed referral records     ASSESSMENT:    Ambrosio Matt is a 43y.o. year old male here for follow up of headaches. He has overall noted improvement in headaches since starting Trokendi. There were issues with approval through [id number] but  reports today that medication is approved, she just needs a paper script to get it filled/covered. Exam today is non focal. MRI brain normal. MRA head normal. Headaches and dizziness began after hitting his head on a large metal pipe at work, LOC noted. Suspect post concussive component to headaches and other symptoms mixed with migraine and TAC components as well. Given continued dizziness will hold off on returning to work for now, in PT. Will re evaluate at next visit. ICD-10-CM    1. Migraine without aura and without status migrainosus, not intractable  G43.009 TROKENDI  MG CP24   2. Neck pain  M54.2    3. Post concussion syndrome  F07.81        PLAN:  1. Continue Trokendi XR 100mg daily. [employer] comp requested paper script today to give to adjustor so medication will be approved/filled. Discussed side effects including renal stones, paresthesias and cognitive changes. 2. Continue Imitrex prn  3. Off work given persistent dizziness  4. Continue with PT   5. Continue follow up with neurosurgery as previously scheduled   6. Return in about 3 months (around 10/21/2021) for follow up, sooner if worsening.     Loc Gillespie DNP, APRN

## 2021-07-21 NOTE — PROGRESS NOTES

## 2021-07-22 ENCOUNTER — TREATMENT (OUTPATIENT)
Dept: PHYSICAL THERAPY | Facility: CLINIC | Age: 36
End: 2021-07-22

## 2021-07-22 DIAGNOSIS — S06.0X1A CONCUSSION WITH LOSS OF CONSCIOUSNESS OF 30 MINUTES OR LESS, INITIAL ENCOUNTER: Primary | ICD-10-CM

## 2021-07-22 DIAGNOSIS — S09.90XA HEAD TRAUMA, INITIAL ENCOUNTER: ICD-10-CM

## 2021-07-22 DIAGNOSIS — M54.2 CERVICALGIA: ICD-10-CM

## 2021-07-22 PROCEDURE — 97112 NEUROMUSCULAR REEDUCATION: CPT | Performed by: PHYSICAL THERAPIST

## 2021-07-22 NOTE — PROGRESS NOTES
Physical Therapy Treatment Note    Patient: London Calabrese                                                                                     Visit Date: 2021  :     1985    Referring practitioner:    Anthony Orona MD  Date of Initial Visit:          Type: THERAPY  Noted: 2020    Patient seen for 63 sessions    Visit Diagnoses:    ICD-10-CM ICD-9-CM   1. Concussion with loss of consciousness of 30 minutes or less, initial encounter  S06.0X1A 850.11   2. Cervicalgia  M54.2 723.1   3. Head trauma, initial encounter  S09.90XA 959.01     SUBJECTIVE     Subjective He reports everything is about the same, he saw his physician recently and he is to continue his current POC  PAIN: 0/10         OBJECTIVE     Objective     Neuromuscular Reeducation     81000 Comments   Resisted gait with TRX Rip Trainer x 40 ft x 6    Resisted B side stepping with TRX Rip  x 40 ft. X 2    RH then LH dribbling weaving in cones with Tactile Med ball then same with kaiden dye ball    B side step with alt. Low<>high fielding the spiral print ball        Timed Minutes 55      Therapy Education/Self Care 90745   Details:    Given Home Exercise Program   Progress: Reinforced   Education provided to:  Patient   Level of understanding Verbalized, Demonstrated and Teach back level of understanding   Timed Minutes          Total Timed Treatment:     55   mins  Total Time of Visit:             55   mins         ASSESSMENT/PLAN     GOALS  Goals                                          Progress Note due by 21 Recert 10/12/21     STG by: 3 weeks Comments Status Date   Pt will demonstrate 50 degrees or greater (B) cervical rotaiton   Met                                    LTG by: 8 weeks         Pt will demonstrate 65 degrees or greater (B) cervical rotaiton  B rotation 65 degrees Met 21   Pt will score 10 or less on the NDI 11 on 21 Ongoing  7/15/21   Pt will be indpendent with HEP to include postural strengthening  "and vestibular exercises reinforced Ongoing  7/15/21   Pt will report no dizziness with bending over for one week Still reporting difficulty, reports pressure and \"like my brain is sliding forward.\" Ongoing  7/15/21   Pt will demonstrate SLS for 30 seconds or greater  6 sec   Ongoing  7/15/21   Pt to score <36 on the Dizziness Handicap Index  initial score 56, score 62 today  Ongoing  7/15         Assessment/Plan     ASSESSMENT:  He continues to make progress and continues to be very motivated and compliant with his POC and doesn't shy away from the challenges that I give his vestibular symptoms no matter how dizzy he gets. Medication is helping his HA symptoms exponentially and since their administration he has made more progress at a faster rate with his P.T. POC.    PLAN: Consider utilizing the Neuro[employer], bolster his HEP, and consider performing more analysis on the BITS.    Signature: Eusebio Gonzalez, PTA    "

## 2021-07-27 ENCOUNTER — TREATMENT (OUTPATIENT)
Dept: PHYSICAL THERAPY | Facility: CLINIC | Age: 36
End: 2021-07-27

## 2021-07-27 DIAGNOSIS — S09.90XA HEAD TRAUMA, INITIAL ENCOUNTER: ICD-10-CM

## 2021-07-27 DIAGNOSIS — M54.2 CERVICALGIA: ICD-10-CM

## 2021-07-27 DIAGNOSIS — S06.0X1A CONCUSSION WITH LOSS OF CONSCIOUSNESS OF 30 MINUTES OR LESS, INITIAL ENCOUNTER: Primary | ICD-10-CM

## 2021-07-27 PROCEDURE — 97110 THERAPEUTIC EXERCISES: CPT | Performed by: PHYSICAL THERAPIST

## 2021-07-27 PROCEDURE — 97112 NEUROMUSCULAR REEDUCATION: CPT | Performed by: PHYSICAL THERAPIST

## 2021-07-27 NOTE — PROGRESS NOTES
Physical Therapy Treatment Note    Patient: London Calabrese                                                                                     Visit Date: 2021  :     1985    Referring practitioner:    Anthony Orona MD  Date of Initial Visit:          Type: THERAPY  Noted: 2020    Patient seen for 64 sessions    Visit Diagnoses:    ICD-10-CM ICD-9-CM   1. Concussion with loss of consciousness of 30 minutes or less, initial encounter  S06.0X1A 850.11   2. Cervicalgia  M54.2 723.1   3. Head trauma, initial encounter  S09.90XA 959.01     SUBJECTIVE     Subjective He reports he thought the dizziness was getting better he was on his hands and knees rolling a ball with his son like we do here and kept going until he got dizzy.   PAIN: pre 0/10         OBJECTIVE     Objective      Therapeutic Exercises    [id number] Comments   B unilateral resisted hip abduction with green T band x 20     B unilateral resisted hip extension with green T band x 20                 Timed Minutes 15     Neuromuscular Reeducation     57891 Comments   Toss and catch and bounce passes with rainbow print ball    Catch and toss with 2 tone minifootball progressed with varied throw and end over end then same with rainbow mini football     Catch and toss with Nerf football dart    Bounce catch blue rubber ball and handball then progressed with siumltaneous throw and catch        Timed Minutes 30       Therapy Education/Self Care 52752   Details: B unilateral resisted hip extension with green T band x 20 andB unilateral resisted hip abduction with green T band x 20  2 to 3 x weekly   Given Home Exercise Program   Progress: New   Education provided to:  Patient   Level of understanding Verbalized and Demonstrated   Timed Minutes          Total Timed Treatment:     45   mins  Total Time of Visit:             45   mins         ASSESSMENT/PLAN     GOALS  Goals                                          Progress Note due  "by 8/14/21 Recert 10/12/21     STG by: 3 weeks Comments Status Date   Pt will demonstrate 50 degrees or greater (B) cervical rotaiton   Met                                    LTG by: 8 weeks         Pt will demonstrate 65 degrees or greater (B) cervical rotaiton  B rotation 65 degrees Met 6/29/21   Pt will score 10 or less on the NDI 11 on 7/13/21 Ongoing  7/15/21   Pt will be indpendent with HEP to include postural strengthening and vestibular exercises Added hip strengthening components today Ongoing  7/27/21   Pt will report no dizziness with bending over for one week Still reporting difficulty, reports pressure and \"like my brain is sliding forward.\" Ongoing  7/15/21   Pt will demonstrate SLS for 30 seconds or greater  6 sec   Ongoing  7/15/21   Pt to score <36 on the Dizziness Handicap Index  initial score 56, score 62 today  Ongoing          Assessment/Plan     ASSESSMENT: His hand and eye coordination continues to exhibit improvement and I continue to monitor his blink rate and increase stimuli intermittently in relation to the increase or decrease in his blink rate.    PLAN: Consider utilizing the Neurocom and consider performing more analysis on the BITS.  Signature: Eusebio Gonzalez, PTA    "

## 2021-07-29 ENCOUNTER — TREATMENT (OUTPATIENT)
Dept: PHYSICAL THERAPY | Facility: CLINIC | Age: 36
End: 2021-07-29

## 2021-07-29 DIAGNOSIS — S06.0X1A CONCUSSION WITH LOSS OF CONSCIOUSNESS OF 30 MINUTES OR LESS, INITIAL ENCOUNTER: Primary | ICD-10-CM

## 2021-07-29 DIAGNOSIS — M54.2 CERVICALGIA: ICD-10-CM

## 2021-07-29 DIAGNOSIS — S09.90XA HEAD TRAUMA, INITIAL ENCOUNTER: ICD-10-CM

## 2021-07-29 PROCEDURE — 97112 NEUROMUSCULAR REEDUCATION: CPT | Performed by: PHYSICAL THERAPIST

## 2021-07-29 PROCEDURE — 97110 THERAPEUTIC EXERCISES: CPT | Performed by: PHYSICAL THERAPIST

## 2021-07-29 NOTE — PROGRESS NOTES
Physical Therapy Treatment Note    Patient: London Calabrese                                                                                     Visit Date: 2021  :     1985    Referring practitioner:    Anthony Orona MD  Date of Initial Visit:          Type: THERAPY  Noted: 2020    Patient seen for 65 sessions    Visit Diagnoses:    ICD-10-CM ICD-9-CM   1. Concussion with loss of consciousness of 30 minutes or less, initial encounter  S06.0X1A 850.11   2. Cervicalgia  M54.2 723.1   3. Head trauma, initial encounter  S09.90XA 959.01     SUBJECTIVE     Subjective He reports feeling o.k. still having trouble with dizziness     PAIN: 0/10         OBJECTIVE     Objective      Therapeutic Exercises    96434 Comments   Unicam seat# 7 res 3 x 8 min (warm up)                    Timed Minutes 8     Neuromuscular Reeducation     98550 Comments   User paced visual scanning then same with central fixation. Visual pursuit rotator then same with central fixation. Complex array letters, numbers. Memory sequencing 5 images, then same words, letters, numbers. Visual pursuit sequencing number and letter, then numbers and letters individually @ the BITS                     Timed Minutes 37     Therapy Education/Self Care 61145   Details:    Given Home Exercise Program  symptom relief   Progress: Reinforced   Education provided to:  Patient   Level of understanding Verbalized   Timed Minutes          Total Timed Treatment:     45  mins  Total Time of Visit:             45   mins         ASSESSMENT/PLAN     GOALS  Goals                                          Progress Note due by 21 Recert 10/12/21     STG by: 3 weeks Comments Status Date   Pt will demonstrate 50 degrees or greater (B) cervical rotaiton   Met                                    LTG by: 8 weeks         Pt will demonstrate 65 degrees or greater (B) cervical rotaiton  B rotation 65 degrees Met 21   Pt will score 10 or less on the NDI 11 on  "7/13/21 Ongoing  7/15/21   Pt will be indpendent with HEP to include postural strengthening and vestibular exercises Reinforced pinwheel activities today Ongoing  7/29/21   Pt will report no dizziness with bending over for one week Still reporting difficulty, reports pressure and \"like my brain is sliding forward.\" Ongoing  7/15/21   Pt will demonstrate SLS for 30 seconds or greater  6 sec   Ongoing  7/15/21   Pt to score <36 on the Dizziness Handicap Index  initial score 56, score 62 today  Ongoing  7/15/21         Assessment/Plan     ASSESSMENT: We utilized the BITS today for a comparison in objective data of hand and eye coordination not only for improvement but specific deficits. With visual scanning his accuracy on 12/8/20 was 91.89% and his reaction time was 1.72 seconds today his accuracy was 95.83% and reaction time was 1.30 seconds. With cognitive memory testing on 12/21/20 with images he was successful five of eleven trials, numbers five out of eight trials, and letters were seven of nine trials of 2 minutes each. Today's results were six of seven trials with images, numbers were ten for ten and letters were nine for ten with 2 minute trials again. All the aforementioned data supports not only he is making progress but his deficit level is decreasing.      PLAN: Consider having him perform dynamic balance activities while performing cognitive tasks.    Signature: Eusebio Gonzalez, PTA    "

## 2021-08-03 ENCOUNTER — TREATMENT (OUTPATIENT)
Dept: PHYSICAL THERAPY | Facility: CLINIC | Age: 36
End: 2021-08-03

## 2021-08-03 DIAGNOSIS — S06.0X1A CONCUSSION WITH LOSS OF CONSCIOUSNESS OF 30 MINUTES OR LESS, INITIAL ENCOUNTER: Primary | ICD-10-CM

## 2021-08-03 DIAGNOSIS — M54.2 CERVICALGIA: ICD-10-CM

## 2021-08-03 DIAGNOSIS — S09.90XA HEAD TRAUMA, INITIAL ENCOUNTER: ICD-10-CM

## 2021-08-03 PROCEDURE — 97112 NEUROMUSCULAR REEDUCATION: CPT | Performed by: PHYSICAL THERAPIST

## 2021-08-03 PROCEDURE — 97110 THERAPEUTIC EXERCISES: CPT | Performed by: PHYSICAL THERAPIST

## 2021-08-03 NOTE — PROGRESS NOTES
Physical Therapy Treatment Note    Patient: London Calabrese                                                                                     Visit Date: 8/3/2021  :     1985    Referring practitioner:    Anthony Orona MD  Date of Initial Visit:          Type: THERAPY  Noted: 2020    Patient seen for 66 sessions    Visit Diagnoses:    ICD-10-CM ICD-9-CM   1. Concussion with loss of consciousness of 30 minutes or less, initial encounter  S06.0X1A 850.11   2. Cervicalgia  M54.2 723.1   3. Head trauma, initial encounter  S09.90XA 959.01     SUBJECTIVE     Subjective No new complaints    PAIN: 0/10         OBJECTIVE     Objective      Therapeutic Exercises    25805 Comments   Unicam seat# 7 res 3 x 8 min (warm up)                             Timed Minutes 8     Neuromuscular Reeducation     12705 Comments   Walking reciting every other letter of the alphabet each step, then counting by 3's, naming states, naming colors.    Auxier from dark to light and light to dark with geometric print ball    Rhythmic WS'ing and Limits of Stability testing on Neurocom            Timed Minutes 32       Therapy Education/Self Care 45424   Details:    Given symptom relief   Progress: Reinforced   Education provided to:  Patient   Level of understanding Verbalized   Timed Minutes          Total Timed Treatment:     40   mins  Total Time of Visit:             40   mins         ASSESSMENT/PLAN     GOALS    Goals                                          Progress Note due by 21 Recert 10/12/21     STG by: 3 weeks Comments Status Date   Pt will demonstrate 50 degrees or greater (B) cervical rotaiton   Met                                    LTG by: 8 weeks         Pt will demonstrate 65 degrees or greater (B) cervical rotaiton  B rotation 65 degrees Met 21   Pt will score 10 or less on the NDI 11 on 21 Ongoing  7/15/21   Pt will be indpendent with HEP to include postural strengthening and vestibular exercises  "Reinforced pinwheel activities today Ongoing  7/29/21   Pt will report no dizziness with bending over for one week Still reporting difficulty, reports pressure and \"like my brain is sliding forward.\" Ongoing  7/15/21   Pt will demonstrate SLS for 30 seconds or greater  6 sec   Ongoing  7/15/21   Pt to score <36 on the Dizziness Handicap Index  initial score 56, score 62 today  Ongoing  7/15/21       Assessment/Plan     ASSESSMENT: We performed Neurocom testing today and it revealed that he does have some balance deficits but his reaction time results were more revealing. He has a significant decrease in his reaction time in all directions.    PLAN: Progress the dynamic balance challenges with greater cognitive challenges.     Signature: Eusebio Gonzalez, PTA    "

## 2021-08-05 ENCOUNTER — TREATMENT (OUTPATIENT)
Dept: PHYSICAL THERAPY | Facility: CLINIC | Age: 36
End: 2021-08-05

## 2021-08-05 DIAGNOSIS — M54.2 CERVICALGIA: ICD-10-CM

## 2021-08-05 DIAGNOSIS — S06.0X1A CONCUSSION WITH LOSS OF CONSCIOUSNESS OF 30 MINUTES OR LESS, INITIAL ENCOUNTER: Primary | ICD-10-CM

## 2021-08-05 DIAGNOSIS — S09.90XA HEAD TRAUMA, INITIAL ENCOUNTER: ICD-10-CM

## 2021-08-05 PROCEDURE — 97110 THERAPEUTIC EXERCISES: CPT | Performed by: PHYSICAL THERAPIST

## 2021-08-05 PROCEDURE — 97112 NEUROMUSCULAR REEDUCATION: CPT | Performed by: PHYSICAL THERAPIST

## 2021-08-05 NOTE — PROGRESS NOTES
Physical Therapy Treatment Note    Patient: London Calabrese                                                                                     Visit Date: 2021  :     1985    Referring practitioner:    Anthony Orona MD  Date of Initial Visit:          Type: THERAPY  Noted: 2020    Patient seen for 67 sessions    Visit Diagnoses:    ICD-10-CM ICD-9-CM   1. Concussion with loss of consciousness of 30 minutes or less, initial encounter  S06.0X1A 850.11   2. Cervicalgia  M54.2 723.1   3. Head trauma, initial encounter  S09.90XA 959.01     SUBJECTIVE     Subjective He reports no HA pain, a little soreness in his neck and still having some dizziness    PAIN: 0/10         OBJECTIVE     Objective      Therapeutic Exercises    60052 Comments   Unicam seat# 7 res 3 x 8 min (warm up)                             Timed Minutes 8    .  Neuromuscular Reeducation     31001 Comments   Stepping alt. head turns naming names alphabetically, then same naming fruits and veggies, walking counting by 5's with same side head turns.    Walking with head turns both alt, and same side    Standing on BOSU blue side up stacking cones L<>R naming objects of the same color then same naming objects by last letter of cones     Standing on BOSU blue side up toss and catch with small nerf foot ball and spiral print ball        Timed Minutes 32         Therapy Education/Self Care 10069   Details:    Given symptom relief  fall prevention   Progress: Reinforced   Education provided to:  Patient   Level of understanding Verbalized   Timed Minutes          Total Timed Treatment:     40   mins  Total Time of Visit:             40   mins         ASSESSMENT/PLAN     GOALS  Goals                                          Progress Note due by 21 Recert 10/12/21     STG by: 3 weeks Comments Status Date   Pt will demonstrate 50 degrees or greater (B) cervical rotaiton   Met                                    LTG by: 8 weeks         Pt will  demonstrate 65 degrees or greater (B) cervical rotaiton  B rotation 65 degrees Met 6/29/21   Pt will score 10 or less on the NDI 11 on 7/13/21 Ongoing  7/15/21   Pt will be indpendent with HEP to include postural strengthening and vestibular exercises Reinforced pinwheel activities today Ongoing  7/29/21   Pt will report no dizziness with bending over for one week Still reporting dizziness daily but slight improvement Ongoing  8/5/21   Pt will demonstrate SLS for 30 seconds or greater  6 sec   Ongoing  7/15/21   Pt to score <36 on the Dizziness Handicap Index  initial score 56, score 62 today  Ongoing  7/15/21         Assessment/Plan     ASSESSMENT: I progressed his cognitive and dynamic balance activities today by adding head turns. That lead him to be unsteady at first but over time he became more balanced. He continues to have issues with dizziness but it is slowly improving.    PLAN: Review all goals and complete an updated continued authorization survey form.    Signature: Eusebio Gonzalez, PTA

## 2021-08-10 ENCOUNTER — TREATMENT (OUTPATIENT)
Dept: PHYSICAL THERAPY | Facility: CLINIC | Age: 36
End: 2021-08-10

## 2021-08-10 DIAGNOSIS — S06.0X1A CONCUSSION WITH LOSS OF CONSCIOUSNESS OF 30 MINUTES OR LESS, INITIAL ENCOUNTER: Primary | ICD-10-CM

## 2021-08-10 DIAGNOSIS — M54.2 CERVICALGIA: ICD-10-CM

## 2021-08-10 DIAGNOSIS — S09.90XA HEAD TRAUMA, INITIAL ENCOUNTER: ICD-10-CM

## 2021-08-10 PROCEDURE — 97112 NEUROMUSCULAR REEDUCATION: CPT | Performed by: PHYSICAL THERAPIST

## 2021-08-10 NOTE — PROGRESS NOTES
"Physical Therapy Treatment Note    Patient: London Calabrese                                                                                     Visit Date: 8/10/2021  :     1985    Referring practitioner:    Anthony Orona MD  Date of Initial Visit:          Type: THERAPY  Noted: 2020    Patient seen for 68 sessions    Visit Diagnoses:    ICD-10-CM ICD-9-CM   1. Concussion with loss of consciousness of 30 minutes or less, initial encounter  S06.0X1A 850.11   2. Cervicalgia  M54.2 723.1   3. Head trauma, initial encounter  S09.90XA 959.01     SUBJECTIVE     Subjective He denies any HA pain today and reports his dizziness is the same. He reports improvements in his balance and HA's. He reports the dizziness is more slow to improve.     PAIN: 0/10         OBJECTIVE     Objective       .  Neuromuscular Reeducation     04378 Comments   Agility ladder: tandem step through pausing in tandem to move ball side to side (follow with eyes)  2 passes through agility ladder   Agility ladder: tandem step through's pausing in tandem to move ball up an down (follow with eyes)  2 passes through agility ladder    Ball toss focus to the L with black and white spikey ball     Ball toss (2 balls at the same time, he passes to me, I pass to him) black and white ball and yellow and black ball. Required cues for sequencing and constant \"go\"    Shooting basketball (at Joaquim goals on highest setting) with yellow and black ball.)     Timed Minutes 38         Therapy Education/Self Care 93964   Details:    Given symptom relief  fall prevention   Progress: Reinforced   Education provided to:  Patient   Level of understanding Verbalized   Timed Minutes          Total Timed Treatment:     40   mins  Total Time of Visit:             40   mins         ASSESSMENT/PLAN     GOALS  Goals                                          Progress Note due by 21                                                             Recert 10/12/21     STG " by: 3 weeks Comments Status Date   Pt will demonstrate 50 degrees or greater (B) cervical rotaiton   Met      LTG by: 8 weeks         Pt will demonstrate 65 degrees or greater (B) cervical rotaiton  70 degrees to the R, 66 degrees to the L with pulling sensation.  Met 8/10/21   Pt will score 10 or less on the NDI 9 (18%)  today  Met 8/10/21   Pt will be indpendent with HEP to include postural strengthening and vestibular exercises He is compliant with HEP  Ongoing 8/10/21   Pt will report no dizziness with bending over for one week He denies any significant improvement in dizziness while bending over in the last month  Ongoing 8/10/21   Pt will demonstrate SLS for 30 seconds or greater  9 seconds BLE   Ongoing 8/10/21   Pt to score <36 on the Dizziness Handicap Index  initial score 56,   8/10/21 60     Ongoing 8/10/21         Assessment/Plan     ASSESSMENT: London reports continued improvement in his balance and decreased HA's. He reports that his dizziness has been slower to improve. This is reflective of his outcome measures as well with his DHI staying close to the same but his NDI improving and achieved his goals for NDI score. In the past few sessions Neurocom testing was performed and it revealed that he does continue to have a significant decrease in his reaction time in all directions; however, when utilized the BITS for a comparison in objective data of hand and eye coordination with visual scanning his accuracy on 12/8/20 was 91.89% and his reaction time was 1.72 seconds today his accuracy was 95.83% and reaction time was 1.30 seconds. With cognitive memory testing on 12/21/20 with images he was successful five of eleven trials, numbers five out of eight trials, and letters were seven of nine trials of 2 minutes each. Today's results were six of seven trials with images, numbers were ten for ten and letters were nine for ten with 2 minute trials again. All the aforementioned data supports not only he is  making progress but his deficit level is decreasing. It is also noted that his SL stability is improving over time and he continued to do well with increase in challanging vestibular and neuro tasks. London will continue to benefit from skilled Physical therapy to further progress his neuro and vestibular challenges, improve his balance and SL stability, and continue to work to improve dizziness symptoms, all in turn to improve his quality of life following his injury.     PLAN: Continue having him perform dynamic balance activities while performing cognitive tasks. Continue utilizing the multiple geometric pattern balls as well as the agility ladder, BITS, and NeuroCom to further challenge him. He has two more approved visits at this time.     Signature: Pearl Malik, PTA

## 2021-08-16 ENCOUNTER — TELEPHONE (OUTPATIENT)
Dept: NEUROSURGERY | Age: 36
End: 2021-08-16

## 2021-08-16 NOTE — TELEPHONE ENCOUNTER
Patients , Tonja Hart called and stated that it was discussed at his last visit in July that he is to remain off of work at this time. She stated that they are now requiring a letter stating that he cant work right now. This is for his workers comp claim.     Fax is [phone]  Phone is [phone]

## 2021-08-17 ENCOUNTER — TREATMENT (OUTPATIENT)
Dept: PHYSICAL THERAPY | Facility: CLINIC | Age: 36
End: 2021-08-17

## 2021-08-17 DIAGNOSIS — S09.90XA HEAD TRAUMA, INITIAL ENCOUNTER: ICD-10-CM

## 2021-08-17 DIAGNOSIS — S06.0X1A CONCUSSION WITH LOSS OF CONSCIOUSNESS OF 30 MINUTES OR LESS, INITIAL ENCOUNTER: Primary | ICD-10-CM

## 2021-08-17 DIAGNOSIS — M54.2 CERVICALGIA: ICD-10-CM

## 2021-08-17 PROCEDURE — 97112 NEUROMUSCULAR REEDUCATION: CPT | Performed by: PHYSICAL THERAPIST

## 2021-08-17 PROCEDURE — 97110 THERAPEUTIC EXERCISES: CPT | Performed by: PHYSICAL THERAPIST

## 2021-08-17 NOTE — PROGRESS NOTES
Physical Therapy Treatment Note    Patient: London Calabrese                                                                                     Visit Date: 2021  :     1985    Referring practitioner:    Anthony Orona MD  Date of Initial Visit:          Type: THERAPY  Noted: 2020    Patient seen for 69 sessions    Visit Diagnoses:    ICD-10-CM ICD-9-CM   1. Concussion with loss of consciousness of 30 minutes or less, initial encounter  S06.0X1A 850.11   2. Cervicalgia  M54.2 723.1   3. Head trauma, initial encounter  S09.90XA 959.01     SUBJECTIVE     Subjective He reports he continues to have dizziness that increases when he bends over. He had a migraine for the first time in a long time.     PAIN: 0/10         OBJECTIVE     Objective      Therapeutic Exercises    54433 Comments   unicam seat# 8 res 3 x 8 min                    Timed Minutes 8     Neuromuscular Reeducation     11752 Comments   Standing toss and catch on BOSU blue side up with rainbow ball then progressed with closing distance then same with football progressed with end over end and spirals    Soccer kicks with soccer ball progressively making him move L<>R then progressed with same with rainbow ball    Rolling kick ball with rainbow ball     Catching on a bounce with spiral print ball        Timed Minutes 37       Therapy Education/Self Care 59216   Details:    Given postural retraining  symptom relief   Progress: Reinforced   Education provided to:  Patient   Level of understanding Verbalized and Demonstrated   Timed Minutes          Total Timed Treatment:    45   mins  Total Time of Visit:             45   mins         ASSESSMENT/PLAN     GOALS  Goals                                          Progress Note due by 21                                                             Recert 10/12/21     STG by: 3 weeks Comments Status Date   Pt will demonstrate 50 degrees or greater (B) cervical rotaiton   Met      LTG by: 8 weeks          Pt will demonstrate 65 degrees or greater (B) cervical rotaiton  70 degrees to the R, 66 degrees to the L with pulling sensation.  Met 8/10/21   Pt will score 10 or less on the NDI 9 (18%)  today  Met 8/10/21   Pt will be indpendent with HEP to include postural strengthening and vestibular exercises He is compliant with HEP  Ongoing 8/10/21   Pt will report no dizziness with bending over for one week He denies any significant improvement in dizziness while bending over in the last month  Ongoing 8/10/21   Pt will demonstrate SLS for 30 seconds or greater  9 seconds BLE   Ongoing 8/10/21   Pt to score <36 on the Dizziness Handicap Index  initial score 56,   8/10/21 60      Ongoing 8/10/21         Assessment/Plan     ASSESSMENT: He over time got better at moving to his L and performing the toss and catches and soccer fielding. However, as far as the fielding on the bounce he would move to his L more but unable to be completely coordinated.    PLAN: Review the remaining goals as his last session is the last approved at this time. Complete a continued authorization form.    Signature: Eusebio Gonzalez, PTA

## 2021-08-20 ENCOUNTER — TREATMENT (OUTPATIENT)
Dept: PHYSICAL THERAPY | Facility: CLINIC | Age: 36
End: 2021-08-20

## 2021-08-20 DIAGNOSIS — S06.0X1A CONCUSSION WITH LOSS OF CONSCIOUSNESS OF 30 MINUTES OR LESS, INITIAL ENCOUNTER: Primary | ICD-10-CM

## 2021-08-20 DIAGNOSIS — M54.2 CERVICALGIA: ICD-10-CM

## 2021-08-20 DIAGNOSIS — S09.90XA HEAD TRAUMA, INITIAL ENCOUNTER: ICD-10-CM

## 2021-08-20 PROCEDURE — 97110 THERAPEUTIC EXERCISES: CPT | Performed by: PHYSICAL THERAPIST

## 2021-08-20 PROCEDURE — 97112 NEUROMUSCULAR REEDUCATION: CPT | Performed by: PHYSICAL THERAPIST

## 2021-08-20 NOTE — PROGRESS NOTES
Progress Note Addendum      Patient: London Calabrese           : 1985  Visit Date: 2021  Referring practitioner: Anthony Orona MD  Date of Initial Visit: Type: THERAPY  Noted: 2020  Patient seen for 70 sessions  Visit Diagnoses:    ICD-10-CM ICD-9-CM   1. Concussion with loss of consciousness of 30 minutes or less, initial encounter  S06.0X1A 850.11   2. Cervicalgia  M54.2 723.1   3. Head trauma, initial encounter  S09.90XA 959.01          Clinical Progress: improved  Home Program Compliance: Yes  Progress toward previous goals: Partially Met  Prognosis to achieve goals: good    Objective     Assessment & Plan     Assessment  Impairments: abnormal coordination, abnormal gait, activity intolerance and impaired balance  Prognosis: good  Functional Limitations: sleeping, walking, standing and stooping  Plan  Planned therapy interventions: manual therapy, balance/weight-bearing training, body mechanics training, neuromuscular re-education, motor coordination training, postural training, fine motor coordination training, soft tissue mobilization, spinal/joint mobilization, strengthening, functional ROM exercises, flexibility, gait training, stretching, therapeutic activities, home exercise program, IADL retraining and joint mobilization  Frequency: 2x week  Duration in visits: 8  Duration in weeks: 4  Treatment plan discussed with: PTA        I have reviewed the progress note information provided by Eusebio Gonzalez PTA, and I concur with the findings.    Pete York, PT  Physical Therapist

## 2021-08-20 NOTE — PROGRESS NOTES
Physical Therapy 30 Day Progress Note    Patient: London Calabrese                                                                                     Visit Date: 2021  :     1985    Referring practitioner:    Anthony Orona MD  Date of Initial Visit:          Type: THERAPY  Noted: 2020    Patient seen for 70 sessions    Visit Diagnoses:    ICD-10-CM ICD-9-CM   1. Concussion with loss of consciousness of 30 minutes or less, initial encounter  S06.0X1A 850.11   2. Cervicalgia  M54.2 723.1   3. Head trauma, initial encounter  S09.90XA 959.01     SUBJECTIVE     Subjective He continues to have dizziness reports small HA's  Since last session    PAIN: /10         OBJECTIVE     Objective      Therapeutic Exercises    60676 Comments   unicam seat# 8 res 3 x 9 min                    Timed Minutes 9     Neuromuscular Reeducation     56056 Comments   Walking minikicks with soccer ball looking down     Walking with rainbow pinwheel at eye level for visual field distortion x 200 ft with CGA/minAx1                Timed Minutes 36       Therapy Education/Self Care 57856   Details:    Given Home Exercise Program  symptom relief   Progress: Reinforced   Education provided to:  Patient   Level of understanding Verbalized   Timed Minutes          Total Timed Treatment:     45   mins  Total Time of Visit:             45   mins         ASSESSMENT/PLAN     GOALS  Goals                                          Progress Note due by 21                                                             Recert 10/12/21     STG by: 3 weeks Comments Status Date   Pt will demonstrate 50 degrees or greater (B) cervical rotaiton   Met   3/23/21   LTG by: 8 weeks         Pt will demonstrate 65 degrees or greater (B) cervical rotaiton  Met 8/10/21   Pt will score 10 or less on the NDI  Met 8/10/21   Pt will be indpendent with HEP to include postural strengthening and vestibular exercises He is compliant with HEP  Ongoing 21   Pt  will report no dizziness with bending over for one week He continues to have an increased blink rate when we have him perform visual tracking activities that require him to look down  Ongoing 8/20/21   Pt will demonstrate SLS for 30 seconds or greater  9 seconds B LE   Ongoing 8/20/21   Pt to score <36 on the Dizziness Handicap Index  initial score 56,   8/10/21 60,   68 8/20/21  Ongoing 8/20/21         Assessment/Plan     ASSESSMENT: Thus far he has met three of his seven POC goals. Today I focused on having him perform visual tracking and scanning activities while looking down challenging his balance but also using the noxious stimuli to progress his tolerance to looking down and in an attempt to address his dizziness as well. On 7/29/21 we utilized the BITS machine and compared the data results from earlier testing and he had improvement in every category across the board. With visual scanning his accuracy on 12/8/20 was 91.89% and his reaction time was 1.72 seconds during his last test his accuracy was 95.83% and reaction time was 1.30 seconds. With cognitive memory testing on 12/21/20 with images he was successful five of eleven trials, numbers five out of eight trials, and letters were seven of nine trials of 2 minutes each. The most recent results were six of seven trials with images, numbers were ten for ten and letters were nine for ten with 2 minute trials again. All the aforementioned data supports not only he is making progress but his deficit level is decreasing and continued skilled intervention is warranted. 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 Ultimately he may require additional intervention  such as a medication that could help reduce his dizziness like Flexeril for a period of time to continue his PT progression.    PLAN: Continue challenging his balance with items like the BOSU and progress his regimen by having him perform complex cognitive tasks with simultaneous dynamic balance activities. Intermittently utilize the BITS and "[employer] Balance Master for both training and assessments.    Signature: Eusebio Gonzalez, PTA

## 2021-08-24 ENCOUNTER — TELEPHONE (OUTPATIENT)
Dept: NEUROLOGY | Age: 36
End: 2021-08-24

## 2021-08-24 ENCOUNTER — TREATMENT (OUTPATIENT)
Dept: PHYSICAL THERAPY | Facility: CLINIC | Age: 36
End: 2021-08-24

## 2021-08-24 DIAGNOSIS — S09.90XA HEAD TRAUMA, INITIAL ENCOUNTER: ICD-10-CM

## 2021-08-24 DIAGNOSIS — M54.2 CERVICALGIA: ICD-10-CM

## 2021-08-24 DIAGNOSIS — S06.0X1A CONCUSSION WITH LOSS OF CONSCIOUSNESS OF 30 MINUTES OR LESS, INITIAL ENCOUNTER: Primary | ICD-10-CM

## 2021-08-24 PROCEDURE — 97112 NEUROMUSCULAR REEDUCATION: CPT | Performed by: PHYSICAL THERAPIST

## 2021-08-24 NOTE — TELEPHONE ENCOUNTER
Astrid Gomez work comp  called stating that a letter is needed that states the patient's script for Trok[employer] is a medical necessity. Astrid Gomez will also be faxing over a form that should tell us in depth what she is needing.

## 2021-08-24 NOTE — PROGRESS NOTES
"Physical Therapy Treatment Note    Patient: London Calabrese                                                                                     Visit Date: 2021  :     1985    Referring practitioner:    No ref. provider found  Date of Initial Visit:          Type: THERAPY  Noted: 2020    Patient seen for 71 sessions    Visit Diagnoses:    ICD-10-CM ICD-9-CM   1. Concussion with loss of consciousness of 30 minutes or less, initial encounter  S06.0X1A 850.11   2. Cervicalgia  M54.2 723.1   3. Head trauma, initial encounter  S09.90XA 959.01     SUBJECTIVE     Subjective He reports having to stop down the street after the last session. Tiny HA pain right now but \"nothing serious\" reports mild dizziness at rest.    PAIN: 2/10         OBJECTIVE     Objective      Neuromuscular Reeducation     93496 Comments   Walking with alt head turns counting by 3's, alternated alphabet, animals alphabetically, animal/vegetable/mineral    Toss and catch with [employer] print ball reciting 2's and 3's multiplication tables to 12 on each catch                Timed Minutes 45       Therapy Education/Self Care 17649   Details:    Given symptom relief   Progress: Reinforced   Education provided to:  Patient   Level of understanding Verbalized   Timed Minutes          Total Timed Treatment:     45   mins  Total Time of Visit:             45   mins         ASSESSMENT/PLAN     GOALS  Goals                                          Progress Note due by 21                                                             Recert 10/12/21     STG by: 3 weeks Comments Status Date   Pt will demonstrate 50 degrees or greater (B) cervical rotaiton   Met   3/23/21   LTG by: 8 weeks         Pt will demonstrate 65 degrees or greater (B) cervical rotaiton   Met 8/10/21   Pt will score 10 or less on the NDI   Met 8/10/21   Pt will be indpendent with HEP to include postural strengthening and vestibular exercises He is compliant with HEP  " Ongoing 8/20/21   Pt will report no dizziness with bending over for one week He continues to have an increased blink rate when we have him perform visual tracking activities that require him to look down  Ongoing 8/20/21   Pt will demonstrate SLS for 30 seconds or greater  9 seconds B LE   Ongoing 8/20/21   Pt to score <36 on the Dizziness Handicap Index  initial score 56,   8/10/21 60,   68 8/20/21  Ongoing 8/20/21         Assessment/Plan     ASSESSMENT: He did well maintaining his balance with dynamic balance activities with cognitive tasks this date as compared to the last few sessions.     PLAN: Continue to progress him with noxious visual stimuli and consider writing numbers and letters on a plain ball and have him identify mid flight.    Signature: Eusebio Gonzalez, PTA

## 2021-08-27 ENCOUNTER — TREATMENT (OUTPATIENT)
Dept: PHYSICAL THERAPY | Facility: CLINIC | Age: 36
End: 2021-08-27

## 2021-08-27 DIAGNOSIS — S06.0X1A CONCUSSION WITH LOSS OF CONSCIOUSNESS OF 30 MINUTES OR LESS, INITIAL ENCOUNTER: Primary | ICD-10-CM

## 2021-08-27 DIAGNOSIS — S09.90XA HEAD TRAUMA, INITIAL ENCOUNTER: ICD-10-CM

## 2021-08-27 DIAGNOSIS — M54.2 CERVICALGIA: ICD-10-CM

## 2021-08-27 PROCEDURE — 97112 NEUROMUSCULAR REEDUCATION: CPT | Performed by: PHYSICAL THERAPIST

## 2021-08-27 PROCEDURE — 97110 THERAPEUTIC EXERCISES: CPT | Performed by: PHYSICAL THERAPIST

## 2021-08-27 NOTE — PROGRESS NOTES
"Physical Therapy Treatment Note    Patient: London Calabrese                                                                                     Visit Date: 2021  :     1985    Referring practitioner:    Anthony Orona MD  Date of Initial Visit:          Type: THERAPY  Noted: 2020    Patient seen for 72 sessions    Visit Diagnoses:    ICD-10-CM ICD-9-CM   1. Concussion with loss of consciousness of 30 minutes or less, initial encounter  S06.0X1A 850.11   2. Cervicalgia  M54.2 723.1   3. Head trauma, initial encounter  S09.90XA 959.01     SUBJECTIVE     Subjective He denies HA, still reporting dizziness    PAIN: 0/10         OBJECTIVE     Objective      Therapeutic Exercises    45661 Comments   Unicam seat #5 res 4                     Timed Minutes 10     Neuromuscular Reeducation     28188 Comments   Standing on BOSU blue side up B handoffs, toss and catch with #2 ball in // bars    Stepping \"duck unders\" catch and toss with red tape line form one fitter stick to the other with Tactile Med ball progressed with closing distance then same with off throws, and toss \"duck unders\" and catch B latareal    Simultaneous toss and catch with orange and Tactile Med ball progressed to simultaneous bounce catch with blue and yellow bounce balls            Timed Minutes 45       Therapy Education/Self Care 05317   Details:    Given Home Exercise Program  symptom relief   Progress: Reinforced   Education provided to:  Patient   Level of understanding Verbalized   Timed Minutes          Total Timed Treatment:     55   mins  Total Time of Visit:             55   mins         ASSESSMENT/PLAN     GOALS  Goals                                          Progress Note due by 21                                                             Recert 10/12/21     STG by: 3 weeks Comments Status Date   Pt will demonstrate 50 degrees or greater (B) cervical rotaiton   Met   3/23/21   LTG by: 8 weeks         Pt will demonstrate " 65 degrees or greater (B) cervical rotaiton   Met 8/10/21   Pt will score 10 or less on the NDI   Met 8/10/21   Pt will be indpendent with HEP to include postural strengthening and vestibular exercises He is compliant with HEP  Ongoing 8/20/21   Pt will report no dizziness with bending over for one week He continues to have an increased blink rate when we have him perform visual tracking activities that require him to look down  Ongoing 8/20/21   Pt will demonstrate SLS for 30 seconds or greater  9 seconds B LE   Ongoing 8/20/21   Pt to score <36 on the Dizziness Handicap Index  initial score 56,   8/10/21 60,   68 8/20/21  Ongoing 8/20/21         Assessment/Plan     ASSESSMENT: Today was the first session we had him perform visual tracking with line on a plane combined and at first it was very hard for him but as we had him repeat it over time he improved and was ready to progress.    PLAN: Consider repeating many of today's activities and monitor.    Signature: Eusebio Gonzalez, PTA

## 2021-08-31 ENCOUNTER — TREATMENT (OUTPATIENT)
Dept: PHYSICAL THERAPY | Facility: CLINIC | Age: 36
End: 2021-08-31

## 2021-08-31 ENCOUNTER — TELEPHONE (OUTPATIENT)
Dept: NEUROSURGERY | Age: 36
End: 2021-08-31

## 2021-08-31 DIAGNOSIS — M54.2 CERVICALGIA: ICD-10-CM

## 2021-08-31 DIAGNOSIS — S06.0X1A CONCUSSION WITH LOSS OF CONSCIOUSNESS OF 30 MINUTES OR LESS, INITIAL ENCOUNTER: Primary | ICD-10-CM

## 2021-08-31 DIAGNOSIS — S09.90XA HEAD TRAUMA, INITIAL ENCOUNTER: ICD-10-CM

## 2021-08-31 PROCEDURE — 97110 THERAPEUTIC EXERCISES: CPT | Performed by: PHYSICAL THERAPIST

## 2021-08-31 PROCEDURE — 97112 NEUROMUSCULAR REEDUCATION: CPT | Performed by: PHYSICAL THERAPIST
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
## 2021-08-31 NOTE — TELEPHONE ENCOUNTER
1100: [place] here to  samples. Pt states he does not feel like workers comp wants to do anything that is helpful. I apologized for the delay and advised we would continue to try to get this approved, in the mean time let me know if he was short on medication so we could provide more samples. Patient given a month worth of medication (see sample log) and copy of letter that was faxed on 8/25. Pt voiced understanding. 1116: [employer] workers comp due to fax received requesting letter of medical ness. Left voicemail with call back number and ext. [phone]: Yandelne Momo called back. She was unaware  had sent formulary change request. I explained that I had faxed a letter of necessity on 8/25. I also stated Evan Monte has noted improvement from trokendi, we will not change this medication period. if that is what is needed then something else on the workers comp end will need to be change because there is no need to change a medication that is working, we have to do what is right for the patient regardless of the cost.\" João Barr voiced understanding and stated this is what she wanted to hear. She will reach out to  to make sure letter was received will call back if she needs anything else.

## 2021-09-02 ENCOUNTER — TREATMENT (OUTPATIENT)
Dept: PHYSICAL THERAPY | Facility: CLINIC | Age: 36
End: 2021-09-02

## 2021-09-02 DIAGNOSIS — M54.2 CERVICALGIA: ICD-10-CM

## 2021-09-02 DIAGNOSIS — S09.90XA HEAD TRAUMA, INITIAL ENCOUNTER: ICD-10-CM

## 2021-09-02 DIAGNOSIS — S06.0X1A CONCUSSION WITH LOSS OF CONSCIOUSNESS OF 30 MINUTES OR LESS, INITIAL ENCOUNTER: Primary | ICD-10-CM

## 2021-09-02 PROCEDURE — 97112 NEUROMUSCULAR REEDUCATION: CPT | Performed by: PHYSICAL THERAPIST

## 2021-09-02 PROCEDURE — 97110 THERAPEUTIC EXERCISES: CPT | Performed by: PHYSICAL THERAPIST

## 2021-09-02 NOTE — PROGRESS NOTES
Physical Therapy Treatment Note    Patient: London Calabrese                                                                                     Visit Date: 2021  :     1985    Referring practitioner:    Anthony Orona MD  Date of Initial Visit:          Type: THERAPY  Noted: 2020    Patient seen for 74 sessions    Visit Diagnoses:    ICD-10-CM ICD-9-CM   1. Concussion with loss of consciousness of 30 minutes or less, initial encounter  S06.0X1A 850.11   2. Cervicalgia  M54.2 723.1   3. Head trauma, initial encounter  S09.90XA 959.01     SUBJECTIVE     Subjective He denies pain including HA's, reports still having his usual dizziness.    PAIN: 0/10         OBJECTIVE     Objective      Therapeutic Exercises    34269 Comments   Walking on treatmill 2.0 mph with 6% incline with #20 vest                    Timed Minutes 10     Neuromuscular Reeducation     20421 Comments   Complex array compition mode numbers, words, letters, numbers and letters @ BITS    Toss and catch with rainbow print ball standing on theradisks and 1/2 foam roller flat side down progressed with off center throws and spirals    Rebound catches with Tactile Med ball with B side stepping reciting cities alphabetically, 3's times tables, counting down from 100, naming fruits and veggies alphabetically            Timed Minutes 44     Therapy Education/Self Care 92990   Details:    Given symptom relief  fall prevention   Progress: Reinforced   Education provided to:  Patient   Level of understanding Verbalized and Demonstrated   Timed Minutes          Total Timed Treatment:     54   mins  Total Time of Visit:             54   mins         ASSESSMENT/PLAN     GOALS  Goals                                          Progress Note due by 21                                                             Recert 10/12/21     STG by: 3 weeks Comments Status Date   Pt will demonstrate 50 degrees or greater (B) cervical rotaiton   Met   3/23/21  "  LTG by: 8 weeks         Pt will demonstrate 65 degrees or greater (B) cervical rotaiton   Met 8/10/21   Pt will score 10 or less on the NDI   Met 8/10/21   Pt will be indpendent with HEP to include postural strengthening and vestibular exercises He is compliant with HEP  Ongoing 8/20/21   Pt will report no dizziness with bending over for one week Addressed with dribbling and walking kickball activities today Ongoing 8/31/21   Pt will demonstrate SLS for 30 seconds or greater  9 seconds B LE   Ongoing 8/20/21   Pt to score <36 on the Dizziness Handicap Index  initial score 56,   8/10/21 60,   68 8/20/21  Ongoing         Assessment/Plan     ASSESSMENT: He had a little more difficulty maintaining tandem stance on the 1/2 foam roller today. He also continues to have an increased blink rate when given a noxious visual stimuli.    PLAN: Continue utilizing the treadmill as he reported feeling like, \"being on a roller coaster\" feeling during the last two minutes.    Signature: Eusebio Gonzalez, PTA    "

## 2021-09-07 ENCOUNTER — TREATMENT (OUTPATIENT)
Dept: PHYSICAL THERAPY | Facility: CLINIC | Age: 36
End: 2021-09-07

## 2021-09-07 DIAGNOSIS — S06.0X1A CONCUSSION WITH LOSS OF CONSCIOUSNESS OF 30 MINUTES OR LESS, INITIAL ENCOUNTER: Primary | ICD-10-CM

## 2021-09-07 DIAGNOSIS — S09.90XA HEAD TRAUMA, INITIAL ENCOUNTER: ICD-10-CM

## 2021-09-07 DIAGNOSIS — M54.2 CERVICALGIA: ICD-10-CM

## 2021-09-07 PROCEDURE — 97110 THERAPEUTIC EXERCISES: CPT | Performed by: PHYSICAL THERAPIST

## 2021-09-07 PROCEDURE — 97112 NEUROMUSCULAR REEDUCATION: CPT | Performed by: PHYSICAL THERAPIST

## 2021-09-07 NOTE — PROGRESS NOTES
Physical Therapy Treatment Note    Patient: London Calabrese                                                                                     Visit Date: 2021  :     1985    Referring practitioner:    Anthony Orona MD  Date of Initial Visit:          Type: THERAPY  Noted: 2020    Patient seen for 75 sessions    Visit Diagnoses:    ICD-10-CM ICD-9-CM   1. Concussion with loss of consciousness of 30 minutes or less, initial encounter  S06.0X1A 850.11   2. Cervicalgia  M54.2 723.1   3. Head trauma, initial encounter  S09.90XA 959.01     SUBJECTIVE     Subjective He reports he had a rough night last night, reports HA pain today     PAIN: 2/10         OBJECTIVE     Objective      Therapeutic Exercises    70324 Comments   Walking on treatmill 1.8 mph with 6% incline with #20 vest 11:29                             Timed Minutes 12     Neuromuscular Reeducation     85517 Comments   Toss and catch attempting to identify numbers and letters written on the ball    Toss and catch with green and black striped then same with black and red striped                Timed Minutes 18       Therapy Education/Self Care 21469   Details:    Given symptom relief   Progress: Reinforced   Education provided to:  Patient   Level of understanding Verbalized   Timed Minutes          Total Timed Treatment:     30   mins  Total Time of Visit:             30   mins         ASSESSMENT/PLAN     GOALS  Goals                                          Progress Note due by 21                                                             Recert 10/12/21     STG by: 3 weeks Comments Status Date   Pt will demonstrate 50 degrees or greater (B) cervical rotaiton   Met   3/23/21   LTG by: 8 weeks         Pt will demonstrate 65 degrees or greater (B) cervical rotaiton   Met 8/10/21   Pt will score 10 or less on the NDI   Met 8/10/21   Pt will be indpendent with HEP to include postural strengthening and vestibular exercises He is compliant  with HEP  Ongoing 8/20/21   Pt will report no dizziness with bending over for one week Addressed with dribbling and walking kickball activities today Ongoing 8/31/21   Pt will demonstrate SLS for 30 seconds or greater  9 seconds B LE   Ongoing 8/20/21   Pt to score <36 on the Dizziness Handicap Index  initial score 56,   8/10/21 60,   68 8/20/21  Ongoing           Assessment/Plan     ASSESSMENT: He was running late today which truncated today's session. He had HA pain today and had had disrupted sleep last night as well.    PLAN: Continue to progress as symptoms allow    Signature: Eusebio Gonzalez, PTA

## 2021-09-09 ENCOUNTER — TREATMENT (OUTPATIENT)
Dept: PHYSICAL THERAPY | Facility: CLINIC | Age: 36
End: 2021-09-09

## 2021-09-09 DIAGNOSIS — S09.90XA HEAD TRAUMA, INITIAL ENCOUNTER: ICD-10-CM

## 2021-09-09 DIAGNOSIS — M54.2 CERVICALGIA: ICD-10-CM

## 2021-09-09 DIAGNOSIS — S06.0X1A CONCUSSION WITH LOSS OF CONSCIOUSNESS OF 30 MINUTES OR LESS, INITIAL ENCOUNTER: Primary | ICD-10-CM

## 2021-09-09 PROCEDURE — 97112 NEUROMUSCULAR REEDUCATION: CPT | Performed by: PHYSICAL THERAPIST

## 2021-09-09 NOTE — PROGRESS NOTES
"Physical Therapy Treatment Note    Patient: London Calabrese                                                                                     Visit Date: 2021  :     1985    Referring practitioner:    Anthony Orona MD  Date of Initial Visit:          Type: THERAPY  Noted: 2020    Patient seen for 76 sessions    Visit Diagnoses:    ICD-10-CM ICD-9-CM   1. Concussion with loss of consciousness of 30 minutes or less, initial encounter  S06.0X1A 850.11   2. Cervicalgia  M54.2 723.1   3. Head trauma, initial encounter  S09.90XA 959.01     SUBJECTIVE     Subjective He reports he sees Dr. Finch 10/21/21    PAIN: 0/10         OBJECTIVE     Objective     Neuromuscular Reeducation     06163 Comments   Limits of Stability testing on [employer] Balance Master    Stepping \"duck unders\" catch and toss with red tape line form one fitter stick to the other with orange and green  ball progressed with closing distance then same with off throws, then same with handball and tennis ball                Timed Minutes 40     Therapy Education/Self Care 44170   Details:    Given fall prevention   Progress: New and Reinforced   Education provided to:  Patient   Level of understanding Verbalized   Timed Minutes          Total Timed Treatment:     40   mins  Total Time of Visit:             40   mins         ASSESSMENT/PLAN     GOALS  Goals                                          Progress Note due by 21                                                             Recert 10/12/21     STG by: 3 weeks Comments Status Date   Pt will demonstrate 50 degrees or greater (B) cervical rotaiton   Met   3/23/21   LTG by: 8 weeks         Pt will demonstrate 65 degrees or greater (B) cervical rotaiton   Met 8/10/21   Pt will score 10 or less on the NDI   Met 8/10/21   Pt will be indpendent with HEP to include postural strengthening and vestibular exercises He is compliant with HEP  Ongoing 21   Pt will report no dizziness with " bending over for one week Addressed with dribbling and walking kickball activities today Ongoing 8/31/21   Pt will demonstrate SLS for 30 seconds or greater  9 seconds B LE   Ongoing 8/20/21   Pt to score <36 on the Dizziness Handicap Index Assessed the effects of his dizziness on his balance with [employer] today  Ongoing  9/9/21         Assessment/Plan     ASSESSMENT: He has reduced his L WS from ML reaction time to within .03 seconds as compared to the R and he has had overall significant improvement in his test results as compared to 8/3/21 testing. His Rhythmic WS'ing results had some slight improvements but not as much in comparison to his Limits of Stability testing performed today.    PLAN: Continue to repeat and progress the interventions performed over the last five sessions.    Signature: Eusebio Gonzalez, PTA

## 2021-09-14 ENCOUNTER — TREATMENT (OUTPATIENT)
Dept: PHYSICAL THERAPY | Facility: CLINIC | Age: 36
End: 2021-09-14

## 2021-09-14 DIAGNOSIS — S09.90XA HEAD TRAUMA, INITIAL ENCOUNTER: ICD-10-CM

## 2021-09-14 DIAGNOSIS — S06.0X1A CONCUSSION WITH LOSS OF CONSCIOUSNESS OF 30 MINUTES OR LESS, INITIAL ENCOUNTER: Primary | ICD-10-CM

## 2021-09-14 DIAGNOSIS — M54.2 CERVICALGIA: ICD-10-CM

## 2021-09-14 PROCEDURE — 97112 NEUROMUSCULAR REEDUCATION: CPT | Performed by: PHYSICAL THERAPIST

## 2021-09-14 PROCEDURE — 97110 THERAPEUTIC EXERCISES: CPT | Performed by: PHYSICAL THERAPIST

## 2021-09-14 NOTE — PROGRESS NOTES
"Physical Therapy Treatment Note    Patient: London Calabrese                                                                                     Visit Date: 2021  :     1985    Referring practitioner:    Anthony Orona MD  Date of Initial Visit:          Type: THERAPY  Noted: 2020    Patient seen for 77 sessions    Visit Diagnoses:    ICD-10-CM ICD-9-CM   1. Concussion with loss of consciousness of 30 minutes or less, initial encounter  S06.0X1A 850.11   2. Cervicalgia  M54.2 723.1   3. Head trauma, initial encounter  S09.90XA 959.01     SUBJECTIVE     Subjective He denies HA pain reports his dizziness has been worse the last three day not sure why. Reports he is trying to read more and watch more T.V. and build his tolerance to both.    PAIN: 0/10         OBJECTIVE     Objective      Therapeutic Exercises    84107 Comments   Walking on treadmill with #20 vest 1.5 mph 6% incline                    Timed Minutes 10     Neuromuscular Reeducation     83784 Comments   Stepping \"duck unders\" catch and toss with  line form one fitter stick to the other with rainbow and kaiden dye  ball progressed with closing distance then same with off throws, then same with black and white, black and red, green and blue striped ball then same with over shoulder catches, catches from the sides and bounce catches                    Timed Minutes 45       Therapy Education/Self Care 26850   Details:    Given symptom relief   Progress: Reinforced   Education provided to:  Patient   Level of understanding Verbalized   Timed Minutes          Total Timed Treatment:     55  mins  Total Time of Visit:             55   mins         ASSESSMENT/PLAN     GOALS    Goals                                          Progress Note due by 21                                                             Recert 10/12/21     STG by: 3 weeks Comments Status Date   Pt will demonstrate 50 degrees or greater (B) cervical rotaiton   Met   3/23/21 "   LTG by: 8 weeks         Pt will demonstrate 65 degrees or greater (B) cervical rotaiton   Met 8/10/21   Pt will score 10 or less on the NDI   Met 8/10/21   Pt will be indpendent with HEP to include postural strengthening and vestibular exercises He is compliant with HEP  Ongoing 8/20/21   Pt will report no dizziness with bending over for one week Addressed with dribbling and walking kickball activities today Ongoing 8/31/21   Pt will demonstrate SLS for 30 seconds or greater  9 seconds B LE   Ongoing 8/20/21   Pt to score <36 on the Dizziness Handicap Index Reports increased dizziness over the last three days  Ongoing  9/14/21       Assessment/Plan     ASSESSMENT: We were able to progress to smaller geographic print balls with his visual tracking while bending over and moving side to side today although his blink rate increased exponentially. Due to the increased blink rate we had to take intermittent breaks but this was due to the combination of noxious visual stimuli and bending over.     PLAN: Review all goals for progress note    Signature: Eusebio Gonzalez, PTA

## 2021-09-16 ENCOUNTER — TREATMENT (OUTPATIENT)
Dept: PHYSICAL THERAPY | Facility: CLINIC | Age: 36
End: 2021-09-16

## 2021-09-16 DIAGNOSIS — S09.90XA HEAD TRAUMA, INITIAL ENCOUNTER: ICD-10-CM

## 2021-09-16 DIAGNOSIS — S06.0X1A CONCUSSION WITH LOSS OF CONSCIOUSNESS OF 30 MINUTES OR LESS, INITIAL ENCOUNTER: Primary | ICD-10-CM

## 2021-09-16 DIAGNOSIS — M54.2 CERVICALGIA: ICD-10-CM

## 2021-09-16 PROCEDURE — 97110 THERAPEUTIC EXERCISES: CPT | Performed by: PHYSICAL THERAPIST

## 2021-09-16 PROCEDURE — 97112 NEUROMUSCULAR REEDUCATION: CPT | Performed by: PHYSICAL THERAPIST

## 2021-09-16 NOTE — PROGRESS NOTES
Physical Therapy Treatment Note    Patient: London Calabrese                                                                                     Visit Date: 2021  :     1985    Referring practitioner:    Anthony Orona MD  Date of Initial Visit:          Type: THERAPY  Noted: 2020    Patient seen for 78 sessions    Visit Diagnoses:    ICD-10-CM ICD-9-CM   1. Concussion with loss of consciousness of 30 minutes or less, initial encounter  S06.0X1A 850.11   2. Cervicalgia  M54.2 723.1   3. Head trauma, initial encounter  S09.90XA 959.01     SUBJECTIVE     Subjective He reports continued issues with dizziness denies HA pain today    PAIN: 0/10         OBJECTIVE     Objective      Therapeutic Exercises    08811 Comments   SLS for max (see goals section)    Resisted B side stepping with TRX Rip Trainer x 40 ft x 2     Resisted gait with TRX Rip Trainer x 40 ft x 4            Timed Minutes 25     Neuromuscular Reeducation     89252 Comments   Visual tracking user paced, visual tracking with central fixation, visual pursuit, visual pursuit sequencing numbers then letters                     Timed Minutes 15     Therapy Education/Self Care 78313   Details:    Given Home Exercise Program   Progress: Reinforced   Education provided to:  Patient   Level of understanding Verbalized   Timed Minutes          Total Timed Treatment:     40   mins  Total Time of Visit:             40   mins         ASSESSMENT/PLAN     GOALS  Goals                                          Progress Note due by 10/12/21                                                              Recert 10/12/21     STG by: 3 weeks Comments Status Date   Pt will demonstrate 50 degrees or greater (B) cervical rotaiton   Met   3/23/21   LTG by: 8 weeks         Pt will demonstrate 65 degrees or greater (B) cervical rotaiton   Met 8/10/21   Pt will score 10 or less on the NDI   Met 8/10/21   Pt will be indpendent with HEP to include postural strengthening  and vestibular exercises He is compliant with HEP  Ongoing 9/16/21   Pt will report no dizziness with bending over for one week Dizziness daily Ongoing 9/16/21   Pt will demonstrate SLS for 30 seconds or greater  R LE 11 sec L LE 12  Ongoing 9/16/21   Pt to score <36 on the Dizziness Handicap Index 70 today  Ongoing 9/16/21         Assessment/Plan     ASSESSMENT: Initially his reaction time with Limits of Stability testing on the [employer] was greater than the normal values for his age, height, and diagnosis and ranged from 1.15 seconds to 1.1 on 8/3/21. However, testing data from 9/9/21exhibits improvements across the board and his reaction time reduced to .76 seconds to .9 (left) which the .9 seconds is just barely beyond normal values for his peer group. Endpoint max excursion values improved across the board as well and are either within or less than the normal values for his peer group. Unfortunately his DHI score continues to climb and I attribute this to the face that it has gone on so long and now that he no longer has daily HA pain he may notice the dizziness more as it is his last remaining symptom. His B SLS time trails improved today with B LE.    PLAN: Continue to utilize the geographic print balls and other noxious stimuli to address his dizziness.    Signature: Eusebio Gonzalez, PTA

## 2021-09-21 ENCOUNTER — TREATMENT (OUTPATIENT)
Dept: PHYSICAL THERAPY | Facility: CLINIC | Age: 36
End: 2021-09-21

## 2021-09-21 DIAGNOSIS — S09.90XA HEAD TRAUMA, INITIAL ENCOUNTER: ICD-10-CM

## 2021-09-21 DIAGNOSIS — M54.2 CERVICALGIA: ICD-10-CM

## 2021-09-21 DIAGNOSIS — S06.0X1A CONCUSSION WITH LOSS OF CONSCIOUSNESS OF 30 MINUTES OR LESS, INITIAL ENCOUNTER: Primary | ICD-10-CM

## 2021-09-21 PROCEDURE — 97112 NEUROMUSCULAR REEDUCATION: CPT | Performed by: PHYSICAL THERAPIST

## 2021-09-21 PROCEDURE — 97110 THERAPEUTIC EXERCISES: CPT | Performed by: PHYSICAL THERAPIST

## 2021-09-21 NOTE — PROGRESS NOTES
Physical Therapy Treatment Note    Patient: London Calabrese                                                                                     Visit Date: 2021  :     1985    Referring practitioner:    Anthony Orona MD  Date of Initial Visit:          Type: THERAPY  Noted: 2020    Patient seen for 79 sessions    Visit Diagnoses:    ICD-10-CM ICD-9-CM   1. Concussion with loss of consciousness of 30 minutes or less, initial encounter  S06.0X1A 850.11   2. Cervicalgia  M54.2 723.1   3. Head trauma, initial encounter  S09.90XA 959.01     SUBJECTIVE     Subjective He reports a little HA pain today and has some dizziness     PAIN: 2/10         OBJECTIVE     Objective     Therapeutic Exercises    92775 Comments   Walking on treadmill with #20 vest 1.8 mph 6% incline x 10 min    Push/pull #50 sled 100 ft                Timed Minutes 21     Neuromuscular Reeducation     27733 Comments   Matching cards standing on BOSU blue side down then same on Wave     B tandem stance on 1/2 foam both sides    Maintaining midline on gray foam            Timed Minutes 23     Therapy Education/Self Care 61054   Details:    Given postural retraining   Progress: Reinforced   Education provided to:  Patient   Level of understanding Verbalized   Timed Minutes          Total Timed Treatment:     44   mins  Total Time of Visit:             44   mins         ASSESSMENT/PLAN     GOALS  Goals                                          Progress Note due by 10/12/21                                                              Recert 10/12/21     STG by: 3 weeks Comments Status Date   Pt will demonstrate 50 degrees or greater (B) cervical rotaiton   Met   3/23/21   LTG by: 8 weeks         Pt will demonstrate 65 degrees or greater (B) cervical rotaiton   Met 8/10/21   Pt will score 10 or less on the NDI   Met 8/10/21   Pt will be indpendent with HEP to include postural strengthening and vestibular exercises He is compliant with HEP   Ongoing 9/16/21   Pt will report no dizziness with bending over for one week Dizziness daily Ongoing 9/16/21   Pt will demonstrate SLS for 30 seconds or greater  Tandem stance on 1/2 foam roller x 15 sec max multiple attempts  Ongoing 9/21/21   Pt to score <36 on the Dizziness Handicap Index 70 today  Ongoing 9/16/21         Assessment/Plan     ASSESSMENT: I avoided noxious visual stimuli today due to the rain because the extra visual stimuli with the windshield wipers and oncoming headlights make it difficult for him to drive home and increases his dizziness exponentially.    PLAN: Continue to utilize the geographic print balls and other noxious stimuli to address his dizziness.    Signature: Eusebio Gonzalez, PTA

## 2021-09-23 ENCOUNTER — TREATMENT (OUTPATIENT)
Dept: PHYSICAL THERAPY | Facility: CLINIC | Age: 36
End: 2021-09-23

## 2021-09-23 DIAGNOSIS — M54.2 CERVICALGIA: ICD-10-CM

## 2021-09-23 DIAGNOSIS — S09.90XA HEAD TRAUMA, INITIAL ENCOUNTER: ICD-10-CM

## 2021-09-23 DIAGNOSIS — S06.0X1A CONCUSSION WITH LOSS OF CONSCIOUSNESS OF 30 MINUTES OR LESS, INITIAL ENCOUNTER: Primary | ICD-10-CM

## 2021-09-23 PROCEDURE — 97112 NEUROMUSCULAR REEDUCATION: CPT | Performed by: PHYSICAL THERAPIST

## 2021-09-23 PROCEDURE — 97110 THERAPEUTIC EXERCISES: CPT | Performed by: PHYSICAL THERAPIST

## 2021-09-23 NOTE — PROGRESS NOTES
Physical Therapy Treatment Note    Patient: London Calabrese                                                                                     Visit Date: 2021  :     1985    Referring practitioner:    Anthony Orona MD  Date of Initial Visit:          Type: THERAPY  Noted: 2020    Patient seen for 80 sessions    Visit Diagnoses:    ICD-10-CM ICD-9-CM   1. Concussion with loss of consciousness of 30 minutes or less, initial encounter  S06.0X1A 850.11   2. Cervicalgia  M54.2 723.1   3. Head trauma, initial encounter  S09.90XA 959.01     SUBJECTIVE     Subjective Denies HA pain but reports dizziness is still an issue. Reports he was very dizzy after the last session and had to remain in the parking lot for several minutes after the last session    PAIN: 0/10         OBJECTIVE     Objective      Therapeutic Exercises    86545 Comments   Unicam seat#7 res 3 x 8 min                    Timed Minutes 8     Neuromuscular Reeducation     03634 Comments   Walking with 2 print pinwheel x 40 ft with it arms length then same with dim lighting walking towards brightly lit area then same with 3 print pinwheel    Walking with large pinwheel x 40 then same with dim lighting walking towards brightly lit area then same with large pinwheel                Timed Minutes 37       Therapy Education/Self Care 69003   Details:    Given Home Exercise Program   Progress: Reinforced   Education provided to:  Patient   Level of understanding Verbalized   Timed Minutes          Total Timed Treatment:     45   mins  Total Time of Visit:             45   mins         ASSESSMENT/PLAN     GOALS  Goals                                          Progress Note due by 10/12/21                                                              Recert 10/12/21     STG by: 3 weeks Comments Status Date   Pt will demonstrate 50 degrees or greater (B) cervical rotaiton   Met   3/23/21   LTG by: 8 weeks         Pt will demonstrate 65 degrees or  greater (B) cervical rotaiton   Met 8/10/21   Pt will score 10 or less on the NDI   Met 8/10/21   Pt will be indpendent with HEP to include postural strengthening and vestibular exercises Reinforced today  Ongoing 9/23/21   Pt will report no dizziness with bending over for one week Dizziness daily Ongoing 9/16/21   Pt will demonstrate SLS for 30 seconds or greater  Tandem stance on 1/2 foam roller x 15 sec max multiple attempts  Ongoing 9/21/21   Pt to score <36 on the Dizziness Handicap Index 70 today  Ongoing 9/16/21         Assessment/Plan     ASSESSMENT: Today since the weather was much better suited I increased the noxious stimuli exponentially and added a slight velocity to the challenge by having him walk at his tolerated pace with various pinwheels.    PLAN: Consider having him perform similar tasks such as walking in a dimly lit hallway with bright flashlight, also using small wand and yi blade applicator and progress in various ways such as adding head turns.    Signature: Eusebio Gonzalez, PTA

## 2021-09-28 ENCOUNTER — TREATMENT (OUTPATIENT)
Dept: PHYSICAL THERAPY | Facility: CLINIC | Age: 36
End: 2021-09-28

## 2021-09-28 DIAGNOSIS — M54.2 CERVICALGIA: ICD-10-CM

## 2021-09-28 DIAGNOSIS — S09.90XA HEAD TRAUMA, INITIAL ENCOUNTER: ICD-10-CM

## 2021-09-28 DIAGNOSIS — S06.0X1A CONCUSSION WITH LOSS OF CONSCIOUSNESS OF 30 MINUTES OR LESS, INITIAL ENCOUNTER: Primary | ICD-10-CM

## 2021-09-28 PROCEDURE — 97112 NEUROMUSCULAR REEDUCATION: CPT | Performed by: PHYSICAL THERAPIST

## 2021-09-28 NOTE — PROGRESS NOTES
"Physical Therapy Treatment Note    Patient: London Calabrese                                                                                     Visit Date: 2021  :     1985    Referring practitioner:    Anthony Orona MD  Date of Initial Visit:          Type: THERAPY  Noted: 2020    Patient seen for 81 sessions    Visit Diagnoses:    ICD-10-CM ICD-9-CM   1. Concussion with loss of consciousness of 30 minutes or less, initial encounter  S06.0X1A 850.11   2. Cervicalgia  M54.2 723.1   3. Head trauma, initial encounter  S09.90XA 959.01     SUBJECTIVE     Subjective He reports the increase in dizziness after the last session lasted for an hour. Then after vaughn his dizziness was \"steady but calmer\" than when he first got home. A little HA pain today 2/10    PAIN: 2/10         OBJECTIVE     Objective      Neuromuscular Reeducation     16532 Comments   Walking in dim light following flashlight first stationary then moving/tracking multi planes    Walking with tongue blade centerline @ arms reach then same with head turns    Toss and catch with football progressed with off throws and end over end and arc throws            Timed Minutes 45       Therapy Education/Self Care 50435   Details:    Given Home Exercise Program   Progress: Reinforced   Education provided to:  Patient   Level of understanding Verbalized   Timed Minutes          Total Timed Treatment:     45   mins  Total Time of Visit:             45   mins         ASSESSMENT/PLAN     GOALS  Goals                                          Progress Note due by 10/12/21                                                              Recert 10/12/21     STG by: 3 weeks Comments Status Date   Pt will demonstrate 50 degrees or greater (B) cervical rotaiton   Met   3/23/21   LTG by: 8 weeks         Pt will demonstrate 65 degrees or greater (B) cervical rotaiton   Met 8/10/21   Pt will score 10 or less on the NDI   Met 8/10/21   Pt will be indpendent with HEP " to include postural strengthening and vestibular exercises Reinforced today  Ongoing 9/28/21   Pt will report no dizziness with bending over for one week Dizziness daily Ongoing 9/16/21   Pt will demonstrate SLS for 30 seconds or greater  Tandem stance on 1/2 foam roller x 15 sec max multiple attempts  Ongoing 9/21/21   Pt to score <36 on the Dizziness Handicap Index 70 today  Ongoing 9/16/21         Assessment/Plan     ASSESSMENT: With the activities utilizing the flash light he would consistently lose his balance to the L and when I moved the light to his L his balance was significantly challenged more and his blink rate increased significantly as well. He remarked he has not tried driving at night since last fall.     PLAN: Review all remaining goals for continued authorization status and continue utilizing noxious stimuli with static and dynamic balance challenges.    Signature: Eusebio Gonzalez, PTA

## 2021-09-29 NOTE — TELEPHONE ENCOUNTER
Called Jese Rutherford with [employer]. Voiced that he still had not received his medication so another month of samples were given to him. I did voice this has been going on for some time now and there needed to be some resolution. Jese Rutherford advised that Pt trokendi had been approved that is why he got the card that is scanned into media yesterday. Jese Rutherford stated she would call the  and see what the next step would be to get him his medication and call me back. Voiced understanding.

## 2021-09-30 ENCOUNTER — TREATMENT (OUTPATIENT)
Dept: PHYSICAL THERAPY | Facility: CLINIC | Age: 36
End: 2021-09-30

## 2021-09-30 DIAGNOSIS — M54.2 CERVICALGIA: ICD-10-CM

## 2021-09-30 DIAGNOSIS — S06.0X1A CONCUSSION WITH LOSS OF CONSCIOUSNESS OF 30 MINUTES OR LESS, INITIAL ENCOUNTER: Primary | ICD-10-CM

## 2021-09-30 DIAGNOSIS — S09.90XA HEAD TRAUMA, INITIAL ENCOUNTER: ICD-10-CM

## 2021-09-30 PROCEDURE — 97112 NEUROMUSCULAR REEDUCATION: CPT | Performed by: PHYSICAL THERAPIST

## 2021-09-30 NOTE — PROGRESS NOTES
"Physical Therapy 90 Day Recertification Note    Patient: London Calabrese                                                                                     Visit Date: 2021  :     1985    Referring practitioner:    Anthony Orona MD  Date of Initial Visit:          Type: THERAPY  Noted: 2020    Patient seen for 82 sessions    Visit Diagnoses:    ICD-10-CM ICD-9-CM   1. Concussion with loss of consciousness of 30 minutes or less, initial encounter  S06.0X1A 850.11   2. Cervicalgia  M54.2 723.1   3. Head trauma, initial encounter  S09.90XA 959.01     SUBJECTIVE     Subjective He reports overall his HA's have improved and some things that used to make him \"very dizzy\" don't make him as dizzy as it used to    PAIN:0/10         OBJECTIVE     Objective      Neuromuscular Reeducation     83537 Comments   SLS for max (see goals section)    Stepping \"duck unders\" catch and toss with red tape line form one fitter stick to the other with football progressed with rainbow ball and then same with black and white, green and blue print balls    Walking in dim light following flashlight first stationary then moving/tracking multi planes            Timed Minutes 45       Therapy Education/Self Care 75179   Details:    Given Home Exercise Program  symptom relief   Progress: New and Reinforced   Education provided to:  Patient   Level of understanding Verbalized   Timed Minutes          Total Timed Treatment:     45   mins  Total Time of Visit:             45   mins         ASSESSMENT/PLAN     GOALS    Goals                                          Progress Note due by 10/30/21                                                              Recert 10/12/21     STG by: 3 weeks Comments Status Date   Pt will demonstrate 50 degrees or greater (B) cervical rotaiton   Met   3/23/21   LTG by: 8 weeks         Pt will demonstrate 65 degrees or greater (B) cervical rotaiton   Met 8/10/21   Pt will score 10 or less on the NDI   " Met 8/10/21   Pt will be indpendent with HEP to include postural strengthening and vestibular exercises Reinforced today  Ongoing 9/30/21   Pt will report no dizziness with bending over for one week Posterior LOB after :8.15 seconds bent over in standing today. Ongoing 9/30/21   Pt will demonstrate SLS for 30 seconds or greater  L LE 11 seconds, R LE 13 seconds today  Ongoing 9/30/21   Pt to score <36 on the Dizziness Handicap Index 70 today  Ongoing 9/30/21       Assessment/Plan     ASSESSMENT: Initially his reaction time with Limits of Stability testing on the [employer] was greater than the normal values for his age, height, and diagnosis and ranged from 1.15 seconds to 1.1 on 8/3/21. However, testing data from 9/9/21exhibits improvements across the board and his reaction time reduced to .76 seconds to .9 (left) which the .9 seconds is just barely beyond normal values for his peer group. Endpoint max excursion values improved across the board as well and are either within or less than the normal values for his peer group. He has been unable to drive at night due to the visual stimuli with the oncoming headlights. He also has difficulty with driving in the rain due to the visual stimuli. His DHI score still remains high but I would attribute this to two factors, one would be the duration that he has been having dizziness, and two he has been HA free for an extended periods and I believe his HA symptoms distracted him as to the severity of his dizziness.    PLAN: Continue to utilize the geographic print balls and other noxious stimuli such as flash lights and pinwheels to address his dizziness.    Signature: Eusebio Gonzalez, PTA

## 2021-09-30 NOTE — PROGRESS NOTES
Progress Note Addendum      Patient: London Calabrese           : 1985  Visit Date: 2021  Referring practitioner: Anthony Orona MD  Date of Initial Visit: Type: THERAPY  Noted: 2020  Patient seen for 82 sessions  Visit Diagnoses:    ICD-10-CM ICD-9-CM   1. Concussion with loss of consciousness of 30 minutes or less, initial encounter  S06.0X1A 850.11   2. Cervicalgia  M54.2 723.1   3. Head trauma, initial encounter  S09.90XA 959.01          Clinical Progress: improved  Home Program Compliance: Yes  Progress toward previous goals: Partially Met  Prognosis to achieve goals: good    Objective     Assessment & Plan     Assessment  Impairments: abnormal coordination, abnormal gait, activity intolerance and impaired balance  Prognosis: good  Functional Limitations: sleeping, walking, standing and stooping  Plan  Planned therapy interventions: manual therapy, balance/weight-bearing training, body mechanics training, neuromuscular re-education, motor coordination training, postural training, fine motor coordination training, soft tissue mobilization, spinal/joint mobilization, strengthening, functional ROM exercises, flexibility, gait training, stretching, therapeutic activities, home exercise program, IADL retraining and joint mobilization  Frequency: 2x week  Duration in visits: 8  Duration in weeks: 4  Treatment plan discussed with: PTA        I have reviewed the progress note information provided by Eusebio Gonzalez PTA, and I concur with the findings.    Juanjose Turcios, PT DPT  Physical Therapist

## 2021-10-05 ENCOUNTER — TREATMENT (OUTPATIENT)
Dept: PHYSICAL THERAPY | Facility: CLINIC | Age: 36
End: 2021-10-05

## 2021-10-05 DIAGNOSIS — S06.0X1A CONCUSSION WITH LOSS OF CONSCIOUSNESS OF 30 MINUTES OR LESS, INITIAL ENCOUNTER: Primary | ICD-10-CM

## 2021-10-05 DIAGNOSIS — M54.2 CERVICALGIA: ICD-10-CM

## 2021-10-05 DIAGNOSIS — S09.90XA HEAD TRAUMA, INITIAL ENCOUNTER: ICD-10-CM

## 2021-10-05 PROCEDURE — 97110 THERAPEUTIC EXERCISES: CPT | Performed by: PHYSICAL THERAPIST

## 2021-10-05 PROCEDURE — 97112 NEUROMUSCULAR REEDUCATION: CPT | Performed by: PHYSICAL THERAPIST

## 2021-10-05 NOTE — PROGRESS NOTES
"Physical Therapy 90 Day Recertification Note    Patient: London Calabrese                                                                                     Visit Date: 10/5/2021  :     1985    Referring practitioner:    Anthony Orona MD  Date of Initial Visit:          Type: THERAPY  Noted: 2020    Patient seen for 83 sessions    Visit Diagnoses:    ICD-10-CM ICD-9-CM   1. Concussion with loss of consciousness of 30 minutes or less, initial encounter  S06.0X1A 850.11   2. Cervicalgia  M54.2 723.1   3. Head trauma, initial encounter  S09.90XA 959.01     SUBJECTIVE     Subjective He reports overall his HA's have improved and some things that used to make him \"very dizzy\" don't make him as dizzy as it used to    PAIN:0/10         OBJECTIVE     Objective      Therapeutic Exercises    06434 Comments   unicam seat #6 resistance 3 x 8 min         Timed Minutes 8     Neuromuscular Reeducation     14900 Comments   Tall kneeling therapist throwing multicolored balls while pt hit them with 1lb t-bar for visual tracking and coordination    Hands and knees crawling to ball,  ball tall kneel and throw to therapist then crawl to next ball. Balls arranged in a diagonal pattern.    Walking with t-bar extended for focus point asterisk drawn on t-bar for point of focus while therapist bounced multi colored ball off wall. Progressed to using 2 multicolored balls.    Walking picking out point of focus in the distance while therapist threw tie dye balls off the wall in line of sigth        Timed Minutes 50       Therapy Education/Self Care 06396   Details:    Given Home Exercise Program  symptom relief   Progress: New and Reinforced   Education provided to:  Patient   Level of understanding Verbalized   Timed Minutes          Total Timed Treatment:     58   mins  Total Time of Visit:             58   mins         ASSESSMENT/PLAN     GOALS    Goals                                          Progress Note due " by 10/30/21                                                              Recert 10/12/21     STG by: 3 weeks Comments Status Date   Pt will demonstrate 50 degrees or greater (B) cervical rotaiton   Met   3/23/21   LTG by: 8 weeks         Pt will demonstrate 65 degrees or greater (B) cervical rotaiton   Met 8/10/21   Pt will score 10 or less on the NDI   Met 8/10/21   Pt will be indpendent with HEP to include postural strengthening and vestibular exercises Reinforced today  Ongoing 9/30/21   Pt will report no dizziness with bending over for one week Dizziness was increased when crawling to ball and transitioning into tall kneeling to throw Ongoing 10/5/21   Pt will demonstrate SLS for 30 seconds or greater  L LE 11 seconds, R LE 13 seconds today  Ongoing 9/30/21   Pt to score <36 on the Dizziness Handicap Index 70 today  Ongoing 9/30/21       Assessment/Plan     ASSESSMENT: He continues to have difficulty with positions that require his head down as this reproduces his dizziness. We utilized a focus point while walking along with the noxious stimuli of multi-colored balls bouncing off the wall in front of him. Initially with no focus point his dizziness increased and he was not able to maintain his balance. We utilized a close point at first with a asterisk drawn on a wand that he held out in front, he did well with this so we progressed to picking out a point in the distance. This really helped to maintain his balance and ambulate without causing severe dizziness. Dizziness was reproduced but it was much more manageable.     PLAN: Continue to utilize various patterns and colors for stimuli. Work on positions with head down. Obtain glow in the dark balls and work in the dark to challenge stimulus while driving at night.    Signature: Juanjose Turcios, PT DPT

## 2021-10-07 ENCOUNTER — TREATMENT (OUTPATIENT)
Dept: PHYSICAL THERAPY | Facility: CLINIC | Age: 36
End: 2021-10-07

## 2021-10-07 DIAGNOSIS — S06.0X1A CONCUSSION WITH LOSS OF CONSCIOUSNESS OF 30 MINUTES OR LESS, INITIAL ENCOUNTER: Primary | ICD-10-CM

## 2021-10-07 DIAGNOSIS — M54.2 CERVICALGIA: ICD-10-CM

## 2021-10-07 DIAGNOSIS — S09.90XA HEAD TRAUMA, INITIAL ENCOUNTER: ICD-10-CM

## 2021-10-07 PROCEDURE — 97112 NEUROMUSCULAR REEDUCATION: CPT | Performed by: PHYSICAL THERAPIST

## 2021-10-07 NOTE — PROGRESS NOTES
Physical Therapy Treatment Note    Patient: London Calabrese                                                                                     Visit Date: 10/7/2021  :     1985    Referring practitioner:    Anthony Orona MD  Date of Initial Visit:          Type: THERAPY  Noted: 2020    Patient seen for 84 sessions    Visit Diagnoses:    ICD-10-CM ICD-9-CM   1. Concussion with loss of consciousness of 30 minutes or less, initial encounter  S06.0X1A 850.11   2. Cervicalgia  M54.2 723.1   3. Head trauma, initial encounter  S09.90XA 959.01     SUBJECTIVE     Subjective Dizziness is increased a little more this date. He tried riding around at night again last night with an emphasis on focusing on one point he did ok until coming to a four way stop with lights coming from various directions which increased dizziness.    PAIN:0/10         OBJECTIVE     Objective      Neuromuscular Reeducation     65558 Comments   Standing in dark room tracking flashlight on wall,starting out with slow speed and then speeding up    Ambulating in the hallway focusing on flashlight out in front    Ambulation in hallway with blinking light light on and off    Ambulation while therapist walk past with light to mimic driving in the dark        Timed Minutes 50       Therapy Education/Self Care 24079   Details:    Given Home Exercise Program  symptom relief   Progress: New and Reinforced   Education provided to:  Patient   Level of understanding Verbalized   Timed Minutes          Total Timed Treatment:     45   mins  Total Time of Visit:             45   mins         ASSESSMENT/PLAN     GOALS    Goals                                          Progress Note due by 10/30/21                                                              Recert 10/12/21     STG by: 3 weeks Comments Status Date   Pt will demonstrate 50 degrees or greater (B) cervical rotaiton   Met   3/23/21   LTG by: 8 weeks         Pt will demonstrate 65 degrees or  greater (B) cervical rotaiton   Met 8/10/21   Pt will score 10 or less on the NDI   Met 8/10/21   Pt will be indpendent with HEP to include postural strengthening and vestibular exercises Reinforced today  Ongoing 9/30/21   Pt will report no dizziness with bending over for one week Dizziness was increased with lights out activity and movement of flashlight Ongoing 10/7/21   Pt will demonstrate SLS for 30 seconds or greater  L LE 11 seconds, R LE 13 seconds today  Ongoing 9/30/21   Pt to score <36 on the Dizziness Handicap Index 70 today  Ongoing 9/30/21       Assessment/Plan     ASSESSMENT: Today's session focused on moving/flashing light in the dark to simulate driving or riding in a car at night. He had difficulty with this as it brought on dizziness,LOB and he reports seeing streaks of light lasting for several seconds. He required a break to recover following activity.     PLAN: Obtain glow in the dark balls and work in the dark to challenge stimulus while driving at night. Continue to work in the dark with flashing lights to mimic driving    Signature: Juanjose Turcios, PT DPT

## 2021-10-12 ENCOUNTER — TREATMENT (OUTPATIENT)
Dept: PHYSICAL THERAPY | Facility: CLINIC | Age: 36
End: 2021-10-12

## 2021-10-12 DIAGNOSIS — S06.0X1A CONCUSSION WITH LOSS OF CONSCIOUSNESS OF 30 MINUTES OR LESS, INITIAL ENCOUNTER: Primary | ICD-10-CM

## 2021-10-12 DIAGNOSIS — M54.2 CERVICALGIA: ICD-10-CM

## 2021-10-12 DIAGNOSIS — S09.90XA HEAD TRAUMA, INITIAL ENCOUNTER: ICD-10-CM

## 2021-10-12 PROCEDURE — 97110 THERAPEUTIC EXERCISES: CPT | Performed by: PHYSICAL THERAPIST

## 2021-10-12 PROCEDURE — 97112 NEUROMUSCULAR REEDUCATION: CPT | Performed by: PHYSICAL THERAPIST

## 2021-10-12 NOTE — PROGRESS NOTES
Physical Therapy Treatment Note    Patient: London Calabrese                                                                                     Visit Date: 10/12/2021  :     1985    Referring practitioner:    Anthony Orona MD  Date of Initial Visit:          Type: THERAPY  Noted: 2020    Patient seen for 85 sessions    Visit Diagnoses:    ICD-10-CM ICD-9-CM   1. Concussion with loss of consciousness of 30 minutes or less, initial encounter  S06.0X1A 850.11   2. Cervicalgia  M54.2 723.1   3. Head trauma, initial encounter  S09.90XA 959.01     SUBJECTIVE     Subjective No changes since last visit. He tried riding Friday and  night and feels that his symptoms were getting worse. He walked last night and that went better than the riding.    PAIN:0/10         OBJECTIVE     Objective      Therapeutic Exercises    66732 Comments   Unicam seat lvl 3 resistance 4    Ambulation around hallway 600 ft    Timed Minutes 15 min     Neuromuscular Reeducation     64014 Comments   Standing in dark room catch and throw small glow in the dark ball    Standing in dark room tracking small glow in the dark ball thrown above and to the side of head    Standing in dark room tracking small glow in the dark balls thrown in front and to the side of his visual field     Timed Minutes 30       Therapy Education/Self Care 68651   Details:    Given Home Exercise Program  symptom relief   Progress: New and Reinforced   Education provided to:  Patient   Level of understanding Verbalized   Timed Minutes          Total Timed Treatment:     45   mins  Total Time of Visit:             45   mins         ASSESSMENT/PLAN     GOALS    Goals                                        Progress Note due by 10/30/21                                                              Recert 10/12/21     STG by: 3 weeks Comments Status Date   Pt will demonstrate 50 degrees or greater (B) cervical rotaiton   Met   3/23/21   LTG by: 8 weeks         Pt will  demonstrate 65 degrees or greater (B) cervical rotaiton   Met 8/10/21   Pt will score 10 or less on the NDI   Met 8/10/21   Pt will be indpendent with HEP to include postural strengthening and vestibular exercises Reinforced today  Ongoing 9/30/21   Pt will report no dizziness with bending over for one week Dizziness was increased with lights out activity and also lead to nauseas  Ongoing 10/12/21   Pt will demonstrate SLS for 30 seconds or greater  L LE 11 seconds, R LE 13 seconds today  Ongoing 9/30/21   Pt to score <36 on the Dizziness Handicap Index 70 today  Ongoing 9/30/21       Assessment/Plan     ASSESSMENT: We worked in the dark with glow in the dark balls with visual tracking and with the them thorn above him and to the sides of him. This really increased his dizziness and led to nausea requiring a rest break to calm down. We attempted ambulation with head turns following however ceased due to LOB and flared symptoms. We focused after this on walking trying to calm his symptoms and this helped dramatically. Ambulation around the halls with little stimulus helped to decrease his nausea and dizziness in just over 5 minutes quicker than usual when he has been sitting when these symptoms occur.    PLAN: Continue to work in the dark with glowing balls along with working with flashing lights. When dizziness occurs try ambulating in the hallway to decrease symptoms.    Signature: Juanjose Turcios, PT DPT

## 2021-10-14 ENCOUNTER — TREATMENT (OUTPATIENT)
Dept: PHYSICAL THERAPY | Facility: CLINIC | Age: 36
End: 2021-10-14

## 2021-10-14 DIAGNOSIS — S06.0X1A CONCUSSION WITH LOSS OF CONSCIOUSNESS OF 30 MINUTES OR LESS, INITIAL ENCOUNTER: Primary | ICD-10-CM

## 2021-10-14 DIAGNOSIS — M54.2 CERVICALGIA: ICD-10-CM

## 2021-10-14 DIAGNOSIS — S09.90XA HEAD TRAUMA, INITIAL ENCOUNTER: ICD-10-CM

## 2021-10-14 PROCEDURE — 97112 NEUROMUSCULAR REEDUCATION: CPT | Performed by: PHYSICAL THERAPIST

## 2021-10-14 PROCEDURE — 97110 THERAPEUTIC EXERCISES: CPT | Performed by: PHYSICAL THERAPIST

## 2021-10-14 NOTE — PROGRESS NOTES
Physical Therapy Treatment Note    Patient: London Caalbrese                                                                                     Visit Date: 10/14/2021  :     1985    Referring practitioner:    Anthony Orona MD  Date of Initial Visit:          Type: THERAPY  Noted: 2020    Patient seen for 86 sessions    Visit Diagnoses:    ICD-10-CM ICD-9-CM   1. Concussion with loss of consciousness of 30 minutes or less, initial encounter  S06.0X1A 850.11   2. Cervicalgia  M54.2 723.1   3. Head trauma, initial encounter  S09.90XA 959.01     SUBJECTIVE     Subjective He has been somewhat flared since his last session. The dizziness post session went away, however he did have a migraine that lasted a few hours with the medication not resolving it. Dizziness is not present today but he still has remnants of a headache.    PAIN:3/10         OBJECTIVE     Objective      Therapeutic Exercises    57399 Comments   Ambulation in rincon as needed after exercises to assist in decreasing dizziness  5 min    Ambulation on treadmill 2 mph 10 min   Timed Minutes 15 min     Neuromuscular Reeducation     46766 Comments   Crawl out with arms down to floor, push up and then crawl up 1 x 10   Supine throw ball and catch from various angles including directly above head    Tall kneeling catching and throwing multi-colored ball from various angles     Standing fielding ground balls            Timed Minutes 30       Therapy Education/Self Care 29933   Details:    Given Home Exercise Program  symptom relief   Progress: New and Reinforced   Education provided to:  Patient   Level of understanding Verbalized   Timed Minutes          Total Timed Treatment:     45   mins  Total Time of Visit:             45   mins         ASSESSMENT/PLAN     GOALS    Goals                                        Progress Note due by 10/30/21                                                              Recert 10/12/21     STG by: 3 weeks Comments  Status Date   Pt will demonstrate 50 degrees or greater (B) cervical rotaiton   Met   3/23/21   LTG by: 8 weeks         Pt will demonstrate 65 degrees or greater (B) cervical rotaiton   Met 8/10/21   Pt will score 10 or less on the NDI   Met 8/10/21   Pt will be indpendent with HEP to include postural strengthening and vestibular exercises Reinforced today  Ongoing 9/30/21   Pt will report no dizziness with bending over for one week Dizziness was increased with lights out activity and also lead to nauseas  Ongoing 10/12/21   Pt will demonstrate SLS for 30 seconds or greater  L LE 11 seconds, R LE 13 seconds today  Ongoing 9/30/21   Pt to score <36 on the Dizziness Handicap Index 70 today  Dizziness increased with activities looking down  Ongoing 10/14/21       Assessment/Plan     ASSESSMENT: He had a migraine following our last session that persisted for a few hours and then a headache has lingered since. We avoided any activities in the dark or with lights this date. We focused on tasks involving changing head and visual perceptions. His headache did not increase, dizziness did increase but was resolved post session after ambulation assisted in calming down symptoms.    PLAN: Continue to work in the dark with glowing balls along with working with flashing lights. When dizziness occurs try ambulating in the hallway to decrease symptoms. We may have to alter this every other session as he was very flared after. Continue to use noxious stimuli to reproduce symptoms and work in changing head and eye levels.    Signature: Juanjose Turcios, PT DPT

## 2021-10-19 ENCOUNTER — TREATMENT (OUTPATIENT)
Dept: PHYSICAL THERAPY | Facility: CLINIC | Age: 36
End: 2021-10-19

## 2021-10-19 DIAGNOSIS — M54.2 CERVICALGIA: ICD-10-CM

## 2021-10-19 DIAGNOSIS — S09.90XA HEAD TRAUMA, INITIAL ENCOUNTER: ICD-10-CM

## 2021-10-19 DIAGNOSIS — S06.0X1A CONCUSSION WITH LOSS OF CONSCIOUSNESS OF 30 MINUTES OR LESS, INITIAL ENCOUNTER: Primary | ICD-10-CM

## 2021-10-19 PROCEDURE — 97112 NEUROMUSCULAR REEDUCATION: CPT | Performed by: PHYSICAL THERAPIST

## 2021-10-19 NOTE — PROGRESS NOTES
Physical Therapy Treatment Note    Patient: London Calabrese                                                                     Visit Date: 10/19/2021  :     1985    Referring practitioner:    Anthony Orona MD  Date of Initial Visit:          Type: THERAPY  Noted: 2020    Patient seen for 87 sessions    Visit Diagnoses:    ICD-10-CM ICD-9-CM   1. Concussion with loss of consciousness of 30 minutes or less, initial encounter  S06.0X1A 850.11   2. Cervicalgia  M54.2 723.1   3. Head trauma, initial encounter  S09.90XA 959.01     SUBJECTIVE     Subjective He denies pain, HA, reports still having dizziness     PAIN: 0/10         OBJECTIVE     Objective     Therapeutic Exercises    31996 Comments   Walking on treadmill 2.0 mph x 8 min                    Timed Minutes 8     Neuromuscular Reeducation     29918 Comments   Walking tracking various size and brightness lights to from tinted screen L<>ML<>R then progressed to same with turning light on/off and increasing speed and frequency as tolerated                    Timed Minutes 37     Therapy Education/Self Care 79568   Details:    Given symptom relief   Progress: New and Reinforced   Education provided to:  Patient   Level of understanding Verbalized   Timed Minutes          Total Timed Treatment:     45   mins  Total Time of Visit:             45   mins         ASSESSMENT/PLAN     GOALS  Goals                                        Progress Note due by 10/30/21                                                              Recert 21     STG by: 3 weeks Comments Status Date   Pt will demonstrate 50 degrees or greater (B) cervical rotaiton   Met   3/23/21   LTG by: 8 weeks         Pt will demonstrate 65 degrees or greater (B) cervical rotaiton   Met 8/10/21   Pt will score 10 or less on the NDI   Met 8/10/21   Pt will be indpendent with HEP to include postural strengthening and vestibular  exercises Reinforced today  Ongoing 9/30/21   Pt will report no dizziness with bending over for one week Dizziness was increased with lights out activity and also lead to nauseas  Ongoing 10/12/21   Pt will demonstrate SLS for 30 seconds or greater  L LE 11 seconds, R LE 13 seconds today  Ongoing 9/30/21   Pt to score <36 on the Dizziness Handicap Index 70 today  Dizziness increased with activities looking down  Ongoing 10/14/21         Assessment/Plan     ASSESSMENT: He was very challenged with today's activities and we will need to repeat them during his next session if floor traffic allows.    PLAN: Continue to address his inability to drive or ride in a car at night.    Signature: Eusebio Gonzalez, PTA

## 2021-10-21 ENCOUNTER — TREATMENT (OUTPATIENT)
Dept: PHYSICAL THERAPY | Facility: CLINIC | Age: 36
End: 2021-10-21

## 2021-10-21 ENCOUNTER — TELEPHONE (OUTPATIENT)
Dept: NEUROSURGERY | Age: 36
End: 2021-10-21

## 2021-10-21 ENCOUNTER — OFFICE VISIT (OUTPATIENT)
Dept: NEUROSURGERY | Age: 36
End: 2021-10-21
Payer: COMMERCIAL

## 2021-10-21 VITALS
BODY MASS INDEX: 26.66 KG/M2 | HEIGHT: 69 IN | HEART RATE: 84 BPM | SYSTOLIC BLOOD PRESSURE: 139 MMHG | DIASTOLIC BLOOD PRESSURE: 97 MMHG | TEMPERATURE: 98.3 F | WEIGHT: 180 LBS | OXYGEN SATURATION: 100 %

## 2021-10-21 DIAGNOSIS — S09.90XA HEAD TRAUMA, INITIAL ENCOUNTER: ICD-10-CM

## 2021-10-21 DIAGNOSIS — F07.81 POST CONCUSSION SYNDROME: ICD-10-CM

## 2021-10-21 DIAGNOSIS — S06.0X1A CONCUSSION WITH LOSS OF CONSCIOUSNESS OF 30 MINUTES OR LESS, INITIAL ENCOUNTER: Primary | ICD-10-CM

## 2021-10-21 DIAGNOSIS — M54.2 CERVICALGIA: ICD-10-CM

## 2021-10-21 DIAGNOSIS — R42 DIZZINESS: ICD-10-CM

## 2021-10-21 DIAGNOSIS — G43.009 MIGRAINE WITHOUT AURA AND WITHOUT STATUS MIGRAINOSUS, NOT INTRACTABLE: Primary | ICD-10-CM

## 2021-10-21 PROCEDURE — 99213 OFFICE O/P EST LOW 20 MIN: CPT | Performed by: NURSE PRACTITIONER

## 2021-10-21 PROCEDURE — 97535 SELF CARE MNGMENT TRAINING: CPT | Performed by: PHYSICAL THERAPIST

## 2021-10-21 PROCEDURE — 97112 NEUROMUSCULAR REEDUCATION: CPT | Performed by: PHYSICAL THERAPIST

## 2021-10-21 RX ORDER — MECLIZINE HYDROCHLORIDE 25 MG/1
25 TABLET ORAL 3 TIMES DAILY PRN
Qty: 30 TABLET | Refills: 1 | Status: SHIPPED | OUTPATIENT
Start: 2021-10-21 | End: 2022-04-29 | Stop reason: SDUPTHER

## 2021-10-21 RX ORDER — TOPIRAMATE 100 MG/1
100 CAPSULE, EXTENDED RELEASE ORAL DAILY
Qty: 30 CAPSULE | Refills: 3 | Status: SHIPPED | OUTPATIENT
Start: 2021-10-21 | End: 2022-04-29 | Stop reason: SDUPTHER

## 2021-10-21 NOTE — PROGRESS NOTES
Physical Therapy Treatment Note    Patient: London Calabrese                                                                     Visit Date: 10/21/2021  :     1985    Referring practitioner:    Anthony Orona MD  Date of Initial Visit:          Type: THERAPY  Noted: 2020    Patient seen for 88 sessions    Visit Diagnoses:    ICD-10-CM ICD-9-CM   1. Concussion with loss of consciousness of 30 minutes or less, initial encounter  S06.0X1A 850.11   2. Cervicalgia  M54.2 723.1   3. Head trauma, initial encounter  S09.90XA 959.01     SUBJECTIVE     Subjective He reports he had a follow up with Dr. Finch and he will begin Meclizine along with the Trokendi    PAIN: 0/10         OBJECTIVE     Objective      Neuromuscular Reeducation     98200 Comments   Long distance catch and throws with rainbow and kaiden dye pattern balls.    Walking tracking various size and brightness lights to from tinted screen L<>ML<>R then progressed to same with turning light on/off and increasing speed and frequency as tolerated                Timed Minutes 30       Therapy Education/Self Care 50906   Details: At length discussion regarding today's follow appointment with Dr. Finch, his new medication, POC management and symptom management   Given Home Exercise Program  symptom relief   Progress: Reinforced   Education provided to:  Patient   Level of understanding Verbalized   Timed Minutes          Total Timed Treatment:     40   mins  Total Time of Visit:             40   mins         ASSESSMENT/PLAN     GOALS  Goals                                        Progress Note due by 10/30/21                                                              Recert 21     STG by: 3 weeks Comments Status Date   Pt will demonstrate 50 degrees or greater (B) cervical rotaiton   Met   3/23/21   LTG by: 8 weeks         Pt will demonstrate 65 degrees or greater (B) cervical rotaiton    Met 8/10/21   Pt will score 10 or less on the NDI   Met 8/10/21   Pt will be indpendent with HEP to include postural strengthening and vestibular exercises Reinforced today  Ongoing 9/30/21   Pt will report no dizziness with bending over for one week Dizziness was increased with lights out activity and also lead to nauseas  Ongoing 10/12/21   Pt will demonstrate SLS for 30 seconds or greater  L LE 11 seconds, R LE 13 seconds today  Ongoing 9/30/21   Pt to score <36 on the Dizziness Handicap Index 70 today  Dizziness increased with activities looking down  Ongoing 10/14/21         Assessment/Plan     ASSESSMENT: He is still improving and we were able to progress the distance and speed with the noxious visual stimuli with the light.    PLAN: Continue to address his inability to drive or ride in a car at night.    Signature: Eusebio Gonzalez, PTA          [address] [place] [address]  [phone]

## 2021-10-21 NOTE — PROGRESS NOTES

## 2021-10-21 NOTE — PROGRESS NOTES
[facility] Neurology Office Note      Patient:   Arnoldo Ashton  MR#:    [id number]  Account Number:                         YOB: 1985  Date of Evaluation:  10/21/2021  Time of Note:                          10:39 AM  Primary/Referring Physician:  No primary care provider on file. Consulting Physician:  Jessica Hawthorne, DNP, APRN    FOLLOW UP    Chief Complaint   Patient presents with    3 Month Follow-Up     pt states things are good, diziness is a little worse triggered by light    Migraine    Dizziness       HISTORY OF PRESENT ILLNESS    Arnoldo Ashton is a 43y.o. year old male here for follow up of headaches. He has noted improvement in headaches since starting Trokendi. Noting mild headaches intermittently but no clear migraines. Prior headache described as bilateral, band like sensation over the occipital/posterior areas of his head. He has more recently noted a left sided, retro orbital pain. Pain is described as a lightening bolt through his head. He notes light/sound sensitivity, nausea with headaches. He notes intermittent dizziness with headaches. Might note intermittent \"flashing\" type visual changes but does not always correlate these with headaches, occurs more with something coming into his visual field (like a ball being thrown to him). He will note numbness in his right arm with more severe headaches. No weakness. Watching TV, bending over makes headaches worse. Still noting intermittent dizziness. Seems like lights will trigger dizziness. Has not tried Meclizine. In PT, primarily for neck pain but doing exercises for dizziness as well. Symptoms began after a work accident in March 2020. A metal pipe hit him on the top of his head, knocked out 4 teeth, had LOC with event. Has seen [facility] neurosurgery for neck pain. History reviewed. No pertinent past medical history. Past Surgical History:   Procedure Laterality Date    DENTAL SURGERY         History reviewed.  No pertinent family history. Social History     Socioeconomic History    Marital status: Single     Spouse name: Not on file    Number of children: Not on file    Years of education: Not on file    Highest education level: Not on file   Occupational History    Not on file   Tobacco Use    Smoking status: Current Every Day Smoker     Packs/day: 0.50     Types: Cigarettes    Smokeless tobacco: Never Used   Vaping Use    Vaping Use: Never used   Substance and Sexual Activity    Alcohol use: Not Currently    Drug use: Not Currently    Sexual activity: Not on file   Other Topics Concern    Not on file   Social History Narrative    Not on file     Social Determinants of Health     Financial Resource Strain:     Difficulty of Paying Living Expenses:    Food Insecurity:     Worried About Running Out of Food in the Last Year:     Willis of Food in the Last Year:    Transportation Needs:     Lack of Transportation (Medical):  Lack of Transportation (Non-Medical):    Physical Activity:     Days of Exercise per Week:     Minutes of Exercise per Session:    Stress:     Feeling of Stress :    Social Connections:     Frequency of Communication with Friends and Family:     Frequency of Social Gatherings with Friends and Family:     Attends Anab[employer]ist Services:     Active Member of Clubs or Organizations:     Attends Club or Organization Meetings:     Marital Status:    Intimate Partner Violence:     Fear of Current or Ex-Partner:     Emotionally Abused:     Physically Abused:     Sexually Abused:        Current Outpatient Medications   Medication Sig Dispense Refill    meclizine (ANTIVERT) 25 MG tablet Take 1 tablet by mouth 3 times daily as needed for Dizziness 30 tablet 1    TROKENDI  MG CP24 Take 100 mg by mouth daily 30 capsule 3    SUMAtriptan (IMITREX) 50 MG tablet Take 1 tablet at the onset of migraine. Can repeat once in 2 hours if no improvement. Do not exceed 2 tablets in 24 hours.  9 tablet 3     No current facility-administered medications for this visit. No Known Allergies    REVIEW OF SYSTEMS  Constitutional: []? Fever []? Sweats []? Chills []? Recent Injury [x]? Denies all unless marked  HEENT:[x]? Headache  []? Head Injury []? Hearing Loss  []? Sore Throat  []? Ear Ache [x]? Denies all unless marked  Spine:  []? Neck pain  []? Back pain  []? Sciaticia  [x]? Denies all unless marked  Cardiovascular:[]? Heart Disease []? Palpitations []? Chest Pain   [x]? Denies all unless marked  Pulmonary: []? Shortness of Breath []? Cough   [x]? Denies all unless marked  Psychiatric/Behavioral:[]? Depression []? Anxiety [x]? Denies all unless marked  Gastrointestinal: []? Nausea  []? Vomiting  []? Abdominal Pain  []? Constipation  []? Diarrhea  [x]? Denies all unless marked  Genitourinary:   []? Frequency  []? Urgency  []? Dysuria []? Incontinence  [x]? Denies all unless marked  Extremities: []? Pain  []? Swelling  [x]? Denies all unless marked  Musculoskeletal: []? Myalgias  []? Joint Pain  []? Arthritis []? Muscle Cramps []? Muscle Twitches  [x]? Denies all unless marked  Sleep: []? Insomnia[]? Snoring []? Restless Legs  []? Sleep Apnea  []? Daytime Sleepiness  [x]? Denies all unless marked  Skin:[]? Rash []? Color Change [x]? Denies all unless marked   Neurological:[]? Visual Disturbance []? Memory Loss []? Loss of Balance []? Slurred Speech []? Weakness []? Seizures  [x]? Dizziness [x]? Denies all unless marked    The MA has completed the ROS with the patient. I have reviewed it in its' entirety with the patient and agree with the documentation.      PHYSICAL EXAM  BP (!) 139/97   Pulse 84   Temp 98.3 °F (36.8 °C)   Ht 5' 9\" (1.753 m)   Wt 180 lb (81.6 kg)   SpO2 100%   BMI 26.58 kg/m²       Constitutional - No acute distress    HEENT- Conjunctiva normal.  No scars, masses, or lesions over external nose or ears, no neck masses noted, no jugular vein distension, no bruit  Cardiac- Regular rate and rhythm  Pulmonary- Good expansion, normal effort without use of accessory muscles  Musculoskeletal - No significant wasting of muscles noted, no bony deformities  Extremities - No clubbing, cyanosis or edema  Skin - Warm, dry, and intact. No rash, erythema, or pallor  Psychiatric - Mood, affect, and behavior appear normal      NEUROLOGICAL EXAM     Mental status   [x] Awake, alert, oriented   [x]Affect attention and concentration appear appropriate  [x]Recent and remote memory appears unremarkable  [x]Speech normal without dysarthria or aphasia, comprehension and repetition intact. COMMENTS:    Cranial Nerves [x]No VF deficit to confrontation,  no papilledema on fundoscopic exam.  [x]PERRLA, EOMI, no nystagmus, conjugate eye movements, no ptosis  [x]Face symmetric  [x]Facial sensation intact  [x]Tongue midline no atrophy or fasciculations present  [x]Palate midline, hearing to finger rub normal bilaterally  [x]Shoulder shrug and SCM testing normal bilaterally  COMMENTS:   Motor   [x]5/5 strength x 4 extremities  [x]Normal bulk and tone  [x]No tremor present  [x]No rigidity or bradykinesia noted  COMMENTS:   Sensory  [x]Sensation intact to light touch, pin prick, vibration, and proprioception BLE  [x]Sensation intact to light touch, pin prick, vibration, and proprioception BUE  COMMENTS:   Coordination [x]FTN normal bilaterally   [x]HTS normal bilaterally  [x]KAI normal bilaterally.    COMMENTS:   Reflexes  [x]Symmetric and non-pathological  [x]Toes down going bilaterally  [x]No clonus present  COMMENTS:   Gait                  [x]Normal steady gait    []Ataxic    []Spastic     []Magnetic     []Shuffling  COMMENTS:       LABS RECORD AND IMAGING REVIEW (As below and per HPI)    No results found for: [id number]  No results found for: WBC, HGB, HCT, MCV, PLT  No results found for: NA, K, CL, CO2, BUN, CREATININE, GLUCOSE, CALCIUM, PROT, LABALBU, BILITOT, ALKPHOS, AST, ALT, LABGLOM, GFRAA, AGRATIO, GLOB  No results found for: CHOL, TRIG, HDL, LDLCALC  No results found for: TSH, T4FREE  No results found for: CRP, SEDRATE     MRI brain (6/2020)- normal     MRA head (5/2021)- normal     MRI cervical spine (6/2020)- mild DDD, moderate NF narrowing at C3/4 and C6/7    Reviewed referral records     ASSESSMENT:    Janelle Shah is a 43y.o. year old male here for follow up of headaches and concussion. Has noted improvement in headaches sine adding Trokendi. Still noting dizziness however. Exam today is non focal. MRI brain normal. MRA head normal. Headaches and dizziness began after hitting his head on a large metal pipe at work, LOC noted. Suspect post concussive in nature. He is in PT and would recommend he continue this. Will add Meclizine today as well. Given continued dizziness will hold off on returning to work for now, in PT. Will re evaluate at next visit. ICD-10-CM    1. Migraine without aura and without status migrainosus, not intractable  G43.009 TROKENDI  MG CP24   2. Post concussion syndrome  F07.81    3. Dizziness  R42        PLAN:  1. Continue Trokendi XR 100mg daily. Discussed side effects including renal stones, paresthesias and cognitive changes. 2. Continue Imitrex prn   3. Meclizine prn   4. Continue PT   5. Off work given persistent dizziness, letter given to patient   6. Continue follow up with neurosurgery as previously scheduled   7. Return in about 3 months (around 1/21/2022) for follow up, sooner if worsening.     Kailey Lundberg DNP, APRN

## 2021-10-21 NOTE — TELEPHONE ENCOUNTER
1105: Called [facility] and robert spoke with Liz guerra adjustor. Explained pt was still having on going issues picking up his medication. She informed me she was not this case adjustor but attempted to help. She was unable to get any answer and stated she would have the case adjustor Jolie call me. 1200: Jolie called back. Away from desk at this time. 1405: Called Jolie back. No answer left voicemail for her to return call.     1502: Morgan Garza returned at this time. She states \"Pt has been taking medication, so I dont know what you are meanig. \" I explained this is correct, however, he has been trying to get it from his pharmacy since April and has be unable to do so, so the medication he is taking is from our office as samples for the last 6 months. Adjustor states she was unaware of this however it is noted in several notes in this patients chart there have been ongoing issues with this since April 2021, as I have personally spoke with Anne Guevara and Nurse  Nadya pérez on numerous occasion. . I voiced this is frustrating to the patient as well as the providing office and there has to be some kind of resolution ASAP. Adjustor stated she does not usually handle the medication as her tech does so. she herself didn't know what was going on. I voiced this was unacceptable answer and a delay of care for the patient and I would need a resolution answer. She voiced that medications are set up from [facility] who then gets in contact with the pharmacy and she does not see that this has been set up previously, so this was done while we were on the phone. She states [facility] [address] in [facility] to get the medication taken care of. Will be following this closely due to ongoing issue. I voiced understanding and stated I would speak with pt and pharmacy myself. Called pt 1530: Explained in detail the conversation above.  Pt states he will talk to pharmacy and give me a call if there are any issues picking up this medication.

## 2021-10-21 NOTE — LETTER
[facility]  [address] [address]  Phone: [phone]  Fax: 607 Alayna Gonzales, APRN        October 21, 2021    To whom  it may concern,     Eamon Ferrer is seen and treated in our clinic on 10/21/2021. It is in my medical opinion that patient remain off work given persistent dizziness. If you have any questions please do not hesitate to reach out to our office at [phone].          Sincerely,        DURAN Shaikh

## 2021-10-26 ENCOUNTER — TREATMENT (OUTPATIENT)
Dept: PHYSICAL THERAPY | Facility: CLINIC | Age: 36
End: 2021-10-26

## 2021-10-26 DIAGNOSIS — S09.90XA HEAD TRAUMA, INITIAL ENCOUNTER: ICD-10-CM

## 2021-10-26 DIAGNOSIS — M54.2 CERVICALGIA: ICD-10-CM

## 2021-10-26 DIAGNOSIS — S06.0X1A CONCUSSION WITH LOSS OF CONSCIOUSNESS OF 30 MINUTES OR LESS, INITIAL ENCOUNTER: Primary | ICD-10-CM

## 2021-10-26 PROCEDURE — 97112 NEUROMUSCULAR REEDUCATION: CPT | Performed by: PHYSICAL THERAPIST

## 2021-10-26 PROCEDURE — 97110 THERAPEUTIC EXERCISES: CPT | Performed by: PHYSICAL THERAPIST

## 2021-10-26 NOTE — PROGRESS NOTES
Physical Therapy Treatment Note    Patient: London Calabrese                                                                     Visit Date: 10/26/2021  :     1985    Referring practitioner:    Anthony Orona MD  Date of Initial Visit:          Type: THERAPY  Noted: 2020    Patient seen for 89 sessions    Visit Diagnoses:    ICD-10-CM ICD-9-CM   1. Concussion with loss of consciousness of 30 minutes or less, initial encounter  S06.0X1A 850.11   2. Cervicalgia  M54.2 723.1   3. Head trauma, initial encounter  S09.90XA 959.01     SUBJECTIVE     Subjective He reports he didn't start the Meclizine til yesterday, feels very sleepy today, doesn't notice any change in the dizziness yet.     PAIN: 0/10         OBJECTIVE     Objective   .  Therapeutic Exercises    50383 Comments   Unicam seat # 3 res 4 x 10 min                    Timed Minutes 10     Neuromuscular Reeducation     54863 Comments   Walking following lights behind tinted screen of various size and brightness going from well lighted hallway to dimly lit hallway.                    Timed Minutes 26       Therapy Education/Self Care 73116   Details:    Given Home Exercise Program   Progress: Reinforced   Education provided to:  Patient   Level of understanding Verbalized   Timed Minutes          Total Timed Treatment:     36   mins  Total Time of Visit:             36   mins         ASSESSMENT/PLAN     GOALS  Goals                                        Progress Note due by 10/30/21                                                              Recert 21     STG by: 3 weeks Comments Status Date   Pt will demonstrate 50 degrees or greater (B) cervical rotaiton   Met   3/23/21   LTG by: 8 weeks         Pt will demonstrate 65 degrees or greater (B) cervical rotaiton   Met 8/10/21   Pt will score 10 or less on the NDI   Met 8/10/21   Pt will be indpendent with HEP to include postural  strengthening and vestibular exercises Reinforced today  Ongoing 9/30/21   Pt will report no dizziness with bending over for one week Dizziness was increased with lights out activity and also lead to nauseas  Ongoing 10/12/21   Pt will demonstrate SLS for 30 seconds or greater  L LE 11 seconds, R LE 13 seconds today  Ongoing 9/30/21   Pt to score <36 on the Dizziness Handicap Index 70 today  Dizziness increased with activities looking down  Ongoing          Assessment/Plan     ASSESSMENT: He probably hasn't been taking the Meclizine long enough to assess it's effectiveness at this time. He was quite dizzy today and exhibited significant scissoring gait pattern consistently and frequently with the noxious visual stimuli today. He required additional time to recover from the dizziness caused by changing from one light source to another today. We did vary today's activities as he did not have to track the lights traveling different directions while simultaneously walking; therefore, he did not lose his balance as often as the last few sessions but did seem to have more difficulty with dizziness today.    PLAN: Review all goals for progress note and continue challenging his VOR, overall static and dynamic balance as well.    Signature: Eusebio Gonzalez, PTA          [address] [place] [address]  [phone]

## 2021-10-28 ENCOUNTER — TELEPHONE (OUTPATIENT)
Dept: NEUROSURGERY | Age: 36
End: 2021-10-28

## 2021-10-28 ENCOUNTER — TREATMENT (OUTPATIENT)
Dept: PHYSICAL THERAPY | Facility: CLINIC | Age: 36
End: 2021-10-28

## 2021-10-28 DIAGNOSIS — S06.0X1A CONCUSSION WITH LOSS OF CONSCIOUSNESS OF 30 MINUTES OR LESS, INITIAL ENCOUNTER: Primary | ICD-10-CM

## 2021-10-28 DIAGNOSIS — S09.90XA HEAD TRAUMA, INITIAL ENCOUNTER: ICD-10-CM

## 2021-10-28 DIAGNOSIS — M54.2 CERVICALGIA: ICD-10-CM

## 2021-10-28 PROCEDURE — 97112 NEUROMUSCULAR REEDUCATION: CPT | Performed by: PHYSICAL THERAPIST

## 2021-10-28 NOTE — PROGRESS NOTES
Physical Therapy Treatment Note    Patient: London Calabrese                                                                     Visit Date: 10/28/2021  :     1985    Referring practitioner:    Anthony Orona MD  Date of Initial Visit:          Type: THERAPY  Noted: 2020    Patient seen for 90 sessions    Visit Diagnoses:    ICD-10-CM ICD-9-CM   1. Concussion with loss of consciousness of 30 minutes or less, initial encounter  S06.0X1A 850.11   2. Cervicalgia  M54.2 723.1   3. Head trauma, initial encounter  S09.90XA 959.01     SUBJECTIVE     Subjective He reports he has had some bad news, they have taken him off WC, will not provide his medication. He has consulted an . He denies HA pain reports a mild decrease in dizziness but not enough to say the medication is making a big difference.     PAIN: 0/10         OBJECTIVE     Objective     Neuromuscular Reeducation     34624 Comments   Walking picking out point of focus in the distance while therapist threw blue then later tie dye ball off the wall in line of sight up down midline, midline<> R, then midline <>L      Toss and catch with kaiden dye ball progressed with errant throws then progressed with closing the distance. As his blink rate decreased I progressed the activity with the black and white striped ball and worked with same type throws and closing the distance                Timed Minutes 45     Therapy Education/Self Care 62664   Details:    Given Home Exercise Program   Progress: Reinforced   Education provided to:  Patient   Level of understanding Verbalized   Timed Minutes          Total Timed Treatment:     45   mins  Total Time of Visit:             45   mins         ASSESSMENT/PLAN     GOALS  Goals                                        Progress Note due by 10/30/21                                                              Recert 21     STG by: 3 weeks Comments  Status Date   Pt will demonstrate 50 degrees or greater (B) cervical rotaiton   Met   3/23/21   LTG by: 8 weeks         Pt will demonstrate 65 degrees or greater (B) cervical rotaiton   Met 8/10/21   Pt will score 10 or less on the NDI   Met 8/10/21   Pt will be indpendent with HEP to include postural strengthening and vestibular exercises Reinforced today  Ongoing 9/30/21   Pt will report no dizziness with bending over for one week Dizziness was increased with lights out activity and also lead to nauseas  Ongoing 10/12/21   Pt will demonstrate SLS for 30 seconds or greater  L LE 11 seconds, R LE 13 seconds today  Ongoing 9/30/21   Pt to score <36 on the Dizziness Handicap Index Reported mild and minimal improvement in dizziness today  Ongoing  10/28/21         Assessment/Plan     ASSESSMENT: He consistently exhibits steady improvement and his R hand eye coordination is improved handling errant throws. However, he continues with hand eye coordination issues on the L and exhibts apprehension walking and performing visual tracking to the L. As far as his report that one physician states he can return to work he may be able to perform some work duties but it would be very limited as he still has significant deficits.    PLAN: Utilize the Neuro[employer] for Limits of Stability testing and the BITS for computerized analysis of his hand and eye coordination.    SIGNATURE: Eusebio Gonzalez PTA, License #: [id number]  Electronically Signed on 10/28/2021        [address] [place] [address]. [address]  [phone]

## 2021-10-28 NOTE — TELEPHONE ENCOUNTER
I guess we will just have to see if his new insurance covers the Trokendi? Or will he be able to use a copay card?

## 2021-10-28 NOTE — TELEPHONE ENCOUNTER
Pt called stating his  and him have spoken today. Workers comp is cutting him off due to being seen by a workers comp provider who has said pt was cleared to go back to work. Pt now has to get states insurance but needs clarification on what to do about trokendi in the mean time.

## 2021-11-02 ENCOUNTER — TREATMENT (OUTPATIENT)
Dept: PHYSICAL THERAPY | Facility: CLINIC | Age: 36
End: 2021-11-02

## 2021-11-02 DIAGNOSIS — S06.0X1A CONCUSSION WITH LOSS OF CONSCIOUSNESS OF 30 MINUTES OR LESS, INITIAL ENCOUNTER: Primary | ICD-10-CM

## 2021-11-02 DIAGNOSIS — M54.2 CERVICALGIA: ICD-10-CM

## 2021-11-02 DIAGNOSIS — S09.90XA HEAD TRAUMA, INITIAL ENCOUNTER: ICD-10-CM

## 2021-11-02 PROCEDURE — 97112 NEUROMUSCULAR REEDUCATION: CPT | Performed by: PHYSICAL THERAPIST

## 2021-11-02 NOTE — PROGRESS NOTES
Physical Therapy 30 Day Progress Note    Patient: London Calabrese                                                                     Visit Date: 2021  :     1985    Referring practitioner:    Anthony Orona MD  Date of Initial Visit:          Type: THERAPY  Noted: 2020    Patient seen for 91 sessions    Visit Diagnoses:    ICD-10-CM ICD-9-CM   1. Concussion with loss of consciousness of 30 minutes or less, initial encounter  S06.0X1A 850.11   2. Cervicalgia  M54.2 723.1   3. Head trauma, initial encounter  S09.90XA 959.01     SUBJECTIVE     Subjective Pt reports that he has an upcoming appt with his Neurologist where they will discuss his POC. His dizziness is lower this date. He still has trouble with dizziness while bending forward. His new medication seems to be helping with night time riding in the car.    PAIN: 0/10         OBJECTIVE     Objective     Neuromuscular Reeducation     70441 Comments   BITS working on reaction avila with various stimuli of dots moving in rotation and different sequencing, some performed with lights on some with lights off Reaction time varied based on task  Slower reaction time noted with a dark backround and white dots those times were about 2.5 sec compared to around 1 to 1.5 sec with standard testing with neutral dot and background.    With lights off demonstrated increased sway and instability along with decreased reaction time.   NueroCOM limits of stability and rhythmic weight shifting  Compared to first 2 sessions greater excursion. Control was better than first session on NueroCom but was less than previous session likely due to his fatigue and exacerbation of symptoms after working with the BITS machine.       Timed Minutes 45     Therapy Education/Self Care 25093   Details:    Given Home Exercise Program   Progress: Reinforced   Education provided to:  Patient   Level of understanding  Verbalized   Timed Minutes          Total Timed Treatment:     45   mins  Total Time of Visit:             45   mins         ASSESSMENT/PLAN     GOALS  Goals                                             Progress Note due by 12/2/21                                               Recert 12/30/21     STG by: 3 weeks Comments Status Date   Pt will demonstrate 50 degrees or greater (B) cervical rotaiton   Met   3/23/21   LTG by: 8 weeks         Pt will demonstrate 65 degrees or greater (B) cervical rotaiton   Met 8/10/21   Pt will score 10 or less on the NDI   Met 8/10/21   Pt will be indpendent with HEP to include postural strengthening and vestibular exercises Reinforced today  Ongoing 9/30/21   Pt will report no dizziness with bending over for one week He continues to have dizziness when bending forward Ongoing 11/2/21   Pt will demonstrate SLS for 30 seconds or greater  L LE 13 seconds, R LE 12 seconds today  Ongoing 11/2/21   Pt to score <36 on the Dizziness Handicap Index 58 this date  Ongoing  11/2/21         Assessment & Plan     Assessment  Impairments: abnormal coordination, abnormal gait, activity intolerance and impaired balance  Prognosis: good  Functional Limitations: sleeping, walking, standing and stooping  Plan  Planned therapy interventions: manual therapy, balance/weight-bearing training, body mechanics training, neuromuscular re-education, motor coordination training, postural training, fine motor coordination training, soft tissue mobilization, spinal/joint mobilization, strengthening, functional ROM exercises, flexibility, gait training, stretching, therapeutic activities, home exercise program, IADL retraining and joint mobilization  Duration in weeks: 12  Treatment plan discussed with: patient         ASSESSMENT: Maury continues to demonstrate slow but steady improvement with therapy. His dizziness and symptom provocation has decreased of late during night time ridging in a vehicle as we have really  been working with light as a visual stimulus during therapy. His also recent change in medication seems to be helping in correlation with therapy. Today we worked on the BITS machine to assess reaction time, coordination and balance. He continues to demonstrate improvement here as well. He had the most difficulty with  A light background with bright white dots moving rotating clockwise and counter clockwise led to decreased stability and more sway while also increasing dizziness and decreasing his processing and reaction time. During use of the NueroCom he demonstrates greater movement excursion than his first 2 sessions. His movement control was a little worse today compared to last session but that was likely due to not performing there NueroCom first and his symptoms being provoked from the BITS machine. Additional skilled therapy is indicated to work on various visual stimulus balance and cognitive tasks to increase his functional mobility.    PLAN: Continue to work on various visual stimulus that challenge his balance and coordination. Work on the BITS machine with varying backgrounds and color stimulants. Work in the dark with light stimulus as this continues to improve. Place additional emphasis on positional changes specifically activity bending down or forward to increase accomodation.    SIGNATURE: Juanjose Turcios PT DPT, License #:[id number]  Electronically Signed on 11/2/2021        [address] [place] [address]. [address]  [phone]

## 2021-11-04 ENCOUNTER — TREATMENT (OUTPATIENT)
Dept: PHYSICAL THERAPY | Facility: CLINIC | Age: 36
End: 2021-11-04

## 2021-11-04 DIAGNOSIS — S09.90XA HEAD TRAUMA, INITIAL ENCOUNTER: ICD-10-CM

## 2021-11-04 DIAGNOSIS — S06.0X1A CONCUSSION WITH LOSS OF CONSCIOUSNESS OF 30 MINUTES OR LESS, INITIAL ENCOUNTER: Primary | ICD-10-CM

## 2021-11-04 DIAGNOSIS — M54.2 CERVICALGIA: ICD-10-CM

## 2021-11-04 PROCEDURE — 97110 THERAPEUTIC EXERCISES: CPT | Performed by: PHYSICAL THERAPIST

## 2021-11-04 PROCEDURE — 97112 NEUROMUSCULAR REEDUCATION: CPT | Performed by: PHYSICAL THERAPIST

## 2021-11-04 NOTE — PROGRESS NOTES
Physical Therapy Treatment Note    Patient: London Calabrese                                                                     Visit Date: 2021  :     1985    Referring practitioner:    Anthony Orona MD  Date of Initial Visit:          Type: THERAPY  Noted: 2020    Patient seen for 92 sessions    Visit Diagnoses:    ICD-10-CM ICD-9-CM   1. Concussion with loss of consciousness of 30 minutes or less, initial encounter  S06.0X1A 850.11   2. Cervicalgia  M54.2 723.1   3. Head trauma, initial encounter  S09.90XA 959.01     SUBJECTIVE     Subjective He reports the Meclizine is working but makes him feel really sleepy    PAIN: 0/10         OBJECTIVE     Objective      Therapeutic Exercises    00386 Comments   Unicam seat# 5 res 3 x 8 min                    Timed Minutes 8     Neuromuscular Reeducation     64393 Comments   Walking toss and catch with blue plain(no pattern ball) then walking toss up catches same ball x 100 ft progressed with same with rainbow pattern ball    Walking RH then LH dribble 55 cm SB then crossover dribbles with 55 cm SB x 200 ft    Walking kicking soccer ball then same with Tactile med ball x 100 ft            Timed Minutes 52       Therapy Education/Self Care 98507   Details:    Given fall prevention  mobility training   Progress: New   Education provided to:  Patient   Level of understanding Verbalized   Timed Minutes          Total Timed Treatment:     60   mins  Total Time of Visit:             60   mins         ASSESSMENT/PLAN     GOALS  Goals                                             Progress Note due by 21                                               Recert 21     STG by: 3 weeks Comments Status Date   Pt will demonstrate 50 degrees or greater (B) cervical rotaiton   Met   3/23/21   LTG by: 8 weeks         Pt will demonstrate 65 degrees or greater (B) cervical rotaiton   Met 8/10/21   Pt will  score 10 or less on the NDI   Met 8/10/21   Pt will be indpendent with HEP to include postural strengthening and vestibular exercises Reinforced today  Ongoing 9/30/21   Pt will report no dizziness with bending over for one week He continues to have dizziness when bending forward Ongoing 11/2/21   Pt will demonstrate SLS for 30 seconds or greater  L LE 13 seconds, R LE 12 seconds today  Ongoing 11/2/21   Pt to score <36 on the Dizziness Handicap Index 58 this date  Ongoing  11/2/21         Assessment/Plan     ASSESSMENT: He did much better today and initially did arrive without dizziness but the stimuli provided with activities today elicited dizziness.    PLAN: Consider utilizing the glow golf balls and other methods of noxious visual stimuli.    SIGNATURE: Eusebio Gonzalez PTA, License #: [id number]  Electronically Signed on 11/4/2021        [address] [place] [address]. [address]  [phone]

## 2021-11-09 ENCOUNTER — TREATMENT (OUTPATIENT)
Dept: PHYSICAL THERAPY | Facility: CLINIC | Age: 36
End: 2021-11-09

## 2021-11-09 DIAGNOSIS — M54.2 CERVICALGIA: ICD-10-CM

## 2021-11-09 DIAGNOSIS — S09.90XA HEAD TRAUMA, INITIAL ENCOUNTER: ICD-10-CM

## 2021-11-09 DIAGNOSIS — S06.0X1A CONCUSSION WITH LOSS OF CONSCIOUSNESS OF 30 MINUTES OR LESS, INITIAL ENCOUNTER: Primary | ICD-10-CM

## 2021-11-09 PROCEDURE — 97112 NEUROMUSCULAR REEDUCATION: CPT | Performed by: PHYSICAL THERAPIST

## 2021-11-09 NOTE — PROGRESS NOTES
"                                                                Physical Therapy Treatment Note    Patient: London Calabrese                                                                     Visit Date: 2021  :     1985    Referring practitioner:    Anthony Orona MD  Date of Initial Visit:          Type: THERAPY  Noted: 2020    Patient seen for 93 sessions    Visit Diagnoses:    ICD-10-CM ICD-9-CM   1. Concussion with loss of consciousness of 30 minutes or less, initial encounter  S06.0X1A 850.11   2. Cervicalgia  M54.2 723.1   3. Head trauma, initial encounter  S09.90XA 959.01     SUBJECTIVE     Subjective He reports that since the last session he has worked with his psychiatrist on coping mechanisms especially dealing with anxiety on a mock/simulated job site. Still feeling \"dopy\" from the Meclizine.    PAIN: 0/10         OBJECTIVE     Objective      Neuromuscular Reeducation     57990 Comments   Glow golf in the hallway tracking the ball then tracking them stopping them hockey style    Walking performing visual tracking with glow ball    Bending over raquel'ing up and putting glow golf balls            Timed Minutes 45       Therapy Education/Self Care 91779   Details:    Given symptom relief   Progress: New and Reinforced   Education provided to:  Patient   Level of understanding Verbalized   Timed Minutes          Total Timed Treatment:     45   mins  Total Time of Visit:             45   mins         ASSESSMENT/PLAN     GOALS  Goals                                             Progress Note due by 21                                               Recert 21     STG by: 3 weeks Comments Status Date   Pt will demonstrate 50 degrees or greater (B) cervical rotaiton   Met   3/23/21   LTG by: 8 weeks         Pt will demonstrate 65 degrees or greater (B) cervical rotaiton   Met 8/10/21   Pt will score 10 or less on the NDI   Met 8/10/21   Pt will be indpendent with HEP to include postural " strengthening and vestibular exercises Reinforced today  Ongoing 9/30/21   Pt will report no dizziness with bending over for one week He continues to have dizziness when bending forward Ongoing 11/2/21   Pt will demonstrate SLS for 30 seconds or greater  L LE 13 seconds, R LE 12 seconds today  Ongoing 11/2/21   Pt to score <36 on the Dizziness Handicap Index 58 this date  Ongoing  11/2/21         Assessment/Plan     ASSESSMENT: Today's interventions elicited increased dizziness and today was the first session we utilized the glowing golf balls.     PLAN: Follow upon his new insurance information and continue utilizing noxious visual stimuli.    SIGNATURE: Eusebio Gonzalez PTA, License #: [id number]  Electronically Signed on 11/9/2021        [address] [place] [address]. [address]  [phone]

## 2021-11-11 ENCOUNTER — TREATMENT (OUTPATIENT)
Dept: PHYSICAL THERAPY | Facility: CLINIC | Age: 36
End: 2021-11-11

## 2021-11-11 DIAGNOSIS — M54.2 CERVICALGIA: ICD-10-CM

## 2021-11-11 DIAGNOSIS — S06.0X1A CONCUSSION WITH LOSS OF CONSCIOUSNESS OF 30 MINUTES OR LESS, INITIAL ENCOUNTER: Primary | ICD-10-CM

## 2021-11-11 DIAGNOSIS — S09.90XA HEAD TRAUMA, INITIAL ENCOUNTER: ICD-10-CM

## 2021-11-11 PROCEDURE — 97110 THERAPEUTIC EXERCISES: CPT | Performed by: PHYSICAL THERAPIST

## 2021-11-11 NOTE — PROGRESS NOTES
Physical Therapy Treatment Note    Patient: London Calabrese                                                                     Visit Date: 2021  :     1985    Referring practitioner:    Anthony Orona MD  Date of Initial Visit:          Type: THERAPY  Noted: 2020    Patient seen for 94 sessions    Visit Diagnoses:    ICD-10-CM ICD-9-CM   1. Concussion with loss of consciousness of 30 minutes or less, initial encounter  S06.0X1A 850.11   2. Cervicalgia  M54.2 723.1   3. Head trauma, initial encounter  S09.90XA 959.01     SUBJECTIVE     Subjective He reports a little more dizziness today due to having to drive in the rain what with the headlights and windshield wipers    PAIN: 0/10         OBJECTIVE     Objective      Therapeutic Exercises    38762 Comments   Obtained an updated [facility]    Unicam Seat #5 res 3 x 10 min    Resisted walking with TRX Rip Trainer x 40 ft x 6    Resisted B side steps with TRX Rip Trainer x 40 ft x 3    Resisted backwards walking with TRX Rip Trainer x 40 ft x 2                Timed Minutes 40       Therapy Education/Self Care 92961   Details:    Given symptom relief   Progress: Reinforced   Education provided to:  Patient   Level of understanding Verbalized   Timed Minutes          Total Timed Treatment:     40   mins  Total Time of Visit:             40   mins         ASSESSMENT/PLAN     GOALS  Goals                                             Progress Note due by 21                                               Recert 21     STG by: 3 weeks Comments Status Date   Pt will demonstrate 50 degrees or greater (B) cervical rotaiton   Met   3/23/21   LTG by: 8 weeks         Pt will demonstrate 65 degrees or greater (B) cervical rotaiton   Met 8/10/21   Pt will score 10 or less on the NDI   Met 8/10/21   Pt will be indpendent with HEP to include postural strengthening and vestibular exercises Reinforced  today  Ongoing 9/30/21   Pt will report no dizziness with bending over for one week He continues to have dizziness when bending forward Ongoing 11/2/21   Pt will demonstrate SLS for 30 seconds or greater  L LE 13 seconds, R LE 12 seconds today  Ongoing 11/2/21   Pt to score <36 on the Dizziness Handicap Index 66 this date  Ongoing  11/11/21         Assessment/Plan     ASSESSMENT: He had a slight change in his DHI score but given his history not a significant change. Due to the weather conditions today I refrained from using the usual noxious visual stimuli and focused on strengthening activities that would challenge his balance as well.    PLAN: Continue to progress as symptoms allow.    SIGNATURE: Eusebio Gonzalez PTA, License #:   Electronically Signed on 11/11/2021        [address] [place] [address]. [address]  [phone]

## 2021-11-16 ENCOUNTER — TREATMENT (OUTPATIENT)
Dept: PHYSICAL THERAPY | Facility: CLINIC | Age: 36
End: 2021-11-16

## 2021-11-16 ENCOUNTER — TELEPHONE (OUTPATIENT)
Dept: NEUROSURGERY | Age: 36
End: 2021-11-16

## 2021-11-16 DIAGNOSIS — S06.0X9D CONCUSSION WITH LOSS OF CONSCIOUSNESS, SUBSEQUENT ENCOUNTER: Primary | ICD-10-CM

## 2021-11-16 DIAGNOSIS — S06.0X1A CONCUSSION WITH LOSS OF CONSCIOUSNESS OF 30 MINUTES OR LESS, INITIAL ENCOUNTER: Primary | ICD-10-CM

## 2021-11-16 DIAGNOSIS — M54.2 CERVICALGIA: ICD-10-CM

## 2021-11-16 DIAGNOSIS — S09.90XA HEAD TRAUMA, INITIAL ENCOUNTER: ICD-10-CM

## 2021-11-16 PROCEDURE — 97112 NEUROMUSCULAR REEDUCATION: CPT | Performed by: PHYSICAL THERAPIST

## 2021-11-16 PROCEDURE — 97110 THERAPEUTIC EXERCISES: CPT | Performed by: PHYSICAL THERAPIST

## 2021-11-16 NOTE — TELEPHONE ENCOUNTER
Felicia Guzman with [facility] called in requesting an order be faxed to [phone] for a request for services for rehab.    Reasoning: Concussion with loss of cnsciousness  ICD CODE: S06.0X1A

## 2021-11-16 NOTE — PROGRESS NOTES
Physical Therapy Treatment Note    Patient: London Calabrese                                                                     Visit Date: 2021  :     1985    Referring practitioner:    Anthony Orona MD  Date of Initial Visit:          Type: THERAPY  Noted: 2020    Patient seen for 95 sessions    Visit Diagnoses:    ICD-10-CM ICD-9-CM   1. Concussion with loss of consciousness of 30 minutes or less, initial encounter  S06.0X1A 850.11   2. Cervicalgia  M54.2 723.1   3. Head trauma, initial encounter  S09.90XA 959.01     SUBJECTIVE     Subjective He reports he has been feeling great of late, the dizziness is much improved. He reports he has some dizziness after the drive in due to the headlights because it's cloudy    PAIN: 0/10         OBJECTIVE     Objective      Neuromuscular Reeducation     70415 Comments   Glow golf in the dimly lit hallway tracking the ball then tracking them stopping them hockey style    Quad-ped fielding in dimly lit treatment room glow golf balls then progressed to moving from tall kneeling to quad-ped fielding same                Timed Minutes 20     Therapeutic Exercises    09342 Comments   Unicam seat #5 res 3 x 10 min                    Timed Minutes 10     Therapy Education/Self Care 13769   Details:    Given symptom relief   Progress: New   Education provided to:  Patient   Level of understanding Verbalized   Timed Minutes          Total Timed Treatment:     30  mins  Total Time of Visit:             30   mins         ASSESSMENT/PLAN     GOALS  Goals                                             Progress Note due by 21                                               Recert 21     STG by: 3 weeks Comments Status Date   Pt will demonstrate 50 degrees or greater (B) cervical rotaiton   Met   3/23/21   LTG by: 8 weeks         Pt will demonstrate 65 degrees or greater (B) cervical rotaiton   Met  8/10/21   Pt will score 10 or less on the NDI   Met 8/10/21   Pt will be indpendent with HEP to include postural strengthening and vestibular exercises Reinforced today  Ongoing 9/30/21   Pt will report no dizziness with bending over for one week He continues to have dizziness when bending forward Ongoing 11/2/21   Pt will demonstrate SLS for 30 seconds or greater  L LE 13 seconds, R LE 12 seconds today  Ongoing 11/2/21   Pt to score <36 on the Dizziness Handicap Index 66 this date  Ongoing  11/11/21         Assessment/Plan     ASSESSMENT: He still has difficulty moving and tracking items to his L as well as decreased hand and eye coordination with his L UE in quad-ped this date. He continues to have dizziness with noxious visual stimuli.    PLAN: Utilize the BITS for visual tracking as well as have him perform various tasks competition mode while on [place] board.    SIGNATURE: Eusebio Gonzalez PTA, License #: [id number]  Electronically Signed on 11/16/2021        [address] [place] [address]. [address]  [phone]

## 2021-11-18 ENCOUNTER — TREATMENT (OUTPATIENT)
Dept: PHYSICAL THERAPY | Facility: CLINIC | Age: 36
End: 2021-11-18

## 2021-11-18 DIAGNOSIS — M54.2 CERVICALGIA: ICD-10-CM

## 2021-11-18 DIAGNOSIS — S09.90XA HEAD TRAUMA, INITIAL ENCOUNTER: ICD-10-CM

## 2021-11-18 DIAGNOSIS — S06.0X1A CONCUSSION WITH LOSS OF CONSCIOUSNESS OF 30 MINUTES OR LESS, INITIAL ENCOUNTER: Primary | ICD-10-CM

## 2021-11-18 PROCEDURE — 97112 NEUROMUSCULAR REEDUCATION: CPT | Performed by: PHYSICAL THERAPIST

## 2021-11-18 NOTE — PROGRESS NOTES
Physical Therapy Treatment Note    Patient: London Calabrese                                                                     Visit Date: 2021  :     1985    Referring practitioner:    Anthony Orona MD  Date of Initial Visit:          Type: THERAPY  Noted: 2020    Patient seen for 96 sessions    Visit Diagnoses:    ICD-10-CM ICD-9-CM   1. Concussion with loss of consciousness of 30 minutes or less, initial encounter  S06.0X1A 850.11   2. Cervicalgia  M54.2 723.1   3. Head trauma, initial encounter  S09.90XA 959.01     SUBJECTIVE     Subjective He denies HA pain or dizziness prior to treatment    PAIN: 0/10         OBJECTIVE     Objective      Neuromuscular Reeducation     77185 Comments   Competition words, numbers, letters, numbers and letters standing on theradisks @ BITS    Visual tracking black and white screen, sequencing numbers, letters with central fixation standing on theradisks @ BITS     Tandem walk x 10 ft    Seated watching scrolling dog (noxiuos visual stimuli)    Walking forward backward toward [employer] fire simulator    Timed Minutes 45       Therapy Education/Self Care 94916   Details:    Given symptom relief   Progress: Reinforced   Education provided to:  Patient   Level of understanding Verbalized   Timed Minutes          Total Timed Treatment:     45   mins  Total Time of Visit:             45   mins         ASSESSMENT/PLAN     GOALS    Goals                                             Progress Note due by 21                                               Recert 21     STG by: 3 weeks Comments Status Date   Pt will demonstrate 50 degrees or greater (B) cervical rotaiton   Met   3/23/21   LTG by: 8 weeks         Pt will demonstrate 65 degrees or greater (B) cervical rotaiton   Met 8/10/21   Pt will score 10 or less on the NDI   Met 8/10/21   Pt will be indpendent with HEP to include postural  strengthening and vestibular exercises Reinforced today  Ongoing 9/30/21   Pt will report no dizziness with bending over for one week He continues to have dizziness when bending forward Ongoing 11/2/21   Pt will demonstrate SLS for 30 seconds or greater  L LE 13 seconds, R LE 12 seconds today  Ongoing 11/2/21   Pt to score <36 on the Dizziness Handicap Index 66 this date  Ongoing  11/11         Assessment/Plan     ASSESSMENT: Initially we started the activities at the [facility] with him standing on the wobble board and while he was able to maintain his balance it was a little to challenging overall for him today to perform all those tasks so I quickly modified it to standing on Theradisks.    PLAN: Continue to work on improving his overall balance and coordination as well as utilize noxious stimuli.    SIGNATURE: Eusebio Gonzalez PTA, License #: [id number]  Electronically Signed on 11/18/2021        [address] [place] [address]. [address]  [phone]

## 2021-11-22 ENCOUNTER — TREATMENT (OUTPATIENT)
Dept: PHYSICAL THERAPY | Facility: CLINIC | Age: 36
End: 2021-11-22

## 2021-11-22 DIAGNOSIS — M54.2 CERVICALGIA: ICD-10-CM

## 2021-11-22 DIAGNOSIS — S09.90XA HEAD TRAUMA, INITIAL ENCOUNTER: ICD-10-CM

## 2021-11-22 DIAGNOSIS — S06.0X1A CONCUSSION WITH LOSS OF CONSCIOUSNESS OF 30 MINUTES OR LESS, INITIAL ENCOUNTER: Primary | ICD-10-CM

## 2021-11-22 PROCEDURE — 97112 NEUROMUSCULAR REEDUCATION: CPT | Performed by: PHYSICAL THERAPIST

## 2021-11-22 NOTE — PROGRESS NOTES
Physical Therapy Treatment Note    Patient: London Calabrese                                                                     Visit Date: 2021  :     1985    Referring practitioner:    Anthony Orona MD  Date of Initial Visit:          Type: THERAPY  Noted: 2020    Patient seen for 97 sessions    Visit Diagnoses:    ICD-10-CM ICD-9-CM   1. Concussion with loss of consciousness of 30 minutes or less, initial encounter  S06.0X1A 850.11   2. Cervicalgia  M54.2 723.1   3. Head trauma, initial encounter  S09.90XA 959.01     SUBJECTIVE     Subjective He reports the dizziness is getting better and so is the grogginess. He reports sleeping better as well and the medicine seems to be working better.    PAIN: 0/10         OBJECTIVE     Objective      Neuromuscular Reeducation     84305 Comments   Quad ped kneeling fielding balls of various sizes and patterns including geometric prints from treadmill 2.0 mph progressed with closing distance then increasing speed to 4.0 mph then adding various spins    Stepping with toss ups with beach ball x 40 ft    Stepping toss and catch with beach ball x 40 ft    Walking with alt kicks with beach ball x 40 ft        Timed Minutes 42       Therapy Education/Self Care 13530   Details:    Given postural retraining  symptom relief   Progress: Reinforced   Education provided to:  Patient   Level of understanding Verbalized   Timed Minutes          Total Timed Treatment:     42   mins  Total Time of Visit:             42   mins         ASSESSMENT/PLAN     GOALS  Goals                                             Progress Note due by 21                                               Recert 21     STG by: 3 weeks Comments Status Date   Pt will demonstrate 50 degrees or greater (B) cervical rotaiton   Met   3/23/21   LTG by: 8 weeks         Pt will demonstrate 65 degrees or greater (B) cervical rotaiton    Met 8/10/21   Pt will score 10 or less on the NDI   Met 8/10/21   Pt will be indpendent with HEP to include postural strengthening and vestibular exercises Reinforced today  Ongoing 9/30/21   Pt will report no dizziness with bending over for one week He continues to have dizziness when bending forward Ongoing 11/2/21   Pt will demonstrate SLS for 30 seconds or greater  L LE 13 seconds, R LE 12 seconds today  Ongoing 11/2/21   Pt to score <36 on the Dizziness Handicap Index Reports improvement since last week still having dizziness during sessions  Ongoing  11/22/21         Assessment/Plan     ASSESSMENT: Today is the first time we utilized the treadmill as both a motion background and a way to add to the noxious stimuli and visual tracking challenges as he had to watch each ball as it rolled on the treadmill until it rolled off towards him. Adding different spin directions as well as increasing speed and closing tracking distance increased his blink rate and caused him to suffer some dizziness.    PLAN: Assess his long term response to today's session and progress accordingly.    SIGNATURE: Eusebio Gonzalez PTA, License #: [id number]  Electronically Signed on 11/22/2021        [address] [place] [address]. [address]  [phone]

## 2021-11-24 ENCOUNTER — TREATMENT (OUTPATIENT)
Dept: PHYSICAL THERAPY | Facility: CLINIC | Age: 36
End: 2021-11-24

## 2021-11-24 DIAGNOSIS — S06.0X1A CONCUSSION WITH LOSS OF CONSCIOUSNESS OF 30 MINUTES OR LESS, INITIAL ENCOUNTER: Primary | ICD-10-CM

## 2021-11-24 DIAGNOSIS — S09.90XA HEAD TRAUMA, INITIAL ENCOUNTER: ICD-10-CM

## 2021-11-24 DIAGNOSIS — M54.2 CERVICALGIA: ICD-10-CM

## 2021-11-24 PROCEDURE — 97112 NEUROMUSCULAR REEDUCATION: CPT | Performed by: PHYSICAL THERAPIST

## 2021-11-24 NOTE — PROGRESS NOTES
Physical Therapy Treatment Note    Patient: London Calabrese                                                                     Visit Date: 2021  :     1985    Referring practitioner:    Anthony Orona MD  Date of Initial Visit:          Type: THERAPY  Noted: 2020    Patient seen for 98 sessions    Visit Diagnoses:    ICD-10-CM ICD-9-CM   1. Concussion with loss of consciousness of 30 minutes or less, initial encounter  S06.0X1A 850.11   2. Cervicalgia  M54.2 723.1   3. Head trauma, initial encounter  S09.90XA 959.01     SUBJECTIVE     Subjective He reports mild dizziness no HA pain and dizziness is improved. Didn't sleep well last night    PAIN: 0/10         OBJECTIVE     Objective     Neuromuscular Reeducation     13045 Comments   Quad ped bird dogs x 10 attempted same while visual tracking scrolling dogs but unable to maintain control    Visual tracking skee ball style @ treadmill  .8 mph with orange ball then progressed to same with 5% incline 1 mph with soccer ball     Assembly line visual tracking with 8% incline and 2 mph then progressed to same with 15 % incline  3 mph             Timed Minutes 40     Therapy Education/Self Care 77599   Details:    Given symptom relief   Progress: Reinforced   Education provided to:  Patient   Level of understanding Verbalized   Timed Minutes          Total Timed Treatment:     40   mins  Total Time of Visit:             40   mins         ASSESSMENT/PLAN     GOALS  Goals                                             Progress Note due by 21                                               Recert 21     STG by: 3 weeks Comments Status Date   Pt will demonstrate 50 degrees or greater (B) cervical rotaiton   Met   3/23/21   LTG by: 8 weeks         Pt will demonstrate 65 degrees or greater (B) cervical rotaiton   Met 8/10/21   Pt will score 10 or less on the NDI   Met 8/10/21   Pt will be  indpendent with HEP to include postural strengthening and vestibular exercises Reinforced today  Ongoing 11/24/21   Pt will report no dizziness with bending over for one week He continues to have dizziness when bending forward Ongoing 11/2/21   Pt will demonstrate SLS for 30 seconds or greater  L LE 13 seconds, R LE 12 seconds today  Ongoing 11/2/21   Pt to score <36 on the Dizziness Handicap Index Reports improvement since last week still having dizziness during sessions  Ongoing  11/22/21         Assessment/Plan     ASSESSMENT: Today his blink rate increased intermittently but each time he recovered quickly. In addition, he exhibited some sway from midline with the treadmill activities today but only required SBA for maintaining balance safely. I advised him that we haven't heard anything in regard to insurance authorization at this time but next session we would perform an updated progress note.    PLAN: Perform an updated progress note by addressing all goals.    SIGNATURE: Eusebio Gonzalez PTA, License #: [id number]  Electronically Signed on 11/24/2021        [address] [place] [address]. [address]  [phone]

## 2021-11-30 ENCOUNTER — TELEPHONE (OUTPATIENT)
Dept: PHYSICAL THERAPY | Facility: CLINIC | Age: 36
End: 2021-11-30

## 2021-12-02 ENCOUNTER — TREATMENT (OUTPATIENT)
Dept: PHYSICAL THERAPY | Facility: CLINIC | Age: 36
End: 2021-12-02

## 2021-12-02 DIAGNOSIS — M54.2 CERVICALGIA: ICD-10-CM

## 2021-12-02 DIAGNOSIS — S06.0X1A CONCUSSION WITH LOSS OF CONSCIOUSNESS OF 30 MINUTES OR LESS, INITIAL ENCOUNTER: Primary | ICD-10-CM

## 2021-12-02 DIAGNOSIS — S09.90XA HEAD TRAUMA, INITIAL ENCOUNTER: ICD-10-CM

## 2021-12-02 PROCEDURE — 97112 NEUROMUSCULAR REEDUCATION: CPT | Performed by: PHYSICAL THERAPIST

## 2021-12-02 NOTE — PROGRESS NOTES
Progress Note Addendum      Patient: London Calbarese           : 1985  Visit Date: 2021  Referring practitioner: Anthony Orona MD  Date of Initial Visit: Type: THERAPY  Noted: 2020  Patient seen for 99 sessions  Visit Diagnoses:    ICD-10-CM ICD-9-CM   1. Concussion with loss of consciousness of 30 minutes or less, initial encounter  S06.0X1A 850.11   2. Cervicalgia  M54.2 723.1   3. Head trauma, initial encounter  S09.90XA 959.01       PT G-Codes  Outcome Measure Options: Dizziness Handicap Inventory (66)  Clinical Progress: improved  Home Program Compliance: Yes  Progress toward previous goals: Partially Met  Prognosis to achieve goals: good    Objective     Assessment & Plan     Assessment  Impairments: abnormal coordination, abnormal gait, activity intolerance and impaired balance  Functional Limitations: sleeping, walking, standing and stoopingPrognosis: good    Plan  Planned therapy interventions: manual therapy, balance/weight-bearing training, body mechanics training, neuromuscular re-education, motor coordination training, postural training, fine motor coordination training, soft tissue mobilization, spinal/joint mobilization, strengthening, functional ROM exercises, flexibility, gait training, stretching, therapeutic activities, home exercise program, IADL retraining and joint mobilization  Duration in weeks: 12  Treatment plan discussed with: patient          SIGNATURE: Juanjose Turcios PT DPT, License #: [id number]  Electronically Signed on 2021

## 2021-12-02 NOTE — PROGRESS NOTES
Physical Therapy 30 Day Progress Note    Patient: London Calabrese                                                                     Visit Date: 2021  :     1985    Referring practitioner:    Anthony Orona MD  Date of Initial Visit:          Type: THERAPY  Noted: 2020    Patient seen for 99 sessions    Visit Diagnoses:    ICD-10-CM ICD-9-CM   1. Concussion with loss of consciousness of 30 minutes or less, initial encounter  S06.0X1A 850.11   2. Cervicalgia  M54.2 723.1   3. Head trauma, initial encounter  S09.90XA 959.01     SUBJECTIVE     Subjective He reports he will have to get a neurologist due to insurance coverage. Denies HA pain, mild dizziness at rest 1 on a 1-10 scale.   PAIN: 0/10    PT G-Codes  Outcome Measure Options: Dizziness Handicap Inventory (66)    OBJECTIVE     Objective     Neuromuscular Reeducation     25382 Comments   B SLS    Standing visual tracking while staring at striped rope in motion varied speeds and change in direction then same naming how many fingers therapist was holding up at the other end of the rope    Glow golf in the dimly lit hallway tracking the ball then tracking them stopping them hockey style    Walking following flash light visual tracking with light stationary then moving all planes x 100 ft         Timed Minutes 40     Therapy Education/Self Care 75241   Details:    Given Home Exercise Program   Progress: Reinforced   Education provided to:  Patient   Level of understanding Verbalized and Demonstrated   Timed Minutes          Total Timed Treatment:     40   mins  Total Time of Visit:             40   mins         ASSESSMENT/PLAN     GOALS  Goals                                             Progress Note due by 21                                               Recert 21     STG by: 3 weeks Comments Status Date   Pt will demonstrate 50 degrees or greater (B) cervical rotaiton    Met   3/23/21   LTG by: 8 weeks         Pt will demonstrate 65 degrees or greater (B) cervical rotaiton   Met 8/10/21   Pt will score 10 or less on the NDI   Met 8/10/21   Pt will be indpendent with HEP to include postural strengthening and vestibular exercises Reinforced today  Ongoing 12/2/21   Pt will report no dizziness with bending over for one week can  a forward flexed position for 15 seconds until dizziness increases  Ongoing 12/2/21   Pt will demonstrate SLS for 30 seconds or greater  R LE 12 sec L LE 10 sec  Ongoing 12/2/21   Pt to score <36 on the Dizziness Handicap Index 66 today  Ongoing  12/2/21         Assessment/Plan     ASSESSMENT: He continues to improve with therapy and his symptoms are slowly improving as well. He still has quite a bit of difficulty with ADL's most notably being able to drive or even ride in a car at night and on days when the weather requires him to use his windshield wipers due to the visual stimuli causing dizziness. In regards to his most recent Neurocom test results his reaction times have improved an average of .3 seconds and his maximum endpoint excursion have improved also. However, at this time he would be unsafe to work on an elevated height and would likely sustain a significant injury. His dizziness effects other aspects of his life as well such as difficulty playing with his children and it has also effected his reading.     PLAN: Continue to utilize the BITS machine as well as the Neurocom. Also continue to utilize the Cytosorbents print balls to provide noxious stimuli and increase the challenge of his static and dynamic balance activities.     SIGNATURE: Eusebio Gonzalez PTA, License #: [id number]  Electronically Signed on 12/2/2021        [address] [place] [address]. [address]  [phone]

## 2022-03-09 ENCOUNTER — TELEPHONE (OUTPATIENT)
Dept: NEUROLOGY | Facility: CLINIC | Age: 37
End: 2022-03-09

## 2022-03-09 NOTE — TELEPHONE ENCOUNTER
Message left explaining that Dr. Puri is at [facility].  Left number to call if they had any questions.    will see patient in ED

## 2022-03-09 NOTE — TELEPHONE ENCOUNTER
Caller:  RAGHAV WITH [facility] OFFICE   PHONE NUMBER: [phone]  FAX#:[phone]        Reason for Call: MARSHAPANFILO SAID SHE SPOKE WITH TENA THE NURSE AND SHE WAS GOING TO FAX OVER THE INVOICE FOR THE MEDICAL OPINION FORM THAT DR. DEGROOT NEEDS TO FILL OUT. SHE HAS NOT RECEIVED THAT FORM YET.  PLEASE FAX IT TO FAX#:[phone]

## 2022-03-14 ENCOUNTER — TELEPHONE (OUTPATIENT)
Dept: NEUROSURGERY | Age: 37
End: 2022-03-14

## 2022-03-14 NOTE — TELEPHONE ENCOUNTER
Faxed received in office requesting DURAN Gonzalez fill out a Medical Opinion Form for patient for a Workman's Comp Issue. Due to there being a charge for these forms to be filled out, called Nasir Vaughn at [phone] and no answer - left a message to return call. Hermes Conley called back and I stated that there would be an $800 Fee to fill out the forms and that I could send over the invoice.  Hermes Conley states she will need to get approved first but to send invoice to [email or web address]

## 2022-04-29 ENCOUNTER — OFFICE VISIT (OUTPATIENT)
Dept: NEUROSURGERY | Age: 37
End: 2022-04-29
Payer: COMMERCIAL

## 2022-04-29 VITALS
OXYGEN SATURATION: 100 % | SYSTOLIC BLOOD PRESSURE: 134 MMHG | WEIGHT: 182 LBS | HEIGHT: 69 IN | DIASTOLIC BLOOD PRESSURE: 85 MMHG | TEMPERATURE: 97.4 F | BODY MASS INDEX: 26.96 KG/M2 | HEART RATE: 87 BPM

## 2022-04-29 DIAGNOSIS — R42 DIZZINESS: ICD-10-CM

## 2022-04-29 DIAGNOSIS — F07.81 POST CONCUSSION SYNDROME: Primary | ICD-10-CM

## 2022-04-29 DIAGNOSIS — G43.009 MIGRAINE WITHOUT AURA AND WITHOUT STATUS MIGRAINOSUS, NOT INTRACTABLE: ICD-10-CM

## 2022-04-29 PROCEDURE — 99213 OFFICE O/P EST LOW 20 MIN: CPT | Performed by: NURSE PRACTITIONER

## 2022-04-29 RX ORDER — TOPIRAMATE 100 MG/1
100 CAPSULE, EXTENDED RELEASE ORAL DAILY
Qty: 30 CAPSULE | Refills: 3 | Status: SHIPPED | OUTPATIENT
Start: 2022-04-29 | End: 2022-07-29 | Stop reason: SDUPTHER

## 2022-04-29 RX ORDER — SUMATRIPTAN 50 MG/1
TABLET, FILM COATED ORAL
Qty: 9 TABLET | Refills: 3 | Status: SHIPPED | OUTPATIENT
Start: 2022-04-29

## 2022-04-29 RX ORDER — MECLIZINE HYDROCHLORIDE 25 MG/1
25 TABLET ORAL 3 TIMES DAILY PRN
Qty: 90 TABLET | Refills: 3 | Status: SHIPPED | OUTPATIENT
Start: 2022-04-29

## 2022-04-29 NOTE — PROGRESS NOTES
[facility] Neurology Office Note      Patient:   Ani Kearns  MR#:    [id number]  Account Number:                         YOB: 1985  Date of Evaluation:  4/29/2022  Time of Note:                          12:13 PM  Primary/Referring Physician:  No primary care provider on file. Consulting Physician:  Chalo Barragan, ALPHONSO, APRN    FOLLOW UP    Chief Complaint   Patient presents with    Follow-up     c/o dizziness worsening has been out of medication     Migraine    Medication Refill       HISTORY OF PRESENT ILLNESS    Ani Kearns is a 42y.o. year old male here for follow up of headaches and dizziness. Has noted improvement in headaches. No change in characteristics of headaches. Headache described as bilateral, band like sensation over the occipital/posterior areas of his head. He has more recently noted a left sided, retro orbital pain. Pain is described as a lightening bolt through his head. He notes light/sound sensitivity, nausea with headaches. He notes intermittent dizziness with headaches. Might note intermittent \"flashing\" type visual changes but does not always correlate these with headaches, occurs more with something coming into his visual field (like a ball being thrown to him). He will note numbness in his right arm with more severe headaches. No weakness. Watching TV, bending over makes headaches worse. On Trokendi, no side effects. Taking Imitrex prn which is beneficial. Noting 10 headaches or migraines in a month. Still noting intermittent dizziness. Described as room spinning around him. He does get nauseous with dizziness, vomiting noted at times. Seems like lights or looking down will trigger dizziness. Meclizine does help. Completed PT, primarily for neck pain but doing exercises for dizziness as well. Noting nightmares, seeing psychiatry. Symptoms began after a work accident in March 2020. A metal pipe hit him on the top of his head, knocked out 4 teeth, had LOC with event. Has seen [facility] neurosurgery for neck pain. History reviewed. No pertinent past medical history. Past Surgical History:   Procedure Laterality Date    DENTAL SURGERY         History reviewed. No pertinent family history. Social History     Socioeconomic History    Marital status: Single     Spouse name: Not on file    Number of children: Not on file    Years of education: Not on file    Highest education level: Not on file   Occupational History    Not on file   Tobacco Use    Smoking status: Current Every Day Smoker     Packs/day: 0.50     Types: Cigarettes    Smokeless tobacco: Never Used   Vaping Use    Vaping Use: Never used   Substance and Sexual Activity    Alcohol use: Not Currently    Drug use: Not Currently    Sexual activity: Not on file   Other Topics Concern    Not on file   Social History Narrative    Not on file     Social Determinants of Health     Financial Resource Strain:     Difficulty of Paying Living Expenses: Not on file   Food Insecurity:     Worried About Running Out of Food in the Last Year: Not on file    Willis of Food in the Last Year: Not on file   Transportation Needs:     Lack of Transportation (Medical): Not on file    Lack of Transportation (Non-Medical):  Not on file   Physical Activity:     Days of Exercise per Week: Not on file    Minutes of Exercise per Session: Not on file   Stress:     Feeling of Stress : Not on file   Social Connections:     Frequency of Communication with Friends and Family: Not on file    Frequency of Social Gatherings with Friends and Family: Not on file    Attends Hinduism Services: Not on file    Active Member of Clubs or Organizations: Not on file    Attends Club or Organization Meetings: Not on file    Marital Status: Not on file   Intimate Partner Violence:     Fear of Current or Ex-Partner: Not on file    Emotionally Abused: Not on file    Physically Abused: Not on file    Sexually Abused: Not on file Housing Stability:     Unable to Pay for Housing in the Last Year: Not on file    Number of Places Lived in the Last Year: Not on file    Unstable Housing in the Last Year: Not on file       Current Outpatient Medications   Medication Sig Dispense Refill    meclizine (ANTIVERT) 25 MG tablet Take 1 tablet by mouth 3 times daily as needed for Dizziness 90 tablet 3    TROKENDI  MG CP24 Take 100 mg by mouth daily 30 capsule 3    SUMAtriptan (IMITREX) 50 MG tablet Take 1 tablet at the onset of migraine. Can repeat once in 2 hours if no improvement. Do not exceed 2 tablets in 24 hours. 9 tablet 3     No current facility-administered medications for this visit. No Known Allergies    REVIEW OF SYSTEMS  Constitutional: []? Fever []? Sweats []? Chills []? Recent Injury [x]? Denies all unless marked  HEENT:[x]? Headache  []? Head Injury []? Hearing Loss  []? Sore Throat  []? Ear Ache [x]? Denies all unless marked  Spine:  []? Neck pain  []? Back pain  []? Sciaticia  [x]? Denies all unless marked  Cardiovascular:[]? Heart Disease []? Palpitations []? Chest Pain   [x]? Denies all unless marked  Pulmonary: []? Shortness of Breath []? Cough   [x]? Denies all unless marked  Psychiatric/Behavioral:[]? Depression []? Anxiety [x]? Denies all unless marked  Gastrointestinal: []? Nausea  []? Vomiting  []? Abdominal Pain  []? Constipation  []? Diarrhea  [x]? Denies all unless marked  Genitourinary:   []? Frequency  []? Urgency  []? Dysuria []? Incontinence  [x]? Denies all unless marked  Extremities: []? Pain  []? Swelling  [x]? Denies all unless marked  Musculoskeletal: []? Myalgias  []? Joint Pain  []? Arthritis []? Muscle Cramps []? Muscle Twitches  [x]? Denies all unless marked  Sleep: []? Insomnia[]? Snoring []? Restless Legs  []? Sleep Apnea  []? Daytime Sleepiness  [x]? Denies all unless marked  Skin:[]? Rash []? Color Change [x]? Denies all unless marked   Neurological:[]? Visual Disturbance []? Memory Loss []? Loss of Balance []?Slurred Speech []? Weakness []? Seizures  []? Dizziness [x]? Denies all unless marked    The MA has completed the ROS with the patient. I have reviewed it in its' entirety with the patient and agree with the documentation. PHYSICAL EXAM  /85   Pulse 87   Temp 97.4 °F (36.3 °C)   Ht 5' 9\" (1.753 m)   Wt 182 lb (82.6 kg)   SpO2 100%   BMI 26.88 kg/m²       Constitutional - No acute distress    HEENT- Conjunctiva normal.  No scars, masses, or lesions over external nose or ears, no neck masses noted, no jugular vein distension, no bruit  Cardiac- Regular rate and rhythm  Pulmonary- Good expansion, normal effort without use of accessory muscles  Musculoskeletal - No significant wasting of muscles noted, no bony deformities  Extremities - No clubbing, cyanosis or edema  Skin - Warm, dry, and intact. No rash, erythema, or pallor  Psychiatric - Mood, affect, and behavior appear normal      NEUROLOGICAL EXAM     Mental status   [x] Awake, alert, oriented   [x]Affect attention and concentration appear appropriate  [x]Recent and remote memory appears unremarkable  [x]Speech normal without dysarthria or aphasia, comprehension and repetition intact.    COMMENTS:    Cranial Nerves [x]No VF deficit to confrontation,  no papilledema on fundoscopic exam.  [x]PERRLA, EOMI, no nystagmus, conjugate eye movements, no ptosis  [x]Face symmetric  [x]Facial sensation intact  [x]Tongue midline no atrophy or fasciculations present  [x]Palate midline, hearing to finger rub normal bilaterally  [x]Shoulder shrug and SCM testing normal bilaterally  COMMENTS:   Motor   [x]5/5 strength x 4 extremities  [x]Normal bulk and tone  [x]No tremor present  [x]No rigidity or bradykinesia noted  COMMENTS:   Sensory  [x]Sensation intact to light touch, pin prick, vibration, and proprioception BLE  [x]Sensation intact to light touch, pin prick, vibration, and proprioception BUE  COMMENTS:   Coordination [x]FTN normal bilaterally   [x]HTS normal bilaterally  [x]KAI normal bilaterally. COMMENTS:   Reflexes  [x]Symmetric and non-pathological  [x]Toes down going bilaterally  [x]No clonus present  COMMENTS:   Gait                  [x]Normal steady gait    []Ataxic    []Spastic     []Magnetic     []Shuffling  COMMENTS:       LABS RECORD AND IMAGING REVIEW (As below and per HPI)    No results found for: [id number]  No results found for: WBC, HGB, HCT, MCV, PLT  No results found for: NA, K, CL, CO2, BUN, CREATININE, GLUCOSE, CALCIUM, PROT, LABALBU, BILITOT, ALKPHOS, AST, ALT, LABGLOM, GFRAA, AGRATIO, GLOB  No results found for: CHOL, TRIG, HDL, LDLCALC  No results found for: TSH, T4FREE  No results found for: CRP, SEDRATE     MRI brain (6/2020)- normal     MRA head (5/2021)- normal     MRI cervical spine (6/2020)- mild DDD, moderate NF narrowing at C3/4 and C6/7    Reviewed referral records     ASSESSMENT:    Cinda Torres is a 42y.o. year old male here for follow up of headaches and concussion. Exam today is non focal today. Prior MRI/MRA normal. Headaches overall much improved with Trokendi. Still noting dizziness however. will try dedicated course of vestibular rehab today. Headaches and dizziness began after hitting his head on a large metal pipe at work, LOC noted. Suspect post concussive in nature. Given continued dizziness will hold off on returning to work for now, will re-try PT. Will re-evaluate at next visit. ICD-10-CM    1. Post concussion syndrome  F07.81    2. Migraine without aura and without status migrainosus, not intractable  G43.009 TROKENDI  MG CP24   3. Dizziness  R42 External Referral To Physical Therapy       PLAN:  1. Continue Trokendi XR 100mg daily. Discussed side effects including renal stones, paresthesias and cognitive changes. 2. Continue Imitrex prn   3. Continue Meclizine prn   4. Vestibular rehab   5. Off work given persistent dizziness, letter given to patient   6.  Continue follow up with neurosurgery as previously scheduled   7. Return in about 3 months (around 7/29/2022) for follow up, sooner if worsening.     Shannon Boateng DNP, APRN

## 2022-05-23 ENCOUNTER — TELEPHONE (OUTPATIENT)
Dept: NEUROSURGERY | Age: 37
End: 2022-05-23

## 2022-05-23 NOTE — TELEPHONE ENCOUNTER
Called the [address] [place] [address] workers comp medication authorization request. Spoke with Gibran who voiced laurie was authorized at this time and no further questions were needed.

## 2022-07-29 ENCOUNTER — HOSPITAL ENCOUNTER (OUTPATIENT)
Dept: GENERAL RADIOLOGY | Age: 37
Discharge: HOME | End: 2022-07-29
Payer: COMMERCIAL

## 2022-07-29 ENCOUNTER — OFFICE VISIT (OUTPATIENT)
Dept: NEUROSURGERY | Age: 37
End: 2022-07-29
Payer: COMMERCIAL

## 2022-07-29 ENCOUNTER — APPOINTMENT (OUTPATIENT)
Dept: GENERAL RADIOLOGY | Facility: HOSPITAL | Age: 37
End: 2022-07-29

## 2022-07-29 ENCOUNTER — TELEPHONE (OUTPATIENT)
Dept: NEUROSURGERY | Age: 37
End: 2022-07-29

## 2022-07-29 ENCOUNTER — HOSPITAL ENCOUNTER (EMERGENCY)
Facility: HOSPITAL | Age: 37
Discharge: HOME | End: 2022-07-29
Admitting: EMERGENCY MEDICINE

## 2022-07-29 VITALS
WEIGHT: 178 LBS | HEART RATE: 94 BPM | DIASTOLIC BLOOD PRESSURE: 85 MMHG | TEMPERATURE: 98.1 F | OXYGEN SATURATION: 99 % | SYSTOLIC BLOOD PRESSURE: 146 MMHG | BODY MASS INDEX: 26.36 KG/M2 | HEIGHT: 69 IN | RESPIRATION RATE: 18 BRPM

## 2022-07-29 VITALS
HEIGHT: 69 IN | OXYGEN SATURATION: 100 % | DIASTOLIC BLOOD PRESSURE: 86 MMHG | BODY MASS INDEX: 26.96 KG/M2 | HEART RATE: 72 BPM | SYSTOLIC BLOOD PRESSURE: 130 MMHG | WEIGHT: 182 LBS

## 2022-07-29 DIAGNOSIS — G43.009 MIGRAINE WITHOUT AURA AND WITHOUT STATUS MIGRAINOSUS, NOT INTRACTABLE: ICD-10-CM

## 2022-07-29 DIAGNOSIS — M79.642 LEFT HAND PAIN: Primary | ICD-10-CM

## 2022-07-29 DIAGNOSIS — M79.642 LEFT HAND PAIN: ICD-10-CM

## 2022-07-29 DIAGNOSIS — R22.32 LOCALIZED SWELLING ON LEFT HAND: ICD-10-CM

## 2022-07-29 DIAGNOSIS — M25.532 ACUTE PAIN OF LEFT WRIST: ICD-10-CM

## 2022-07-29 DIAGNOSIS — R42 DIZZINESS: Primary | ICD-10-CM

## 2022-07-29 DIAGNOSIS — S62.92XA CLOSED FRACTURE OF LEFT HAND, INITIAL ENCOUNTER: Primary | ICD-10-CM

## 2022-07-29 PROCEDURE — 99213 OFFICE O/P EST LOW 20 MIN: CPT | Performed by: NURSE PRACTITIONER

## 2022-07-29 PROCEDURE — 73110 X-RAY EXAM OF WRIST: CPT | Performed by: RADIOLOGY

## 2022-07-29 PROCEDURE — 73130 X-RAY EXAM OF HAND: CPT

## 2022-07-29 PROCEDURE — 73130 X-RAY EXAM OF HAND: CPT | Performed by: RADIOLOGY

## 2022-07-29 PROCEDURE — 73110 X-RAY EXAM OF WRIST: CPT

## 2022-07-29 PROCEDURE — 99282 EMERGENCY DEPT VISIT SF MDM: CPT

## 2022-07-29 RX ORDER — TOPIRAMATE 100 MG/1
100 CAPSULE, EXTENDED RELEASE ORAL DAILY
Qty: 30 CAPSULE | Refills: 3 | Status: SHIPPED | OUTPATIENT
Start: 2022-07-29

## 2022-07-29 RX ORDER — TOPIRAMATE 100 MG/1
1 CAPSULE, EXTENDED RELEASE ORAL
COMMUNITY

## 2022-07-29 RX ORDER — MECLIZINE HCL 25MG 25 MG/1
25 TABLET, CHEWABLE ORAL 3 TIMES DAILY PRN
COMMUNITY

## 2022-07-29 RX ORDER — OXYCODONE AND ACETAMINOPHEN 7.5; 325 MG/1; MG/1
1 TABLET ORAL EVERY 4 HOURS PRN
Qty: 12 TABLET | Refills: 0 | Status: SHIPPED | OUTPATIENT
Start: 2022-07-29

## 2022-07-29 NOTE — PROGRESS NOTES

## 2022-07-29 NOTE — LETTER
[facility] Neurosurgery  [address] [facility]  Phone: [phone]  Fax: [phone] Gonzales, DURAN        July 29, 2022      To whom it may concern,     Leopold Penner is seen and treated in our Neurology clinic on 07/29/2022. It is in my medical opinion that patient remain off work given persistent dizziness. If you have any questions please do not hesitate to reach out to our office at [phone].       Sincerely,        DURAN Davis

## 2022-07-29 NOTE — PROGRESS NOTES
[id number] [facility] Neurology Office Note      Patient:   Tiffany Lau  MR#:    [id number]  Account Number:                         YOB: 1985  Date of Evaluation:  7/29/2022  Time of Note:                          12:25 PM  Primary/Referring Physician:  No primary care provider on file. Consulting Physician:  Buddy Cruz, DNP, APRN    FOLLOW UP    Chief Complaint   Patient presents with    Follow-up     C/o fall after dizzy spell yesterday. Migraines are better     Concussion    Dizziness    Medication Refill         HISTORY OF PRESENT ILLNESS    Tiffany Lau is a 42y.o. year old male here for follow up of headaches and dizziness. Has noted improvement in headaches since last visit. No change in characteristics of headaches. Headache described as bilateral, band like sensation over the occipital/posterior areas of his head. He has more recently noted a left sided, retro orbital pain. Pain is described as a lightening bolt through his head. He notes light/sound sensitivity, nausea with headaches. He notes intermittent dizziness with headaches. Might note intermittent \"flashing\" type visual changes but does not always correlate these with headaches, occurs more with something coming into his visual field (like a ball being thrown to him). He will note numbness in his right arm with more severe headaches. No weakness. Watching TV, bending over makes headaches worse. On Trokendi, no side effects. Taking Imitrex prn which is beneficial. Noting 8- 10 headaches or migraines in a month. Still noting intermittent dizziness. He did have a fall yesterday due to dizziness, his left hand is swollen, bruised, difficulty with moving 4th/5th digit. Dizziness is described as room spinning around him. He does get nauseous with dizziness, vomiting noted at times. Seems like lights or looking down will trigger dizziness. Meclizine does help. Completed PT, primarily for neck pain but doing exercises for dizziness as well. Noting nightmares, seeing psychiatry. Symptoms began after a work accident in March 2020. A metal pipe hit him on the top of his head, knocked out 4 teeth, had LOC with event. Has seen [facility] neurosurgery for neck pain. History reviewed. No pertinent past medical history. Past Surgical History:   Procedure Laterality Date    DENTAL SURGERY         History reviewed. No pertinent family history. Social History     Socioeconomic History    Marital status: Single     Spouse name: Not on file    Number of children: Not on file    Years of education: Not on file    Highest education level: Not on file   Occupational History    Not on file   Tobacco Use    Smoking status: Every Day     Packs/day: 0.50     Types: Cigarettes    Smokeless tobacco: Never   Vaping Use    Vaping Use: Never used   Substance and Sexual Activity    Alcohol use: Not Currently    Drug use: Not Currently    Sexual activity: Not on file   Other Topics Concern    Not on file   Social History Narrative    Not on file     Social Determinants of Health     Financial Resource Strain: Not on file   Food Insecurity: Not on file   Transportation Needs: Not on file   Physical Activity: Not on file   Stress: Not on file   Social Connections: Not on file   Intimate Partner Violence: Not on file   Housing Stability: Not on file       Current Outpatient Medications   Medication Sig Dispense Refill    TROKENDI  MG CP24 Take 100 mg by mouth daily 30 capsule 3    meclizine (ANTIVERT) 25 MG tablet Take 1 tablet by mouth 3 times daily as needed for Dizziness 90 tablet 3    SUMAtriptan (IMITREX) 50 MG tablet Take 1 tablet at the onset of migraine. Can repeat once in 2 hours if no improvement. Do not exceed 2 tablets in 24 hours. 9 tablet 3     No current facility-administered medications for this visit.        No Known Allergies    REVIEW OF SYSTEMS  Constitutional: []Fever []Sweats []Chills [] Recent Injury [x] Denies all unless marked  HEENT:[]Headache [] Head Injury [] Hearing Loss  [] Sore Throat  [] Ear Ache [x] Denies all unless marked  Spine:  [] Neck pain  [] Back pain  [] Sciaticia  [x] Denies all unless marked  Cardiovascular:[]Heart Disease []Palpitations [] Chest Pain   [x] Denies all unless marked  Pulmonary: []Shortness of Breath []Cough   [x] Denies all unless marked  Psychiatric/Behavioral:[] Depression [] Anxiety [x] Denies all unless marked  Gastrointestinal: []Nausea  []Vomiting  []Abdominal Pain  []Constipation  []Diarrhea  [x] Denies all unless marked  Genitourinary:   [] Frequency  [] Urgency  [] Dysuria [] Incontinence  [x] Denies all unless marked  Extremities: []Pain  []Swelling  [x] Denies all unless marked  Musculoskeletal: [] Myalgias  [] Joint Pain  [] Arthritis [] Muscle Cramps [] Muscle Twitches  [x] Denies all unless marked  Sleep: []Insomnia[]Snoring []Restless Legs  []Sleep Apnea  []Daytime Sleepiness  [x] Denies all unless marked  Skin:[] Rash [] Color Change [x] Denies all unless marked  Neurological:[]Visual Disturbance [] Memory Loss []Loss of Balance []Slurred Speech []Weakness []Seizures  [x] Dizziness [x] Denies all unless marked    The MA has completed the ROS with the patient. I have reviewed it in its' entirety with the patient and agree with the documentation. PHYSICAL EXAM  /86   Pulse 72   Ht 5' 9\" (1.753 m)   Wt 182 lb (82.6 kg)   SpO2 100%   BMI 26.88 kg/m²       Constitutional - No acute distress    HEENT- Conjunctiva normal.  No scars, masses, or lesions over external nose or ears, no neck masses noted, no jugular vein distension, no bruit  Cardiac- Regular rate and rhythm  Pulmonary- Good expansion, normal effort without use of accessory muscles  Musculoskeletal - No significant wasting of muscles noted, no bony deformities  Extremities - No clubbing, cyanosis or edema; edema, bruising to left wrist, intact NV status   Skin - Warm, dry, and intact.   No rash, erythema, or pallor  Psychiatric - Mood, affect, and behavior appear normal      NEUROLOGICAL EXAM     Mental status   [x] Awake, alert, oriented   [x]Affect attention and concentration appear appropriate  [x]Recent and remote memory appears unremarkable  [x]Speech normal without dysarthria or aphasia, comprehension and repetition intact. COMMENTS:    Cranial Nerves [x]No VF deficit to confrontation,  no papilledema on fundoscopic exam.  [x]PERRLA, EOMI, no nystagmus, conjugate eye movements, no ptosis  [x]Face symmetric  [x]Facial sensation intact  [x]Tongue midline no atrophy or fasciculations present  [x]Palate midline, hearing to finger rub normal bilaterally  [x]Shoulder shrug and SCM testing normal bilaterally  COMMENTS:   Motor   [x]5/5 strength x 4 extremities  [x]Normal bulk and tone  [x]No tremor present  [x]No rigidity or bradykinesia noted  COMMENTS:   Sensory  [x]Sensation intact to light touch, pin prick, vibration, and proprioception BLE  [x]Sensation intact to light touch, pin prick, vibration, and proprioception BUE  COMMENTS:   Coordination [x]FTN normal bilaterally   [x]HTS normal bilaterally  [x]KAI normal bilaterally.    COMMENTS:   Reflexes  [x]Symmetric and non-pathological  [x]Toes down going bilaterally  [x]No clonus present  COMMENTS:   Gait                  [x]Normal steady gait    []Ataxic    []Spastic     []Magnetic     []Shuffling  COMMENTS:       LABS RECORD AND IMAGING REVIEW (As below and per HPI)    No results found for: [id number]  No results found for: WBC, HGB, HCT, MCV, PLT  No results found for: NA, K, CL, CO2, BUN, CREATININE, GLUCOSE, CALCIUM, PROT, LABALBU, BILITOT, ALKPHOS, AST, ALT, LABGLOM, GFRAA, AGRATIO, GLOB  No results found for: CHOL, TRIG, HDL, LDLCALC  No results found for: TSH, T4FREE  No results found for: CRP, SEDRATE     MRI brain (6/2020)- normal     MRA head (5/2021)- normal     MRI cervical spine (6/2020)- mild DDD, moderate NF narrowing at C3/4 and C6/7    Reviewed referral records ASSESSMENT:    Gayle Sauer is a 42y.o. year old male here for follow up of headaches and concussion. Exam is non focal. Prior MRI/MRA normal. Headaches overall much improved with Trokendi. Still noting dizziness however. He fell yesterday due to dizziness and injured wrist. Swollen and bruised today, will plan for x-rays today, may need to consult ortho. Patient needs dedicated course of vestibular rehab, issues with workman's comp paying for this however. Headaches and dizziness began after hitting his head on a large metal pipe at work, LOC noted. Suspect post concussive in nature. Given continued dizziness will hold off on returning to work for now, will re-try PT. Will re-evaluate at next visit. ICD-10-CM    1. Dizziness  R42       2. Migraine without aura and without status migrainosus, not intractable  G43.009 TROKENDI  MG CP24      3. Left hand pain  M79.642 XR FINGER LEFT (MIN 2 VIEWS)     XR WRIST LEFT (MIN 3 VIEWS)     CANCELED: XR HAND LEFT (2 VIEWS)     CANCELED: XR WRIST LEFT (MIN 3 VIEWS)      4. Localized swelling on left hand  R22.32 XR FINGER LEFT (MIN 2 VIEWS)     XR WRIST LEFT (MIN 3 VIEWS)     CANCELED: XR HAND LEFT (2 VIEWS)     CANCELED: XR WRIST LEFT (MIN 3 VIEWS)      5. Acute pain of left wrist  M25.532 XR FINGER LEFT (MIN 2 VIEWS)     XR WRIST LEFT (MIN 3 VIEWS)     CANCELED: XR HAND LEFT (2 VIEWS)     CANCELED: XR WRIST LEFT (MIN 3 VIEWS)          PLAN:  1. Continue Trokendi XR 100mg daily. Discussed side effects including renal stones, paresthesias and cognitive changes. 2. Continue Imitrex prn   3. Continue Meclizine prn   4.. Look into vestibular rehab, patient reports denied by WC   5. X-ray of left hand/wrist given swelling/bruising s/p fall   6. Off work given persistent dizziness, letter given to patient   7. Continue follow up with neurosurgery as previously scheduled   8.  Return in about 4 months (around 11/29/2022) for follow up, sooner if worsening.     Deanna Ryan DNP, APRN

## 2022-07-29 NOTE — TELEPHONE ENCOUNTER
----- Message from DURAN Ramirez sent at 7/29/2022  1:09 PM CDT -----  Referral placed to ortho for finger fracture, he might need [facility]

## 2022-07-29 NOTE — Clinical Note
Can you see what's going on with vestibular rehab?  Patient reports it was denied   Marychuy Delgado

## 2022-10-13 ENCOUNTER — TELEPHONE (OUTPATIENT)
Dept: PHYSICAL THERAPY | Facility: CLINIC | Age: 37
End: 2022-10-13

## 2022-10-13 NOTE — TELEPHONE ENCOUNTER
Pt came to the clinic asking to complete a FCE for disability. Called him back and advised that his insurance is not accepted at our facility and private pay rate is $600 for the disability FCE. He plans to seek other options or return call to schedule if private pay FCE is needed.

## 2022-11-29 ENCOUNTER — OFFICE VISIT (OUTPATIENT)
Dept: NEUROLOGY | Age: 37
End: 2022-11-29
Payer: COMMERCIAL

## 2022-11-29 VITALS
HEART RATE: 86 BPM | HEIGHT: 69 IN | OXYGEN SATURATION: 97 % | BODY MASS INDEX: 26.96 KG/M2 | DIASTOLIC BLOOD PRESSURE: 107 MMHG | SYSTOLIC BLOOD PRESSURE: 153 MMHG | WEIGHT: 182 LBS

## 2022-11-29 DIAGNOSIS — Z00.00 ENCOUNTER FOR MEDICAL EXAMINATION TO ESTABLISH CARE: ICD-10-CM

## 2022-11-29 DIAGNOSIS — R42 DIZZINESS: ICD-10-CM

## 2022-11-29 DIAGNOSIS — G43.009 MIGRAINE WITHOUT AURA AND WITHOUT STATUS MIGRAINOSUS, NOT INTRACTABLE: Primary | ICD-10-CM

## 2022-11-29 PROCEDURE — 99213 OFFICE O/P EST LOW 20 MIN: CPT | Performed by: NURSE PRACTITIONER

## 2022-11-29 RX ORDER — SUMATRIPTAN 50 MG/1
TABLET, FILM COATED ORAL
Qty: 9 TABLET | Refills: 3 | Status: SHIPPED | OUTPATIENT
Start: 2022-11-29

## 2022-11-29 RX ORDER — MECLIZINE HYDROCHLORIDE 25 MG/1
25 TABLET ORAL 3 TIMES DAILY PRN
Qty: 90 TABLET | Refills: 3 | Status: SHIPPED | OUTPATIENT
Start: 2022-11-29

## 2022-11-29 NOTE — PROGRESS NOTES
[facility] Neurology Office Note      Patient:   Lindsay Wiley  MR#:    [id number]  Account Number:                         YOB: 1985  Date of Evaluation:  11/29/2022  Time of Note:                          9:31 AM  Primary/Referring Physician:  No primary care provider on file. Consulting Physician:  Conrad Dumont, ALPHONSO, APRN    FOLLOW UP    Chief Complaint   Patient presents with    Follow-up     4 month    Migraine     C/o migraine due to not being able to get medication. Wc not responding     Dizziness       HISTORY OF PRESENT ILLNESS    Lindsay Wiley is a 42y.o. year old male here for follow up of headaches and dizziness. Has noted some worsening migraines since last visit. Feels this is related to not being able to get the Meclizine and Imitrex due to workman's comp issues. No change in characteristics of headaches. Headache described as bilateral, band like sensation over the occipital/posterior areas of his head. He has more recently noted a left sided, retro orbital pain. Pain is described as a lightening bolt through his head. He notes light/sound sensitivity, nausea with headaches. He notes intermittent dizziness with headaches. Might note intermittent \"flashing\" type visual changes but does not always correlate these with headaches, occurs more with something coming into his visual field (like a ball being thrown to him). He will note numbness in his right arm with more severe headaches. No weakness. Watching TV, bending over makes headaches worse. Taking Trokendi which has overall been quite beneficial. Hasn't been able to get Imitrex or Meclizine due to [facility] [employer] comp issues, getting samples of Trokendi from us. Noting 10 headaches and around 15 migraines in a month now. He thinks this is mostly related to not having appropriate medications to abort the migraine. Still noting intermittent dizziness. Dizziness is described as room spinning around him.  He does get nauseous with dizziness, vomiting noted at times. Seems like lights or looking down will trigger dizziness. Meclizine does help. Completed PT, primarily for neck pain but doing exercises for dizziness as well. Noting nightmares, seeing psychiatry. Symptoms began after a work accident in March 2020. A metal pipe hit him on the top of his head, knocked out 4 teeth, had LOC with event. Has seen [facility] neurosurgery for neck pain. History reviewed. No pertinent past medical history. Past Surgical History:   Procedure Laterality Date    DENTAL SURGERY       History reviewed. No pertinent family history. Social History     Socioeconomic History    Marital status: Single     Spouse name: Not on file    Number of children: Not on file    Years of education: Not on file    Highest education level: Not on file   Occupational History    Not on file   Tobacco Use    Smoking status: Every Day     Packs/day: 0.50     Types: Cigarettes    Smokeless tobacco: Never   Vaping Use    Vaping Use: Never used   Substance and Sexual Activity    Alcohol use: Not Currently    Drug use: Not Currently    Sexual activity: Not on file   Other Topics Concern    Not on file   Social History Narrative    Not on file     Social Determinants of Health     Financial Resource Strain: Not on file   Food Insecurity: Not on file   Transportation Needs: Not on file   Physical Activity: Not on file   Stress: Not on file   Social Connections: Not on file   Intimate Partner Violence: Not on file   Housing Stability: Not on file       Current Outpatient Medications   Medication Sig Dispense Refill    meclizine (ANTIVERT) 25 MG tablet Take 1 tablet by mouth 3 times daily as needed for Dizziness 90 tablet 3    SUMAtriptan (IMITREX) 50 MG tablet Take 1 tablet at the onset of migraine. Can repeat once in 2 hours if no improvement. Do not exceed 2 tablets in 24 hours.  9 tablet 3    TROKENDI  MG CP24 Take 100 mg by mouth daily 30 capsule 3     No current facility-administered medications for this visit. No Known Allergies    REVIEW OF SYSTEMS  Constitutional: []Fever []Sweats []Chills [] Recent Injury [x] Denies all unless marked  HEENT:[x]Headache  [] Head Injury [] Hearing Loss  [] Sore Throat  [] Ear Ache [x] Denies all unless marked  Spine:  [] Neck pain  [] Back pain  [] Sciaticia  [x] Denies all unless marked  Cardiovascular:[]Heart Disease []Palpitations [] Chest Pain   [x] Denies all unless marked  Pulmonary: []Shortness of Breath []Cough   [x] Denies all unless marked  Psychiatric/Behavioral:[] Depression [] Anxiety [x] Denies all unless marked  Gastrointestinal: []Nausea  []Vomiting  []Abdominal Pain  []Constipation  []Diarrhea  [x] Denies all unless marked  Genitourinary:   [] Frequency  [] Urgency  [] Dysuria [] Incontinence  [x] Denies all unless marked  Extremities: []Pain  []Swelling  [x] Denies all unless marked  Musculoskeletal: [] Myalgias  [] Joint Pain  [] Arthritis [] Muscle Cramps [] Muscle Twitches  [x] Denies all unless marked  Sleep: []Insomnia[]Snoring []Restless Legs  []Sleep Apnea  []Daytime Sleepiness  [x] Denies all unless marked  Skin:[] Rash [] Color Change [x] Denies all unless marked   Neurological:[]Visual Disturbance [] Memory Loss []Loss of Balance []Slurred Speech []Weakness []Seizures  [x] Dizziness [x] Denies all unless marked    The MA has completed the ROS with the patient. I have reviewed it in its' entirety with the patient and agree with the documentation.      PHYSICAL EXAM  BP (!) 153/107   Pulse 86   Ht 5' 9\" (1.753 m)   Wt 182 lb (82.6 kg)   SpO2 97%   BMI 26.88 kg/m²       Constitutional - No acute distress    HEENT- Conjunctiva normal.  No scars, masses, or lesions over external nose or ears, no neck masses noted, no jugular vein distension, no bruit  Cardiac- Regular rate and rhythm  Pulmonary- Good expansion, normal effort without use of accessory muscles  Musculoskeletal - No significant wasting of muscles noted, no bony deformities  Extremities - No clubbing, cyanosis or edema  Skin - Warm, dry, and intact. No rash, erythema, or pallor  Psychiatric - Mood, affect, and behavior appear normal      NEUROLOGICAL EXAM     Mental status   [x] Awake, alert, oriented   [x]Affect attention and concentration appear appropriate  [x]Recent and remote memory appears unremarkable  [x]Speech normal without dysarthria or aphasia, comprehension and repetition intact. COMMENTS:    Cranial Nerves [x]No VF deficit to confrontation,  no papilledema on fundoscopic exam.  [x]PERRLA, EOMI, no nystagmus, conjugate eye movements, no ptosis  [x]Face symmetric  [x]Facial sensation intact  [x]Tongue midline no atrophy or fasciculations present  [x]Palate midline, hearing to finger rub normal bilaterally  [x]Shoulder shrug and SCM testing normal bilaterally  COMMENTS:   Motor   [x]5/5 strength x 4 extremities  [x]Normal bulk and tone  [x]No tremor present  [x]No rigidity or bradykinesia noted  COMMENTS:   Sensory  [x]Sensation intact to light touch, pin prick, vibration, and proprioception BLE  [x]Sensation intact to light touch, pin prick, vibration, and proprioception BUE  COMMENTS:   Coordination [x]FTN normal bilaterally   [x]HTS normal bilaterally  [x]KAI normal bilaterally.    COMMENTS:   Reflexes  [x]Symmetric and non-pathological  [x]Toes down going bilaterally  [x]No clonus present  COMMENTS:   Gait                  [x]Normal steady gait    []Ataxic    []Spastic     []Magnetic     []Shuffling  COMMENTS:       LABS RECORD AND IMAGING REVIEW (As below and per HPI)    No results found for: [id number]  No results found for: WBC, HGB, HCT, MCV, PLT  No results found for: NA, K, CL, CO2, BUN, CREATININE, GLUCOSE, CALCIUM, PROT, LABALBU, BILITOT, ALKPHOS, AST, ALT, LABGLOM, GFRAA, AGRATIO, GLOB  No results found for: CHOL, TRIG, HDL, LDLCALC  No results found for: TSH, T4FREE  No results found for: CRP, SEDRATE     MRI brain (6/2020)- normal     MRA head (5/2021)- normal     MRI cervical spine (6/2020)- mild DDD, moderate NF narrowing at C3/4 and C6/7    Reviewed referral records     ASSESSMENT:    Gisel Higuera is a 42y.o. year old male here for follow up of headaches, dizziness and concussion. Has noted some worsening migraines since last visit but relates this to not getting his medications d/t workman's comp issues. Exam is non focal. Prior MRI/MRA normal. Headaches overall much improved until recent with Trokendi. Continue to note dizziness as well. Headaches and dizziness began after hitting his head on a large metal pipe at work, LOC noted. Suspect post concussive in nature. Patient needs dedicated course of vestibular rehab, issues with workman's comp paying for this however. Will continue with Trokendi samples for patient as this has been quite beneficial for patient. Patient will try prescription savings card for Meclizine and Imitrex as workman's comp is not paying for these medications despite clear improvement while on them. He is hypertensive today in the office but asymptomatic. Will refer to primary care to establish care. Discussed cautious use of Imitrex in the setting of HTN although he typically has not had hypertension issues in the past.       ICD-10-CM    1. Migraine without aura and without status migrainosus, not intractable  G43.009       2. Dizziness  R42       3. Encounter for medical examination to establish care  Z00.00 Referral for No Primary Care Physician - Routine        PLAN:  1. Continue Trokendi XR 100mg daily. Discussed side effects including renal stones, paresthesias and cognitive changes. 2. Continue Imitrex prn although need to monitor blood pressure/use cautiously in the setting in HTN. He is asymptomatic from this today however   3. Continue Meclizine prn. Discussed OTC alternatives as well given issues with workman's comp   4.  Patient would benefit from a dedicated course of vestibular rehab, patient reports denied by WC   5. Continue follow up with neurosurgery as previously scheduled   6. Return in about 4 months (around 3/29/2023) for follow up, sooner if worsening.     Horace Nelson DNP, APRN

## 2022-12-12 ENCOUNTER — TELEPHONE (OUTPATIENT)
Dept: NEUROLOGY | Age: 37
End: 2022-12-12

## 2022-12-12 NOTE — TELEPHONE ENCOUNTER
I can send Nortriptyline to the pharmacy instead of the Trokendi. I don't think we have many other options at this point.

## 2022-12-12 NOTE — TELEPHONE ENCOUNTER
We still have no samples of trokendi, they company said they were waiting on a rep for our area. What do we need to do in the mean time?

## 2022-12-12 NOTE — TELEPHONE ENCOUNTER
Spencer London requests that Zi return their call. The best time to reach him is Anytime. Thank you.

## 2022-12-13 DIAGNOSIS — G43.009 MIGRAINE WITHOUT AURA AND WITHOUT STATUS MIGRAINOSUS, NOT INTRACTABLE: ICD-10-CM

## 2022-12-13 RX ORDER — MECLIZINE HYDROCHLORIDE 25 MG/1
25 TABLET ORAL 3 TIMES DAILY PRN
Qty: 90 TABLET | Refills: 3 | OUTPATIENT
Start: 2022-12-13

## 2022-12-13 RX ORDER — SUMATRIPTAN 50 MG/1
TABLET, FILM COATED ORAL
Qty: 9 TABLET | Refills: 3 | OUTPATIENT
Start: 2022-12-13

## 2022-12-13 RX ORDER — TOPIRAMATE 100 MG/1
100 CAPSULE, EXTENDED RELEASE ORAL DAILY
Qty: 30 CAPSULE | Refills: 3 | Status: SHIPPED | OUTPATIENT
Start: 2022-12-13

## 2022-12-13 NOTE — TELEPHONE ENCOUNTER
Requested Prescriptions     Pending Prescriptions Disp Refills    TROKENDI  MG CP24 30 capsule 3     Sig: Take 100 mg by mouth daily     Last Office Visit: 7/29/2022  Next Office Visit: 3/30/23  Last Medication Refill: 7/29/22 with 3 RF

## 2022-12-13 NOTE — TELEPHONE ENCOUNTER
Called pt, he informed me this mornign that the  needs these scripts sent to [facility] in [place].  Looks like these have already been sent to eg to sign at this point

## 2023-01-06 ENCOUNTER — OFFICE VISIT (OUTPATIENT)
Dept: PRIMARY CARE CLINIC | Age: 38
End: 2023-01-06

## 2023-01-06 VITALS
DIASTOLIC BLOOD PRESSURE: 86 MMHG | TEMPERATURE: 96.9 F | OXYGEN SATURATION: 97 % | SYSTOLIC BLOOD PRESSURE: 130 MMHG | BODY MASS INDEX: 27.16 KG/M2 | HEART RATE: 72 BPM | WEIGHT: 183.4 LBS | HEIGHT: 69 IN | RESPIRATION RATE: 16 BRPM

## 2023-01-06 DIAGNOSIS — Z76.89 ENCOUNTER TO ESTABLISH CARE: Primary | ICD-10-CM

## 2023-01-06 DIAGNOSIS — Z11.59 NEED FOR HEPATITIS C SCREENING TEST: ICD-10-CM

## 2023-01-06 DIAGNOSIS — Z11.4 SCREENING FOR HIV WITHOUT PRESENCE OF RISK FACTORS: ICD-10-CM

## 2023-01-06 DIAGNOSIS — R03.0 ELEVATED BP WITHOUT DIAGNOSIS OF HYPERTENSION: ICD-10-CM

## 2023-01-06 LAB
ALBUMIN SERPL-MCNC: 4.8 G/DL (ref 3.5–5.2)
ALP BLD-CCNC: 69 U/L (ref 40–130)
ALT SERPL-CCNC: 38 U/L (ref 5–41)
ANION GAP SERPL CALCULATED.3IONS-SCNC: 13 MMOL/L (ref 7–19)
AST SERPL-CCNC: 20 U/L (ref 5–40)
BASOPHILS ABSOLUTE: 0.1 K/UL (ref 0–0.2)
BASOPHILS RELATIVE PERCENT: 0.8 % (ref 0–1)
BILIRUB SERPL-MCNC: <0.2 MG/DL (ref 0.2–1.2)
BUN BLDV-MCNC: 11 MG/DL (ref 6–20)
CALCIUM SERPL-MCNC: 9.8 MG/DL (ref 8.6–10)
CHLORIDE BLD-SCNC: 104 MMOL/L (ref 98–111)
CO2: 26 MMOL/L (ref 22–29)
CREAT SERPL-MCNC: 0.8 MG/DL (ref 0.5–1.2)
EOSINOPHILS ABSOLUTE: 0.4 K/UL (ref 0–0.6)
EOSINOPHILS RELATIVE PERCENT: 2.3 % (ref 0–5)
GFR SERPL CREATININE-BSD FRML MDRD: >60 ML/MIN/{1.73_M2}
GLUCOSE BLD-MCNC: 103 MG/DL (ref 74–109)
HCT VFR BLD CALC: 48.2 % (ref 42–52)
HEMOGLOBIN: 16.2 G/DL (ref 14–18)
HEPATITIS C ANTIBODY INTERPRETATION: NORMAL
IMMATURE GRANULOCYTES #: 0.1 K/UL
LYMPHOCYTES ABSOLUTE: 3.5 K/UL (ref 1.1–4.5)
LYMPHOCYTES RELATIVE PERCENT: 22.3 % (ref 20–40)
MCH RBC QN AUTO: 28.8 PG (ref 27–31)
MCHC RBC AUTO-ENTMCNC: 33.6 G/DL (ref 33–37)
MCV RBC AUTO: 85.6 FL (ref 80–94)
MONOCYTES ABSOLUTE: 0.9 K/UL (ref 0–0.9)
MONOCYTES RELATIVE PERCENT: 6 % (ref 0–10)
NEUTROPHILS ABSOLUTE: 10.5 K/UL (ref 1.5–7.5)
NEUTROPHILS RELATIVE PERCENT: 68.2 % (ref 50–65)
PDW BLD-RTO: 13.1 % (ref 11.5–14.5)
PLATELET # BLD: 389 K/UL (ref 130–400)
PMV BLD AUTO: 11.1 FL (ref 9.4–12.4)
POTASSIUM SERPL-SCNC: 3.7 MMOL/L (ref 3.5–5)
RBC # BLD: 5.63 M/UL (ref 4.7–6.1)
SODIUM BLD-SCNC: 143 MMOL/L (ref 136–145)
TOTAL PROTEIN: 7.3 G/DL (ref 6.6–8.7)
WBC # BLD: 15.5 K/UL (ref 4.8–10.8)

## 2023-01-06 ASSESSMENT — PATIENT HEALTH QUESTIONNAIRE - PHQ9
5. POOR APPETITE OR OVEREATING: 2
6. FEELING BAD ABOUT YOURSELF - OR THAT YOU ARE A FAILURE OR HAVE LET YOURSELF OR YOUR FAMILY DOWN: 1
10. IF YOU CHECKED OFF ANY PROBLEMS, HOW DIFFICULT HAVE THESE PROBLEMS MADE IT FOR YOU TO DO YOUR WORK, TAKE CARE OF THINGS AT HOME, OR GET ALONG WITH OTHER PEOPLE: 1
4. FEELING TIRED OR HAVING LITTLE ENERGY: 3
3. TROUBLE FALLING OR STAYING ASLEEP: 1
SUM OF ALL RESPONSES TO PHQ9 QUESTIONS 1 & 2: 5
SUM OF ALL RESPONSES TO PHQ QUESTIONS 1-9: 13
7. TROUBLE CONCENTRATING ON THINGS, SUCH AS READING THE NEWSPAPER OR WATCHING TELEVISION: 0
2. FEELING DOWN, DEPRESSED OR HOPELESS: 3
8. MOVING OR SPEAKING SO SLOWLY THAT OTHER PEOPLE COULD HAVE NOTICED. OR THE OPPOSITE, BEING SO FIGETY OR RESTLESS THAT YOU HAVE BEEN MOVING AROUND A LOT MORE THAN USUAL: 1
1. LITTLE INTEREST OR PLEASURE IN DOING THINGS: 2
9. THOUGHTS THAT YOU WOULD BE BETTER OFF DEAD, OR OF HURTING YOURSELF: 0
SUM OF ALL RESPONSES TO PHQ QUESTIONS 1-9: 13

## 2023-01-06 ASSESSMENT — ENCOUNTER SYMPTOMS
CHEST TIGHTNESS: 0
WHEEZING: 0
BLOOD IN STOOL: 0
SHORTNESS OF BREATH: 0
ABDOMINAL PAIN: 0

## 2023-01-06 NOTE — PROGRESS NOTES
Felix Sharif (:  1985) is a 42 y.o. male,New patient, here for evaluation of the following chief complaint(s):  New Patient (Needs to establish with a PCP, pt states at last Neurologist appt his BP and heart rate were high)         ASSESSMENT/PLAN:  1. Encounter to establish care  2. Screening for HIV without presence of risk factors  -     HIV-1,2 Combo Ag/Ab By AMENA, Reflexive Panel; Future  3. Elevated BP without diagnosis of hypertension  -     Comprehensive Metabolic Panel; Future  -     CBC with Auto Differential; Future  4. Need for hepatitis C screening test  -     Hepatitis C Antibody; Future    I personally reviewed patient's documented blood pressure history. There have been a couple of mild elevations with 1 more systolic elevation however these are small windows into a larger picture and I do believe that it would be in patient's best interest to gather more data before treating. Patient given handwritten prescription for blood pressure monitor and encouraged to check his blood pressure at least once every other day and bring a log of this to his next appointment. We will decide at that time as to whether he needs long-term treatment. Patient encouraged to go to an emergency department if his blood pressure peaks over 338 systolic. Will obtain baseline labs at this time as patient has not had this done in order to approximate kidney and liver function among other metrics. Patient was agreeable to HIV and hepatitis C screening at this time    Return in about 2 months (around 3/6/2023). Subjective   SUBJECTIVE/OBJECTIVE:  Felix Sharif is a 42 y.o. male who presents to establish care. Patient regularly follows up with neurology and states that his blood pressure has been mildly elevated some of his visits and they recommended he establish with someone. 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   Patient was unconscious for an extended period and had this several injuries related to this event. Patient regularly follows up with neurology for management of persistent headaches and his TBI every 2 to 3 months. Patient notes that he does not particularly want to start any new medication if he does not have to. Patient says that he quit smoking 6 months ago but previously smoked 1 pack/day for the last 15 years. Patient says he has been feeling much better since stopping smoking. Patient has no other acute concerns at this time          Review of Systems   Constitutional:  Negative for activity change and fever. HENT:  Negative for congestion. Eyes:  Negative for visual disturbance. Respiratory:  Negative for chest tightness, shortness of breath and wheezing. Cardiovascular:  Negative for chest pain. Gastrointestinal:  Negative for abdominal pain and blood in stool. Genitourinary:  Negative for difficulty urinating and urgency. Neurological:  Negative for weakness and headaches. Psychiatric/Behavioral:  Negative for confusion. Objective   Physical Exam  Vitals reviewed. Constitutional:       General: He is not in acute distress. Appearance: Normal appearance. He is not ill-appearing. HENT:      Head: Normocephalic and atraumatic. Cardiovascular:      Rate and Rhythm: Normal rate and regular rhythm. Pulses: Normal pulses. Heart sounds: Normal heart sounds. No murmur heard. Pulmonary:      Effort: Pulmonary effort is normal. No respiratory distress. Breath sounds: Normal breath sounds. No wheezing or rhonchi. Chest:      Chest wall: No tenderness. Abdominal:      General: Abdomen is flat. Bowel sounds are normal. There is no distension. Palpations: Abdomen is soft. Tenderness: There is no abdominal tenderness. Musculoskeletal:         General: No swelling. Skin:     General: Skin is warm and dry. Neurological:      General: No focal deficit present.       Mental Status: He is alert.          Vitals:    01/06/23 0858   BP: 130/86   Pulse: 72   Resp: 16   Temp: 96.9 °F (36.1 °C)   SpO2: 97%        Current Outpatient Medications   Medication Sig Dispense Refill    TROKENDI  MG CP24 Take 100 mg by mouth daily 30 capsule 3    SUMAtriptan (IMITREX) 50 MG tablet Take 1 tablet at the onset of migraine. Can repeat once in 2 hours if no improvement. Do not exceed 2 tablets in 24 hours. 9 tablet 3    meclizine (ANTIVERT) 25 MG tablet Take 1 tablet by mouth 3 times daily as needed for Dizziness (Patient not taking: Reported on 1/6/2023) 90 tablet 3     No current facility-administered medications for this visit. No family history on file. Past Medical History:   Diagnosis Date    Migraine       Past Surgical History:   Procedure Laterality Date    DENTAL SURGERY        No Known Allergies     No results found for: NA, K, CL, CO2, BUN, CREATININE, GLUCOSE, CALCIUM, PROT, LABALBU, BILITOT, ALKPHOS, AST, ALT, LABGLOM, GFRAA, AGRATIO, GLOB   No results found for: WBC, HGB, HCT, MCV, PLT             EMR Dragon/transcription disclaimer:  Much of this encounter note is electronic transcription/translation of spoken language toprinted texts. The electronic translation of spoken language may be erroneous, or at times, nonsensical words or phrases may be inadvertently transcribed. Although I have reviewed the note for such errors, some may stillexist.      An electronic signature was used to authenticate this note.     --Roxie Polo MD

## 2023-01-09 LAB — HIV 1,2 COMBO ANTIGEN/ANTIBODY: NEGATIVE

## 2023-03-07 ENCOUNTER — OFFICE VISIT (OUTPATIENT)
Dept: PRIMARY CARE CLINIC | Age: 38
End: 2023-03-07

## 2023-03-07 VITALS
DIASTOLIC BLOOD PRESSURE: 82 MMHG | OXYGEN SATURATION: 97 % | RESPIRATION RATE: 16 BRPM | HEART RATE: 76 BPM | WEIGHT: 179 LBS | HEIGHT: 69 IN | SYSTOLIC BLOOD PRESSURE: 124 MMHG | BODY MASS INDEX: 26.51 KG/M2 | TEMPERATURE: 97.7 F

## 2023-03-07 DIAGNOSIS — I10 HYPERTENSION, UNSPECIFIED TYPE: Primary | ICD-10-CM

## 2023-03-07 RX ORDER — LISINOPRIL 10 MG/1
10 TABLET ORAL DAILY
Qty: 90 TABLET | Refills: 1 | Status: SHIPPED | OUTPATIENT
Start: 2023-03-07

## 2023-03-07 ASSESSMENT — ENCOUNTER SYMPTOMS
BLOOD IN STOOL: 0
CHEST TIGHTNESS: 0
ABDOMINAL PAIN: 0
WHEEZING: 0
SHORTNESS OF BREATH: 0

## 2023-03-30 ENCOUNTER — OFFICE VISIT (OUTPATIENT)
Dept: NEUROLOGY | Age: 38
End: 2023-03-30

## 2023-03-30 VITALS
HEART RATE: 78 BPM | WEIGHT: 179 LBS | BODY MASS INDEX: 26.51 KG/M2 | HEIGHT: 69 IN | DIASTOLIC BLOOD PRESSURE: 93 MMHG | SYSTOLIC BLOOD PRESSURE: 143 MMHG | OXYGEN SATURATION: 98 %

## 2023-03-30 DIAGNOSIS — G43.009 MIGRAINE WITHOUT AURA AND WITHOUT STATUS MIGRAINOSUS, NOT INTRACTABLE: Primary | ICD-10-CM

## 2023-03-30 DIAGNOSIS — R42 DIZZINESS: ICD-10-CM

## 2023-03-30 RX ORDER — SUMATRIPTAN 50 MG/1
TABLET, FILM COATED ORAL
Qty: 9 TABLET | Refills: 3 | Status: SHIPPED | OUTPATIENT
Start: 2023-03-30

## 2023-03-30 RX ORDER — MECLIZINE HYDROCHLORIDE 25 MG/1
25 TABLET ORAL 3 TIMES DAILY PRN
Qty: 90 TABLET | Refills: 3 | Status: SHIPPED | OUTPATIENT
Start: 2023-03-30

## 2023-03-30 NOTE — PROGRESS NOTES
[facility] Neurology Office Note      Patient:   Natacha Plasencia  MR#:    [id number]  Account Number:                         YOB: 1985  Date of Evaluation:  3/30/2023  Time of Note:                          3:55 PM  Primary/Referring Physician:  Jennifer Hills MD   Consulting Physician:  Luis Gonsalez, DNP, APRN    FOLLOW UP    Chief Complaint   Patient presents with    Follow-up    Migraine     Pt states migraines are about the same     Dizziness     C/o dizziness     Medication Refill     HISTORY OF PRESENT ILLNESS    Natacha Plasencia is a 42y.o. year old male here for follow up of headaches and dizziness. Has noted improvement from prior visit. No change in characteristics of headaches. Headache described as bilateral, band like sensation over the occipital/posterior areas of his head. He has more recently noted a left sided, retro orbital pain. Pain is described as a lightening bolt through his head. He notes light/sound sensitivity, nausea with headaches. He notes intermittent dizziness with headaches. Might note intermittent \"flashing\" type visual changes but does not always correlate these with headaches, occurs more with something coming into his visual field (like a ball being thrown to him). He will note numbness in his right arm with more severe headaches. No weakness. Watching TV, bending over makes headaches worse. On Trokendi and Imitrex prn. Noting around 15 headaches in a month however intensity and length of migraine has improved. Still noting intermittent dizziness. Dizziness is described as room spinning around him. He does get nauseous with dizziness, vomiting noted at times. Seems like lights or looking down will trigger dizziness. Meclizine does help. Has done PT in the past, some improvement with this. Noting nightmares, seeing psychiatry. Symptoms began after a work accident in March 2020. A metal pipe hit him on the top of his head, knocked out 4 teeth, had LOC with event.  Has seen

## 2023-04-21 DIAGNOSIS — G43.009 MIGRAINE WITHOUT AURA AND WITHOUT STATUS MIGRAINOSUS, NOT INTRACTABLE: ICD-10-CM

## 2023-04-21 RX ORDER — TOPIRAMATE 100 MG/1
CAPSULE, EXTENDED RELEASE ORAL
Qty: 30 CAPSULE | Refills: 3 | OUTPATIENT
Start: 2023-04-21

## 2023-04-24 RX ORDER — TOPIRAMATE 100 MG/1
CAPSULE, EXTENDED RELEASE ORAL
Qty: 30 CAPSULE | Refills: 3 | Status: SHIPPED | OUTPATIENT
Start: 2023-04-24

## 2023-09-28 ENCOUNTER — OFFICE VISIT (OUTPATIENT)
Dept: NEUROLOGY | Age: 38
End: 2023-09-28

## 2023-09-28 VITALS
OXYGEN SATURATION: 97 % | HEIGHT: 69 IN | SYSTOLIC BLOOD PRESSURE: 122 MMHG | HEART RATE: 87 BPM | DIASTOLIC BLOOD PRESSURE: 74 MMHG | WEIGHT: 179 LBS | BODY MASS INDEX: 26.51 KG/M2

## 2023-09-28 DIAGNOSIS — R42 DIZZINESS: ICD-10-CM

## 2023-09-28 DIAGNOSIS — G43.009 MIGRAINE WITHOUT AURA AND WITHOUT STATUS MIGRAINOSUS, NOT INTRACTABLE: Primary | ICD-10-CM

## 2023-09-28 RX ORDER — NORTRIPTYLINE HYDROCHLORIDE 25 MG/1
25 CAPSULE ORAL NIGHTLY
Qty: 30 CAPSULE | Refills: 3 | Status: SHIPPED | OUTPATIENT
Start: 2023-09-28

## 2023-09-28 RX ORDER — SUMATRIPTAN 50 MG/1
TABLET, FILM COATED ORAL
Qty: 9 TABLET | Refills: 3 | Status: SHIPPED | OUTPATIENT
Start: 2023-09-28

## 2023-09-28 RX ORDER — MECLIZINE HYDROCHLORIDE 25 MG/1
25 TABLET ORAL 3 TIMES DAILY PRN
Qty: 90 TABLET | Refills: 3 | Status: SHIPPED | OUTPATIENT
Start: 2023-09-28

## 2023-10-31 RX ORDER — NORTRIPTYLINE HYDROCHLORIDE 25 MG/1
25 CAPSULE ORAL NIGHTLY
Qty: 30 CAPSULE | Refills: 3 | Status: SHIPPED | OUTPATIENT
Start: 2023-10-31

## 2023-10-31 NOTE — TELEPHONE ENCOUNTER
Requested Prescriptions     Pending Prescriptions Disp Refills    nortriptyline (PAMELOR) 25 MG capsule 30 capsule 3     Sig: Take 1 capsule by mouth nightly       Last Office Visit: 9/28/2023  Next Office Visit: 1/23/2024  Last Medication Refill:09/28/23

## 2023-11-24 DIAGNOSIS — G43.009 MIGRAINE WITHOUT AURA AND WITHOUT STATUS MIGRAINOSUS, NOT INTRACTABLE: ICD-10-CM

## 2023-11-27 RX ORDER — TOPIRAMATE 100 MG/1
1 CAPSULE, EXTENDED RELEASE ORAL DAILY
Qty: 30 CAPSULE | Refills: 3 | Status: SHIPPED | OUTPATIENT
Start: 2023-11-27

## 2023-11-27 NOTE — TELEPHONE ENCOUNTER
Requested Prescriptions     Pending Prescriptions Disp Refills    TROKENDI  MG CP24 [Pharmacy Med Name: Cristina Remigio XR 100MG CAPSULES] 30 capsule 3     Sig: TAKE ONE CAPSULE BY MOUTH DAILY       Last Office Visit: 9/28/2023  Next Office Visit: 1/23/2024  Last Medication Refill:4/24/2023 with 3 RF

## 2024-01-23 ENCOUNTER — OFFICE VISIT (OUTPATIENT)
Dept: NEUROLOGY | Age: 39
End: 2024-01-23
Payer: COMMERCIAL

## 2024-01-23 VITALS
OXYGEN SATURATION: 98 % | HEART RATE: 79 BPM | HEIGHT: 69 IN | WEIGHT: 179 LBS | BODY MASS INDEX: 26.51 KG/M2 | DIASTOLIC BLOOD PRESSURE: 92 MMHG | SYSTOLIC BLOOD PRESSURE: 141 MMHG

## 2024-01-23 DIAGNOSIS — R42 DIZZINESS: ICD-10-CM

## 2024-01-23 DIAGNOSIS — G43.009 MIGRAINE WITHOUT AURA AND WITHOUT STATUS MIGRAINOSUS, NOT INTRACTABLE: Primary | ICD-10-CM

## 2024-01-23 PROCEDURE — 99213 OFFICE O/P EST LOW 20 MIN: CPT | Performed by: NURSE PRACTITIONER

## 2024-01-23 RX ORDER — NORTRIPTYLINE HYDROCHLORIDE 25 MG/1
25 CAPSULE ORAL NIGHTLY
Qty: 30 CAPSULE | Refills: 3 | Status: SHIPPED | OUTPATIENT
Start: 2024-01-23

## 2024-01-23 RX ORDER — TOPIRAMATE 100 MG/1
1 CAPSULE, EXTENDED RELEASE ORAL DAILY
Qty: 30 CAPSULE | Refills: 3 | Status: SHIPPED | OUTPATIENT
Start: 2024-01-23

## 2024-01-23 RX ORDER — MECLIZINE HYDROCHLORIDE 25 MG/1
25 TABLET ORAL 3 TIMES DAILY PRN
Qty: 90 TABLET | Refills: 3 | Status: SHIPPED | OUTPATIENT
Start: 2024-01-23

## 2024-01-23 NOTE — PROGRESS NOTES
[facility] Office Note      Patient:   Silvestre Herrera  MR#:    [id number]  Account Number:                         YOB: 1985  Date of Evaluation:  1/23/2024  Time of Note:                          9:17 AM  Primary/Referring Physician:  Derian Parada MD   Consulting Physician:  Flori Angulo, DNP, APRN    FOLLOW UP    Chief Complaint   Patient presents with    Follow-up    Migraine     Pt states things are much better. Does note fatigue     Dizziness     C/o dizziness when bending over or looking down      HISTORY OF PRESENT ILLNESS    Silvestre Herrera is a 38 y.o. year old male here for follow up of headaches and dizziness. Has noted improvement with headaches since last visit. No change in characteristics of headaches. Headache described as bilateral, band like sensation over the occipital/posterior areas of his head. He has more recently noted a left sided, retro orbital pain. Pain is described as a lightening bolt through his head. He notes light/sound sensitivity, nausea with headaches. He notes intermittent dizziness with headaches. Might note intermittent \"flashing\" type visual changes but does not always correlate these with headaches, occurs more with something coming into his visual field (like a ball being thrown to him). He will note numbness in his right arm with more severe headaches. No weakness. Watching TV, bending over makes headaches worse. On Trokendi and Nortriptyline for preventative. Has Imitrex prn, beneficial. He is noting 6-7 headaches in a month. Dizziness is somewhat improved as well. Dizziness is described as room spinning around him. He does get nauseous with dizziness, vomiting noted at times. Seems like lights or looking down will trigger dizziness. Meclizine does help. Has done PT in the past, some improvement with this. Noting nightmares, seeing psychiatry. Symptoms began after a work accident in March 2020. A metal pipe hit him on the top of his head, knocked

## 2024-01-23 NOTE — PROGRESS NOTES

## 2024-07-11 ENCOUNTER — TELEPHONE (OUTPATIENT)
Dept: NEUROLOGY | Age: 39
End: 2024-07-11

## 2024-07-11 NOTE — TELEPHONE ENCOUNTER
MODE MOTA (Key: [id number])  Rx #: [id number]  Need Help? Call us at [phone]  Status  Sent to Plan today  Drug  Topiramate ER 100MG er capsules    Form  [employer] Electronic PA Form (2017 NCPDP)  Original Claim Info  75 Prior Authorization Required

## 2024-07-15 NOTE — TELEPHONE ENCOUNTER
Message has been sent to the provider on what she wants to send in for the patient. Provider was also told that patient has to be referred out due to we do not accept his insurance that is [facility].         MODE MOTA (Key: [id number])  PA Case ID #: [id number]  Rx #: [id number]  Need Help? Call us at [phone]  Outcome  Denied on July 11  Your PA request has been denied. Additional information will be provided in the denial communication. (Message 1140)  Drug  Topiramate ER 100MG er capsules    Form  [facility] Electronic PA Form (2017 NCPDP)  Original Claim Info  75 Prior Authorization Required

## 2024-07-17 ENCOUNTER — TELEPHONE (OUTPATIENT)
Dept: NEUROSURGERY | Age: 39
End: 2024-07-17

## 2024-07-17 RX ORDER — TOPIRAMATE 50 MG/1
50 TABLET, FILM COATED ORAL 2 TIMES DAILY
COMMUNITY

## 2024-07-17 NOTE — TELEPHONE ENCOUNTER
Tried to call patient and had to leave a VM letting him know he would need contact his insurance and see who was in net work so we could refer him out due to we do not accept his insurance.

## 2024-07-17 NOTE — TELEPHONE ENCOUNTER
Call was placed to Hema Herrera patients parent and informed him that patients appointment will need to be cancelled related to the patients insurance out of network. Will mail letter to this effect as well.

## 2024-07-17 NOTE — TELEPHONE ENCOUNTER
Contacted [facility] and spoke with Cynthia and canceled Trokendi XR and gave a verbal for topiramate (topamax) 50 mg 2X daily with 5 refills

## 2024-07-17 NOTE — TELEPHONE ENCOUNTER
Flori Angulo, Shannan Copeland MA; Margaret Mcnair MA  The patient can go back to Topamax immediate release 50 mg twice daily.  Can you figure out where he wants this sent to?  Figure out where he wants me to put the referral in and we will go from there, he's been work man's comp in the past.

## 2024-11-29 ENCOUNTER — HOSPITAL ENCOUNTER (OUTPATIENT)
Facility: HOSPITAL | Age: 39
Discharge: HOME | End: 2024-11-29
Attending: GENERAL PRACTICE | Admitting: GENERAL PRACTICE
Payer: COMMERCIAL

## 2024-11-29 ENCOUNTER — ANESTHESIA EVENT (OUTPATIENT)
Dept: PERIOP | Facility: HOSPITAL | Age: 39
End: 2024-11-29
Payer: COMMERCIAL

## 2024-11-29 ENCOUNTER — APPOINTMENT (OUTPATIENT)
Dept: CT IMAGING | Facility: HOSPITAL | Age: 39
End: 2024-11-29
Payer: COMMERCIAL

## 2024-11-29 ENCOUNTER — APPOINTMENT (OUTPATIENT)
Dept: ULTRASOUND IMAGING | Facility: HOSPITAL | Age: 39
End: 2024-11-29
Payer: COMMERCIAL

## 2024-11-29 ENCOUNTER — ANESTHESIA (OUTPATIENT)
Dept: PERIOP | Facility: HOSPITAL | Age: 39
End: 2024-11-29
Payer: COMMERCIAL

## 2024-11-29 VITALS
RESPIRATION RATE: 16 BRPM | DIASTOLIC BLOOD PRESSURE: 62 MMHG | BODY MASS INDEX: 25.18 KG/M2 | HEIGHT: 69 IN | HEART RATE: 68 BPM | OXYGEN SATURATION: 96 % | TEMPERATURE: 97.6 F | SYSTOLIC BLOOD PRESSURE: 110 MMHG | WEIGHT: 170 LBS

## 2024-11-29 DIAGNOSIS — R11.2 NAUSEA AND VOMITING, UNSPECIFIED VOMITING TYPE: ICD-10-CM

## 2024-11-29 DIAGNOSIS — K81.0 ACUTE CHOLECYSTITIS: ICD-10-CM

## 2024-11-29 DIAGNOSIS — R10.13 EPIGASTRIC PAIN: Primary | ICD-10-CM

## 2024-11-29 LAB
ALBUMIN SERPL-MCNC: 4.8 G/DL (ref 3.5–5.2)
ALBUMIN/GLOB SERPL: 1.7 G/DL
ALP SERPL-CCNC: 61 U/L (ref 39–117)
ALT SERPL W P-5'-P-CCNC: 33 U/L (ref 1–41)
ANION GAP SERPL CALCULATED.3IONS-SCNC: 13 MMOL/L (ref 5–15)
APTT PPP: 26.2 SECONDS (ref 24.5–36)
AST SERPL-CCNC: 24 U/L (ref 1–40)
BASOPHILS # BLD AUTO: 0.04 10*3/MM3 (ref 0–0.2)
BASOPHILS NFR BLD AUTO: 0.2 % (ref 0–1.5)
BILIRUB SERPL-MCNC: 0.3 MG/DL (ref 0–1.2)
BILIRUB UR QL STRIP: NEGATIVE
BUN SERPL-MCNC: 13 MG/DL (ref 6–20)
BUN/CREAT SERPL: 22.8 (ref 7–25)
CALCIUM SPEC-SCNC: 9.2 MG/DL (ref 8.6–10.5)
CHLORIDE SERPL-SCNC: 99 MMOL/L (ref 98–107)
CLARITY UR: CLEAR
CO2 SERPL-SCNC: 24 MMOL/L (ref 22–29)
COLOR UR: YELLOW
CREAT SERPL-MCNC: 0.57 MG/DL (ref 0.76–1.27)
DEPRECATED RDW RBC AUTO: 40.5 FL (ref 37–54)
EGFRCR SERPLBLD CKD-EPI 2021: 126.3 ML/MIN/1.73
EOSINOPHIL # BLD AUTO: 0 10*3/MM3 (ref 0–0.4)
EOSINOPHIL NFR BLD AUTO: 0 % (ref 0.3–6.2)
ERYTHROCYTE [DISTWIDTH] IN BLOOD BY AUTOMATED COUNT: 13.1 % (ref 12.3–15.4)
GLOBULIN UR ELPH-MCNC: 2.9 GM/DL
GLUCOSE SERPL-MCNC: 134 MG/DL (ref 65–99)
GLUCOSE UR STRIP-MCNC: NEGATIVE MG/DL
HCT VFR BLD AUTO: 45.7 % (ref 37.5–51)
HGB BLD-MCNC: 15.4 G/DL (ref 13–17.7)
HGB UR QL STRIP.AUTO: NEGATIVE
IMM GRANULOCYTES # BLD AUTO: 0.07 10*3/MM3 (ref 0–0.05)
IMM GRANULOCYTES NFR BLD AUTO: 0.3 % (ref 0–0.5)
INR PPP: 0.95 (ref 0.91–1.09)
KETONES UR QL STRIP: ABNORMAL
LEUKOCYTE ESTERASE UR QL STRIP.AUTO: NEGATIVE
LIPASE SERPL-CCNC: 13 U/L (ref 13–60)
LYMPHOCYTES # BLD AUTO: 1.08 10*3/MM3 (ref 0.7–3.1)
LYMPHOCYTES NFR BLD AUTO: 5.3 % (ref 19.6–45.3)
MCH RBC QN AUTO: 28.4 PG (ref 26.6–33)
MCHC RBC AUTO-ENTMCNC: 33.7 G/DL (ref 31.5–35.7)
MCV RBC AUTO: 84.2 FL (ref 79–97)
MONOCYTES # BLD AUTO: 0.67 10*3/MM3 (ref 0.1–0.9)
MONOCYTES NFR BLD AUTO: 3.3 % (ref 5–12)
NEUTROPHILS NFR BLD AUTO: 18.47 10*3/MM3 (ref 1.7–7)
NEUTROPHILS NFR BLD AUTO: 90.9 % (ref 42.7–76)
NITRITE UR QL STRIP: NEGATIVE
NRBC BLD AUTO-RTO: 0 /100 WBC (ref 0–0.2)
PH UR STRIP.AUTO: 7.5 [PH] (ref 5–8)
PLATELET # BLD AUTO: 336 10*3/MM3 (ref 140–450)
PMV BLD AUTO: 9.8 FL (ref 6–12)
POTASSIUM SERPL-SCNC: 4.5 MMOL/L (ref 3.5–5.2)
PROT SERPL-MCNC: 7.7 G/DL (ref 6–8.5)
PROT UR QL STRIP: ABNORMAL
PROTHROMBIN TIME: 13 SECONDS (ref 11.8–14.8)
RBC # BLD AUTO: 5.43 10*6/MM3 (ref 4.14–5.8)
SODIUM SERPL-SCNC: 136 MMOL/L (ref 136–145)
SP GR UR STRIP: 1.02 (ref 1–1.03)
UROBILINOGEN UR QL STRIP: ABNORMAL
WBC NRBC COR # BLD AUTO: 20.33 10*3/MM3 (ref 3.4–10.8)

## 2024-11-29 PROCEDURE — 83690 ASSAY OF LIPASE: CPT

## 2024-11-29 PROCEDURE — 25010000002 FENTANYL CITRATE (PF) 50 MCG/ML SOLUTION: Performed by: NURSE ANESTHETIST, CERTIFIED REGISTERED

## 2024-11-29 PROCEDURE — 25010000002 PIPERACILLIN SOD-TAZOBACTAM PER 1 G: Performed by: GENERAL PRACTICE

## 2024-11-29 PROCEDURE — 99285 EMERGENCY DEPT VISIT HI MDM: CPT

## 2024-11-29 PROCEDURE — 25010000002 HYDROMORPHONE 1 MG/ML SOLUTION: Performed by: NURSE ANESTHETIST, CERTIFIED REGISTERED

## 2024-11-29 PROCEDURE — 25810000003 LACTATED RINGERS PER 1000 ML: Performed by: ANESTHESIOLOGY

## 2024-11-29 PROCEDURE — 47563 LAPARO CHOLECYSTECTOMY/GRAPH: CPT | Performed by: GENERAL PRACTICE

## 2024-11-29 PROCEDURE — 25010000002 MIDAZOLAM PER 1MG: Performed by: ANESTHESIOLOGY

## 2024-11-29 PROCEDURE — 85610 PROTHROMBIN TIME: CPT

## 2024-11-29 PROCEDURE — 25010000002 MORPHINE PER 10 MG: Performed by: EMERGENCY MEDICINE

## 2024-11-29 PROCEDURE — 99223 1ST HOSP IP/OBS HIGH 75: CPT | Performed by: GENERAL PRACTICE

## 2024-11-29 PROCEDURE — 74177 CT ABD & PELVIS W/CONTRAST: CPT

## 2024-11-29 PROCEDURE — 25010000002 ONDANSETRON PER 1 MG: Performed by: NURSE ANESTHETIST, CERTIFIED REGISTERED

## 2024-11-29 PROCEDURE — 25010000002 PROPOFOL 10 MG/ML EMULSION: Performed by: NURSE ANESTHETIST, CERTIFIED REGISTERED

## 2024-11-29 PROCEDURE — 80053 COMPREHEN METABOLIC PANEL: CPT

## 2024-11-29 PROCEDURE — 76705 ECHO EXAM OF ABDOMEN: CPT

## 2024-11-29 PROCEDURE — G0378 HOSPITAL OBSERVATION PER HR: HCPCS

## 2024-11-29 PROCEDURE — 81003 URINALYSIS AUTO W/O SCOPE: CPT

## 2024-11-29 PROCEDURE — 25010000002 KETOROLAC TROMETHAMINE PER 15 MG: Performed by: NURSE ANESTHETIST, CERTIFIED REGISTERED

## 2024-11-29 PROCEDURE — 25010000002 SUGAMMADEX 200 MG/2ML SOLUTION: Performed by: NURSE ANESTHETIST, CERTIFIED REGISTERED

## 2024-11-29 PROCEDURE — 25010000002 ONDANSETRON PER 1 MG: Performed by: EMERGENCY MEDICINE

## 2024-11-29 PROCEDURE — 85730 THROMBOPLASTIN TIME PARTIAL: CPT

## 2024-11-29 PROCEDURE — 96374 THER/PROPH/DIAG INJ IV PUSH: CPT

## 2024-11-29 PROCEDURE — 25510000001 IOPAMIDOL 61 % SOLUTION

## 2024-11-29 PROCEDURE — 25010000002 INDOCYANINE GREEN 25 MG RECONSTITUTED SOLUTION: Performed by: GENERAL PRACTICE

## 2024-11-29 PROCEDURE — 25010000002 GLYCOPYRROLATE 0.4 MG/2ML SOLUTION: Performed by: NURSE ANESTHETIST, CERTIFIED REGISTERED

## 2024-11-29 PROCEDURE — 88304 TISSUE EXAM BY PATHOLOGIST: CPT | Performed by: GENERAL PRACTICE

## 2024-11-29 PROCEDURE — 25010000002 KETOROLAC TROMETHAMINE PER 15 MG

## 2024-11-29 PROCEDURE — S2900 ROBOTIC SURGICAL SYSTEM: HCPCS | Performed by: GENERAL PRACTICE

## 2024-11-29 PROCEDURE — 85025 COMPLETE CBC W/AUTO DIFF WBC: CPT

## 2024-11-29 PROCEDURE — 96375 TX/PRO/DX INJ NEW DRUG ADDON: CPT

## 2024-11-29 PROCEDURE — 25010000002 DEXAMETHASONE PER 1 MG: Performed by: NURSE ANESTHETIST, CERTIFIED REGISTERED

## 2024-11-29 DEVICE — LARGE LIGATION CLIPS 6 CLIPS/CART
Type: IMPLANTABLE DEVICE | Site: ABDOMEN | Status: FUNCTIONAL
Brand: VAS-Q-CLIP

## 2024-11-29 RX ORDER — KETOROLAC TROMETHAMINE 30 MG/ML
INJECTION, SOLUTION INTRAMUSCULAR; INTRAVENOUS AS NEEDED
Status: DISCONTINUED | OUTPATIENT
Start: 2024-11-29 | End: 2024-11-29 | Stop reason: SURG

## 2024-11-29 RX ORDER — AMOXICILLIN 250 MG
2 CAPSULE ORAL 2 TIMES DAILY
Qty: 40 TABLET | Refills: 0 | Status: SHIPPED | OUTPATIENT
Start: 2024-11-29 | End: 2024-12-09

## 2024-11-29 RX ORDER — ONDANSETRON 2 MG/ML
4 INJECTION INTRAMUSCULAR; INTRAVENOUS
Status: DISCONTINUED | OUTPATIENT
Start: 2024-11-29 | End: 2024-11-29 | Stop reason: HOSPADM

## 2024-11-29 RX ORDER — TRAMADOL HYDROCHLORIDE 50 MG/1
50 TABLET ORAL EVERY 6 HOURS PRN
Status: DISCONTINUED | OUTPATIENT
Start: 2024-11-29 | End: 2024-11-29 | Stop reason: HOSPADM

## 2024-11-29 RX ORDER — BUPIVACAINE HYDROCHLORIDE AND EPINEPHRINE 5; 5 MG/ML; UG/ML
INJECTION, SOLUTION EPIDURAL; INTRACAUDAL; PERINEURAL AS NEEDED
Status: DISCONTINUED | OUTPATIENT
Start: 2024-11-29 | End: 2024-11-29 | Stop reason: HOSPADM

## 2024-11-29 RX ORDER — NEOSTIGMINE METHYLSULFATE 5 MG/5 ML
SYRINGE (ML) INTRAVENOUS AS NEEDED
Status: DISCONTINUED | OUTPATIENT
Start: 2024-11-29 | End: 2024-11-29 | Stop reason: SURG

## 2024-11-29 RX ORDER — TRAMADOL HYDROCHLORIDE 50 MG/1
50 TABLET ORAL EVERY 6 HOURS PRN
Qty: 10 TABLET | Refills: 0 | Status: SHIPPED | OUTPATIENT
Start: 2024-11-29

## 2024-11-29 RX ORDER — IOPAMIDOL 612 MG/ML
100 INJECTION, SOLUTION INTRAVASCULAR
Status: COMPLETED | OUTPATIENT
Start: 2024-11-29 | End: 2024-11-29

## 2024-11-29 RX ORDER — SODIUM CHLORIDE 0.9 % (FLUSH) 0.9 %
3-10 SYRINGE (ML) INJECTION AS NEEDED
Status: DISCONTINUED | OUTPATIENT
Start: 2024-11-29 | End: 2024-11-29 | Stop reason: HOSPADM

## 2024-11-29 RX ORDER — INDOCYANINE GREEN AND WATER 25 MG
3.75 KIT INJECTION ONCE
Status: COMPLETED | OUTPATIENT
Start: 2024-11-29 | End: 2024-11-29

## 2024-11-29 RX ORDER — LABETALOL HYDROCHLORIDE 5 MG/ML
5 INJECTION, SOLUTION INTRAVENOUS
Status: DISCONTINUED | OUTPATIENT
Start: 2024-11-29 | End: 2024-11-29 | Stop reason: HOSPADM

## 2024-11-29 RX ORDER — IBUPROFEN 600 MG/1
600 TABLET, FILM COATED ORAL EVERY 6 HOURS SCHEDULED
Qty: 15 TABLET | Refills: 0 | Status: CANCELLED | OUTPATIENT
Start: 2024-11-29 | End: 2024-12-03

## 2024-11-29 RX ORDER — AMOXICILLIN 250 MG
2 CAPSULE ORAL 2 TIMES DAILY
Status: DISCONTINUED | OUTPATIENT
Start: 2024-11-29 | End: 2024-11-29 | Stop reason: HOSPADM

## 2024-11-29 RX ORDER — NALOXONE HCL 0.4 MG/ML
0.4 VIAL (ML) INJECTION
Status: DISCONTINUED | OUTPATIENT
Start: 2024-11-29 | End: 2024-11-29 | Stop reason: HOSPADM

## 2024-11-29 RX ORDER — SODIUM CHLORIDE 0.9 % (FLUSH) 0.9 %
10 SYRINGE (ML) INJECTION EVERY 12 HOURS SCHEDULED
Status: DISCONTINUED | OUTPATIENT
Start: 2024-11-29 | End: 2024-11-29 | Stop reason: HOSPADM

## 2024-11-29 RX ORDER — ONDANSETRON 2 MG/ML
4 INJECTION INTRAMUSCULAR; INTRAVENOUS ONCE
Status: COMPLETED | OUTPATIENT
Start: 2024-11-29 | End: 2024-11-29

## 2024-11-29 RX ORDER — FLUMAZENIL 0.1 MG/ML
0.2 INJECTION INTRAVENOUS AS NEEDED
Status: DISCONTINUED | OUTPATIENT
Start: 2024-11-29 | End: 2024-11-29 | Stop reason: HOSPADM

## 2024-11-29 RX ORDER — OXYCODONE AND ACETAMINOPHEN 10; 325 MG/1; MG/1
1 TABLET ORAL EVERY 4 HOURS PRN
Status: DISCONTINUED | OUTPATIENT
Start: 2024-11-29 | End: 2024-11-29 | Stop reason: HOSPADM

## 2024-11-29 RX ORDER — MAGNESIUM HYDROXIDE 1200 MG/15ML
LIQUID ORAL AS NEEDED
Status: DISCONTINUED | OUTPATIENT
Start: 2024-11-29 | End: 2024-11-29 | Stop reason: HOSPADM

## 2024-11-29 RX ORDER — MIDAZOLAM HYDROCHLORIDE 2 MG/2ML
2 INJECTION, SOLUTION INTRAMUSCULAR; INTRAVENOUS
Status: DISCONTINUED | OUTPATIENT
Start: 2024-11-29 | End: 2024-11-29 | Stop reason: HOSPADM

## 2024-11-29 RX ORDER — TRAMADOL HYDROCHLORIDE 50 MG/1
50 TABLET ORAL EVERY 6 HOURS PRN
Qty: 10 TABLET | Refills: 0 | Status: CANCELLED | OUTPATIENT
Start: 2024-11-29

## 2024-11-29 RX ORDER — PROPOFOL 10 MG/ML
VIAL (ML) INTRAVENOUS AS NEEDED
Status: DISCONTINUED | OUTPATIENT
Start: 2024-11-29 | End: 2024-11-29 | Stop reason: SURG

## 2024-11-29 RX ORDER — ENOXAPARIN SODIUM 100 MG/ML
40 INJECTION SUBCUTANEOUS EVERY 24 HOURS
Status: DISCONTINUED | OUTPATIENT
Start: 2024-11-29 | End: 2024-11-29 | Stop reason: HOSPADM

## 2024-11-29 RX ORDER — SODIUM CHLORIDE, SODIUM LACTATE, POTASSIUM CHLORIDE, CALCIUM CHLORIDE 600; 310; 30; 20 MG/100ML; MG/100ML; MG/100ML; MG/100ML
100 INJECTION, SOLUTION INTRAVENOUS CONTINUOUS
Status: DISCONTINUED | OUTPATIENT
Start: 2024-11-29 | End: 2024-11-29

## 2024-11-29 RX ORDER — POLYETHYLENE GLYCOL 3350 17 G/17G
17 POWDER, FOR SOLUTION ORAL DAILY PRN
Status: DISCONTINUED | OUTPATIENT
Start: 2024-11-29 | End: 2024-11-29 | Stop reason: HOSPADM

## 2024-11-29 RX ORDER — SODIUM CHLORIDE 0.9 % (FLUSH) 0.9 %
10 SYRINGE (ML) INJECTION AS NEEDED
Status: DISCONTINUED | OUTPATIENT
Start: 2024-11-29 | End: 2024-11-29 | Stop reason: HOSPADM

## 2024-11-29 RX ORDER — SODIUM CHLORIDE 0.9 % (FLUSH) 0.9 %
10 SYRINGE (ML) INJECTION AS NEEDED
Status: DISCONTINUED | OUTPATIENT
Start: 2024-11-29 | End: 2024-11-29

## 2024-11-29 RX ORDER — SODIUM CHLORIDE 9 MG/ML
40 INJECTION, SOLUTION INTRAVENOUS AS NEEDED
Status: DISCONTINUED | OUTPATIENT
Start: 2024-11-29 | End: 2024-11-29 | Stop reason: HOSPADM

## 2024-11-29 RX ORDER — ONDANSETRON 2 MG/ML
INJECTION INTRAMUSCULAR; INTRAVENOUS AS NEEDED
Status: DISCONTINUED | OUTPATIENT
Start: 2024-11-29 | End: 2024-11-29 | Stop reason: SURG

## 2024-11-29 RX ORDER — IBUPROFEN 600 MG/1
600 TABLET, FILM COATED ORAL EVERY 6 HOURS SCHEDULED
Qty: 20 TABLET | Refills: 0 | Status: SHIPPED | OUTPATIENT
Start: 2024-11-29 | End: 2024-12-04

## 2024-11-29 RX ORDER — FAMOTIDINE 10 MG/ML
20 INJECTION, SOLUTION INTRAVENOUS ONCE
Status: COMPLETED | OUTPATIENT
Start: 2024-11-29 | End: 2024-11-29

## 2024-11-29 RX ORDER — HYDROMORPHONE HYDROCHLORIDE 1 MG/ML
0.5 INJECTION, SOLUTION INTRAMUSCULAR; INTRAVENOUS; SUBCUTANEOUS
Status: DISCONTINUED | OUTPATIENT
Start: 2024-11-29 | End: 2024-11-29 | Stop reason: HOSPADM

## 2024-11-29 RX ORDER — BISACODYL 5 MG/1
5 TABLET, DELAYED RELEASE ORAL DAILY PRN
Status: DISCONTINUED | OUTPATIENT
Start: 2024-11-29 | End: 2024-11-29 | Stop reason: HOSPADM

## 2024-11-29 RX ORDER — ROCURONIUM BROMIDE 10 MG/ML
INJECTION, SOLUTION INTRAVENOUS AS NEEDED
Status: DISCONTINUED | OUTPATIENT
Start: 2024-11-29 | End: 2024-11-29 | Stop reason: SURG

## 2024-11-29 RX ORDER — DEXAMETHASONE SODIUM PHOSPHATE 4 MG/ML
INJECTION, SOLUTION INTRA-ARTICULAR; INTRALESIONAL; INTRAMUSCULAR; INTRAVENOUS; SOFT TISSUE AS NEEDED
Status: DISCONTINUED | OUTPATIENT
Start: 2024-11-29 | End: 2024-11-29 | Stop reason: SURG

## 2024-11-29 RX ORDER — FENTANYL CITRATE 50 UG/ML
INJECTION, SOLUTION INTRAMUSCULAR; INTRAVENOUS AS NEEDED
Status: DISCONTINUED | OUTPATIENT
Start: 2024-11-29 | End: 2024-11-29 | Stop reason: SURG

## 2024-11-29 RX ORDER — GLYCOPYRROLATE 0.2 MG/ML
INJECTION INTRAMUSCULAR; INTRAVENOUS AS NEEDED
Status: DISCONTINUED | OUTPATIENT
Start: 2024-11-29 | End: 2024-11-29 | Stop reason: SURG

## 2024-11-29 RX ORDER — ONDANSETRON 2 MG/ML
4 INJECTION INTRAMUSCULAR; INTRAVENOUS EVERY 6 HOURS PRN
Status: DISCONTINUED | OUTPATIENT
Start: 2024-11-29 | End: 2024-11-29 | Stop reason: HOSPADM

## 2024-11-29 RX ORDER — NALOXONE HCL 0.4 MG/ML
0.04 VIAL (ML) INJECTION AS NEEDED
Status: DISCONTINUED | OUTPATIENT
Start: 2024-11-29 | End: 2024-11-29 | Stop reason: HOSPADM

## 2024-11-29 RX ORDER — IBUPROFEN 600 MG/1
600 TABLET, FILM COATED ORAL EVERY 6 HOURS SCHEDULED
Status: DISCONTINUED | OUTPATIENT
Start: 2024-11-29 | End: 2024-11-29 | Stop reason: HOSPADM

## 2024-11-29 RX ORDER — IBUPROFEN 600 MG/1
600 TABLET, FILM COATED ORAL EVERY 6 HOURS PRN
Status: DISCONTINUED | OUTPATIENT
Start: 2024-11-29 | End: 2024-11-29 | Stop reason: HOSPADM

## 2024-11-29 RX ORDER — AMOXICILLIN 250 MG
2 CAPSULE ORAL 2 TIMES DAILY
Qty: 40 TABLET | Refills: 0 | Status: CANCELLED | OUTPATIENT
Start: 2024-11-29 | End: 2024-12-09

## 2024-11-29 RX ORDER — FENTANYL CITRATE 50 UG/ML
50 INJECTION, SOLUTION INTRAMUSCULAR; INTRAVENOUS
Status: DISCONTINUED | OUTPATIENT
Start: 2024-11-29 | End: 2024-11-29 | Stop reason: HOSPADM

## 2024-11-29 RX ORDER — ONDANSETRON 4 MG/1
4 TABLET, ORALLY DISINTEGRATING ORAL EVERY 6 HOURS PRN
Status: DISCONTINUED | OUTPATIENT
Start: 2024-11-29 | End: 2024-11-29 | Stop reason: HOSPADM

## 2024-11-29 RX ORDER — SODIUM CHLORIDE 0.9 % (FLUSH) 0.9 %
3 SYRINGE (ML) INJECTION EVERY 12 HOURS SCHEDULED
Status: DISCONTINUED | OUTPATIENT
Start: 2024-11-29 | End: 2024-11-29 | Stop reason: HOSPADM

## 2024-11-29 RX ORDER — BISACODYL 10 MG
10 SUPPOSITORY, RECTAL RECTAL DAILY PRN
Status: DISCONTINUED | OUTPATIENT
Start: 2024-11-29 | End: 2024-11-29 | Stop reason: HOSPADM

## 2024-11-29 RX ORDER — KETOROLAC TROMETHAMINE 15 MG/ML
15 INJECTION, SOLUTION INTRAMUSCULAR; INTRAVENOUS ONCE
Status: COMPLETED | OUTPATIENT
Start: 2024-11-29 | End: 2024-11-29

## 2024-11-29 RX ADMIN — SODIUM CHLORIDE, POTASSIUM CHLORIDE, SODIUM LACTATE AND CALCIUM CHLORIDE 100 ML/HR: 600; 310; 30; 20 INJECTION, SOLUTION INTRAVENOUS at 12:25

## 2024-11-29 RX ADMIN — SUGAMMADEX 200 MG: 100 INJECTION, SOLUTION INTRAVENOUS at 13:59

## 2024-11-29 RX ADMIN — ONDANSETRON 4 MG: 2 INJECTION INTRAMUSCULAR; INTRAVENOUS at 13:29

## 2024-11-29 RX ADMIN — IOPAMIDOL 100 ML: 612 INJECTION, SOLUTION INTRAVENOUS at 09:05

## 2024-11-29 RX ADMIN — IBUPROFEN 600 MG: 600 TABLET, FILM COATED ORAL at 18:12

## 2024-11-29 RX ADMIN — TRAMADOL HYDROCHLORIDE 50 MG: 50 TABLET, COATED ORAL at 18:12

## 2024-11-29 RX ADMIN — INDOCYANINE GREEN AND WATER 3.75 MG: KIT at 12:18

## 2024-11-29 RX ADMIN — DEXAMETHASONE SODIUM PHOSPHATE 4 MG: 4 INJECTION INTRA-ARTICULAR; INTRALESIONAL; INTRAMUSCULAR; INTRAVENOUS; SOFT TISSUE at 13:29

## 2024-11-29 RX ADMIN — KETOROLAC TROMETHAMINE 15 MG: 30 INJECTION, SOLUTION INTRAMUSCULAR; INTRAVENOUS at 13:29

## 2024-11-29 RX ADMIN — Medication 3.5 MG: at 13:49

## 2024-11-29 RX ADMIN — MIDAZOLAM HYDROCHLORIDE 2 MG: 1 INJECTION, SOLUTION INTRAMUSCULAR; INTRAVENOUS at 12:29

## 2024-11-29 RX ADMIN — FENTANYL CITRATE 100 MCG: 50 INJECTION, SOLUTION INTRAMUSCULAR; INTRAVENOUS at 12:51

## 2024-11-29 RX ADMIN — PIPERACILLIN SODIUM,TAZOBACTAM SODIUM 3.38 G: 3; .375 INJECTION, POWDER, FOR SOLUTION INTRAVENOUS at 12:47

## 2024-11-29 RX ADMIN — ONDANSETRON 4 MG: 2 INJECTION INTRAMUSCULAR; INTRAVENOUS at 09:57

## 2024-11-29 RX ADMIN — MORPHINE SULFATE 4 MG: 4 INJECTION, SOLUTION INTRAMUSCULAR; INTRAVENOUS at 09:57

## 2024-11-29 RX ADMIN — FAMOTIDINE 20 MG: 10 INJECTION INTRAVENOUS at 09:12

## 2024-11-29 RX ADMIN — FENTANYL CITRATE 150 MCG: 50 INJECTION, SOLUTION INTRAMUSCULAR; INTRAVENOUS at 12:36

## 2024-11-29 RX ADMIN — PROPOFOL 200 MG: 10 INJECTION, EMULSION INTRAVENOUS at 12:40

## 2024-11-29 RX ADMIN — GLYCOPYRROLATE 0.4 MG: 0.2 INJECTION INTRAMUSCULAR; INTRAVENOUS at 13:49

## 2024-11-29 RX ADMIN — ROCURONIUM BROMIDE 50 MG: 10 INJECTION, SOLUTION INTRAVENOUS at 12:40

## 2024-11-29 RX ADMIN — HYDROMORPHONE HYDROCHLORIDE 1 MG: 1 INJECTION, SOLUTION INTRAMUSCULAR; INTRAVENOUS; SUBCUTANEOUS at 14:08

## 2024-11-29 RX ADMIN — KETOROLAC TROMETHAMINE 15 MG: 15 INJECTION, SOLUTION INTRAMUSCULAR; INTRAVENOUS at 09:12

## 2024-11-29 NOTE — LETTER
November 29, 2024     Patient: London Calabrese   YOB: 1985   Date of Visit: 11/29/2024       To Whom It May Concern:    It is my medical opinion that London Calabrese may return to light duty immediately with the following restrictions: no heavy lifting heavier than 10lbs, for 6 weeks .           Sincerely,        Samia Murray RN

## 2024-11-29 NOTE — ANESTHESIA PROCEDURE NOTES
Airway  Urgency: elective    Date/Time: 11/29/2024 12:44 PM  Airway not difficult    General Information and Staff    Patient location during procedure: OR  CRNA/CAA: Bony Anderson CRNA    Indications and Patient Condition  Indications for airway management: airway protection    Preoxygenated: yes  MILS maintained throughout  Mask difficulty assessment: 1 - vent by mask    Final Airway Details  Final airway type: endotracheal airway      Successful airway: ETT  Cuffed: yes   Successful intubation technique: direct laryngoscopy  Endotracheal tube insertion site: oral  Blade: Khoury  Blade size: 2  ETT size (mm): 7.5  Cormack-Lehane Classification: grade I - full view of glottis  Placement verified by: chest auscultation and capnometry   Cuff volume (mL): 5  Measured from: gums  ETT/EBT to gums (cm): 21  Number of attempts at approach: 1  Assessment: lips, teeth, and gum same as pre-op and atraumatic intubation

## 2024-11-29 NOTE — ED PROVIDER NOTES
"Subjective   History of Present Illness  Patient is a 39-year-old male that presents to the Emergency Department for complaints of abdominal pain.  Patient reports he believes he drank spoiled milk last night and following this he has been vomiting since 10 PM.  Patient reports nausea and vomiting has now subsided but he is now having a pain localized to the periumbilical/epigastric area of the abdomen.  Patient reports area is tender to touch and he feels like there is a \"knot\" in this area.  Patient denies any chest pain or shortness of breath.  Denies any lightheadedness, dizziness, blurred vision, headache or near syncope.  Patient denies any urinary or neurologic complaints or changes.  Denies any fevers, body aches, chills, cough or congestion.  Denies any diarrhea.   Patient reports he has not had anything to eat or drink since 9:30 PM last night.        Review of Systems   Gastrointestinal:  Positive for abdominal pain, nausea and vomiting.   All other systems reviewed and are negative.      Past Medical History:   Diagnosis Date    Brain concussion     work injury in March 2020    Headache     Neck pain     from the work injury in March 2020       No Known Allergies    Past Surgical History:   Procedure Laterality Date    NO PAST SURGERIES         History reviewed. No pertinent family history.    Social History     Socioeconomic History    Marital status: Single   Tobacco Use    Smoking status: Every Day     Current packs/day: 0.50     Types: Cigarettes    Smokeless tobacco: Never   Substance and Sexual Activity    Alcohol use: Not Currently    Drug use: Not Currently    Sexual activity: Defer           Objective   Physical Exam  Vitals and nursing note reviewed.   Constitutional:       Appearance: Normal appearance.      Comments: Nontoxic appearing. In no acute distress.    HENT:      Head: Normocephalic and atraumatic.      Right Ear: External ear normal.      Left Ear: External ear normal.      Nose: " Nose normal.      Mouth/Throat:      Mouth: Mucous membranes are moist.      Pharynx: Oropharynx is clear.   Eyes:      Extraocular Movements: Extraocular movements intact.      Conjunctiva/sclera: Conjunctivae normal.      Pupils: Pupils are equal, round, and reactive to light.   Cardiovascular:      Rate and Rhythm: Normal rate and regular rhythm.      Pulses: Normal pulses.      Heart sounds: Normal heart sounds.   Pulmonary:      Effort: Pulmonary effort is normal. No respiratory distress.      Breath sounds: Normal breath sounds. No wheezing.   Chest:      Chest wall: No tenderness.   Abdominal:      General: Bowel sounds are normal. There is no distension.      Palpations: Abdomen is soft.      Tenderness: There is generalized abdominal tenderness and tenderness in the epigastric area. There is no right CVA tenderness, left CVA tenderness, guarding or rebound.   Musculoskeletal:         General: Normal range of motion.      Cervical back: Normal range of motion and neck supple.      Right lower leg: No edema.      Left lower leg: No edema.   Skin:     General: Skin is warm and dry.      Capillary Refill: Capillary refill takes less than 2 seconds.   Neurological:      General: No focal deficit present.      Mental Status: He is alert and oriented to person, place, and time. Mental status is at baseline.   Psychiatric:         Mood and Affect: Mood normal.         Behavior: Behavior normal.         Thought Content: Thought content normal.         Judgment: Judgment normal.       Labs Reviewed   COMPREHENSIVE METABOLIC PANEL - Abnormal; Notable for the following components:       Result Value    Glucose 134 (*)     Creatinine 0.57 (*)     All other components within normal limits    Narrative:     GFR Normal >60  Chronic Kidney Disease <60  Kidney Failure <15     URINALYSIS W/ CULTURE IF INDICATED - Abnormal; Notable for the following components:    Ketones, UA 15 mg/dL (1+) (*)     Protein, UA Trace (*)     All  other components within normal limits    Narrative:     In absence of clinical symptoms, the presence of pyuria, bacteria, and/or nitrites on the urinalysis result does not correlate with infection.  Urine microscopic not indicated.   CBC WITH AUTO DIFFERENTIAL - Abnormal; Notable for the following components:    WBC 20.33 (*)     Neutrophil % 90.9 (*)     Lymphocyte % 5.3 (*)     Monocyte % 3.3 (*)     Eosinophil % 0.0 (*)     Neutrophils, Absolute 18.47 (*)     Immature Grans, Absolute 0.07 (*)     All other components within normal limits   PROTIME-INR - Normal   LIPASE - Normal   APTT - Normal   CBC AND DIFFERENTIAL    Narrative:     The following orders were created for panel order CBC & Differential.  Procedure                               Abnormality         Status                     ---------                               -----------         ------                     CBC Auto Differential[[id number]]        Abnormal            Final result                 Please view results for these tests on the individual orders.      CT Abdomen Pelvis With Contrast   Final Result       1.  Distended mildly thick-walled gallbladder with adjacent stranding   and small volume fluid. Findings are in keeping with acute cholecystitis   in the appropriate clinical setting. No gallstone visualized by CT.       2.  No bowel distention or evidence of active bowel inflammation. Normal   appendix.           This report was signed and finalized on 11/29/2024 9:15 AM by Dr. Haroon Ramachandran MD.          US Gallbladder    (Results Pending)        Procedures           ED Course  ED Course as of 11/29/24 1048   Fri Nov 29, 2024   0331 General surgery paged at this time [KF]      ED Course User Index  [KF] Susan Salamanca, APRN                                                       Medical Decision Making  London CRAFT Guanakito is a 39 y.o. male who presents to the ED for complaints of abdominal pain.  Patient reports he believes he drank  "spoiled milk last night and following this he has been vomiting since 10 PM.  Patient reports nausea and vomiting has now subsided but he is now having a pain localized to the periumbilical/epigastric area of the abdomen.  Patient reports area is tender to touch and he feels like there is a \"knot\" in this area.  Patient denies any chest pain or shortness of breath.  Denies any lightheadedness, dizziness, blurred vision, headache or near syncope.  Patient denies any urinary or neurologic complaints or changes.  Denies any fevers, body aches, chills, cough or congestion.  Denies any diarrhea.  Patient reports he has not had anything to eat or drink since 9:30 PM last night.    Patient was non-toxic appearing on arrival. No acute distress was noted.  Vital signs stable.    Patient's presentation raises suspicion for differentials including, but not limited to, electrolyte balance, dehydration, bowel obstruction, perforation, hernia, incarceration, cholecystitis appendicitis.    Past medical history, surgical history, and medication regimen reviewed.     Previous notes, labs, imaging, and more reviewed.    Medications administered,   sodium chloride 0.9 % flush 10 mL (has no administration in time range)  famotidine (PEPCID) injection 20 mg (20 mg Intravenous Given 11/29/24 0912)  ketorolac (TORADOL) injection 15 mg (15 mg Intravenous Given 11/29/24 0912)  iopamidol (ISOVUE-300) 61 % injection 100 mL (100 mL Intravenous Given 11/29/24 0905)  ondansetron (ZOFRAN) injection 4 mg (4 mg Intravenous Given 11/29/24 0957)  morphine injection 4 mg (4 mg Intravenous Given 11/29/24 0957)     Please refer to above section of note for lab and imaging results that were reviewed and interpreted by radiology as well as attending physician.     Given findings described above, patient's presentation is likely consistent with acute cholecystitis.    Spoke with Dr. Cosme, general surgeon on-call regarding CT findings.  He recommended " obtaining gallbladder ultrasound and admitting patient to his service.    I reassessed the patient and discussed the findings of the work up so far with everyone in the room. I said that the next step in treatment would be admission to the hospital for further workup and care. I also said that there is always some diagnostic uncertainty in the ER, that symptoms may change, and new things may be found after being admitted. The general surgery service assumed primary care of the patient and the patient was admitted to their service in stable condition.    Dragon disclaimer:  Parts of this note may be an electronic transcription/translation of spoken language to printed text using the Dragon dictation system.       Problems Addressed:  Acute cholecystitis: complicated acute illness or injury  Epigastric pain: complicated acute illness or injury  Nausea and vomiting, unspecified vomiting type: complicated acute illness or injury    Amount and/or Complexity of Data Reviewed  Labs: ordered.  Radiology: ordered.    Risk  Prescription drug management.  Decision regarding hospitalization.        Final diagnoses:   Epigastric pain   Nausea and vomiting, unspecified vomiting type   Acute cholecystitis       ED Disposition  ED Disposition       ED Disposition   Decision to Admit    Condition   --    Comment   Level of Care: Med/Surg [1]   Diagnosis: Acute cholecystitis [575.0.ICD-9-CM]   Admitting Physician: RADHA BUSH [[id number]]   Attending Physician: RADHA BUSH [[id number]]                 No follow-up provider specified.       Medication List      No changes were made to your prescriptions during this visit.            Susan Salamanca, APRN  11/29/24 1048

## 2024-11-29 NOTE — DISCHARGE INSTRUCTIONS
No heavy lifting for 6 weeks (nothing over 10 pounds).  No submerging your incisions for 2 weeks (no hot tubs, pools, baths).  Okay to shower, let soapy water run over your incisions and pat dry.  Your incisions are covered with skin glue.  This will fall off on its own in 2 weeks.    We would like to see you in clinic for follow-up in approximately 2 weeks.  Please call general surgery clinic with any questions or concerns.    If you experience any fevers, chills, nausea/vomiting, redness around the incision, or pain that is getting worse with time.  Please call the clinic or present to the emergency department if after clinic hours.

## 2024-11-29 NOTE — DISCHARGE SUMMARY
Date of Discharge:  2024    Discharge Diagnosis: Acute cholecystitis    Presenting Problem/History of Present Illness    Mr. Calabrese is a 39 y.o. with no significant past medical history who presents with less than 24 hours of right upper quadrant pain.  Patient states pain began yesterday after he drank milk that he thought was .  He has had multiple episodes of emesis since that time.  He is also having localized pain to the Gar[place] umbilical region as well as right upper quadrant.  He denies chest pain, shortness of breath, dizziness, pain with urination, constipation, diarrhea, pain with urination.     He underwent CT imaging in the emergency department which showed Distended thickened gallbladder wall with adjacent stranding and small volume fluid concerning for acute cholecystitis.  Labs showed leukocytosis otherwise normal.  Right upper quadrant ultrasound showed cholelithiasis.  General surgery was consulted.    Hospital Course  Patient underwent robot assisted cholecystectomy on day of presentation.  The surgery was uneventful and he recovered on the floor without issue.  At time of discharge she was tolerating regular diet, voiding spontaneously, pain controlled with oral pain medications.  Ambulating at baseline.    Procedures Performed  Procedure(s):  CHOLECYSTECTOMY LAPAROSCOPIC WITH DAVINCI ROBOT       Consults:   Consults       No orders found from 10/31/2024 to 2024.            Pertinent Test Results:   Lab Results (last 24 hours)       Procedure Component Value Units Date/Time    Comprehensive Metabolic Panel [[id number]]  (Abnormal) Collected: 24 0857    Specimen: Blood Updated: 24     Glucose 134 mg/dL      BUN 13 mg/dL      Creatinine 0.57 mg/dL      Sodium 136 mmol/L      Potassium 4.5 mmol/L      Comment: Slight hemolysis detected by analyzer. Result may be falsely elevated.        Chloride 99 mmol/L      CO2 24.0 mmol/L      Calcium 9.2 mg/dL      Total Protein  7.7 g/dL      Albumin 4.8 g/dL      ALT (SGPT) 33 U/L      AST (SGOT) 24 U/L      Comment: Slight hemolysis detected by analyzer. Result may be falsely elevated.        Alkaline Phosphatase 61 U/L      Total Bilirubin 0.3 mg/dL      Globulin 2.9 gm/dL      A/G Ratio 1.7 g/dL      BUN/Creatinine Ratio 22.8     Anion Gap 13.0 mmol/L      eGFR 126.3 mL/min/1.73     Narrative:      GFR Normal >60  Chronic Kidney Disease <60  Kidney Failure <15      Urinalysis With Culture If Indicated - Urine, Clean Catch [[id number]]  (Abnormal) Collected: 11/29/24 0910    Specimen: Urine, Clean Catch Updated: 11/29/24 0922     Color, UA Yellow     Appearance, UA Clear     pH, UA 7.5     Specific Gravity, UA 1.025     Glucose, UA Negative     Ketones, UA 15 mg/dL (1+)     Bilirubin, UA Negative     Blood, UA Negative     Protein, UA Trace     Leuk Esterase, UA Negative     Nitrite, UA Negative     Urobilinogen, UA 0.2 E.U./dL    Narrative:      In absence of clinical symptoms, the presence of pyuria, bacteria, and/or nitrites on the urinalysis result does not correlate with infection.  Urine microscopic not indicated.    Lipase [[id number]]  (Normal) Collected: 11/29/24 0857    Specimen: Blood Updated: 11/29/24 0918     Lipase 13 U/L     Protime-INR [[id number]]  (Normal) Collected: 11/29/24 0857    Specimen: Blood Updated: 11/29/24 0914     Protime 13.0 Seconds      INR 0.95    aPTT [[id number]]  (Normal) Collected: 11/29/24 0857    Specimen: Blood Updated: 11/29/24 0914     PTT 26.2 seconds     CBC & Differential [[id number]]  (Abnormal) Collected: 11/29/24 0857    Specimen: Blood Updated: 11/29/24 0905    Narrative:      The following orders were created for panel order CBC & Differential.  Procedure                               Abnormality         Status                     ---------                               -----------         ------                     CBC Auto Differential[[id number]]        Abnormal            Final result                  Please view results for these tests on the individual orders.    CBC Auto Differential [[id number]]  (Abnormal) Collected: 11/29/24 0857    Specimen: Blood Updated: 11/29/24 0905     WBC 20.33 10*3/mm3      RBC 5.43 10*6/mm3      Hemoglobin 15.4 g/dL      Hematocrit 45.7 %      MCV 84.2 fL      MCH 28.4 pg      MCHC 33.7 g/dL      RDW 13.1 %      RDW-SD 40.5 fl      MPV 9.8 fL      Platelets 336 10*3/mm3      Neutrophil % 90.9 %      Lymphocyte % 5.3 %      Monocyte % 3.3 %      Eosinophil % 0.0 %      Basophil % 0.2 %      Immature Grans % 0.3 %      Neutrophils, Absolute 18.47 10*3/mm3      Lymphocytes, Absolute 1.08 10*3/mm3      Monocytes, Absolute 0.67 10*3/mm3      Eosinophils, Absolute 0.00 10*3/mm3      Basophils, Absolute 0.04 10*3/mm3      Immature Grans, Absolute 0.07 10*3/mm3      nRBC 0.0 /100 WBC               Condition on Discharge: Stable      Discharge Disposition: Home      Discharge Medications     Discharge Medications        New Medications        Instructions Start Date   ibuprofen 600 MG tablet  Commonly known as: ADVIL,MOTRIN   600 mg, Oral, Every 6 Hours Scheduled      naloxone 4 MG/0.1ML nasal spray  Commonly known as: NARCAN   Call 911. Don't prime. [place] in 1 nostril for overdose. Repeat in 2-3 minutes in other nostril if no or minimal breathing/responsiveness.      sennosides-docusate 8.6-50 MG per tablet  Commonly known as: PERICOLACE   2 tablets, Oral, 2 Times Daily      traMADol 50 MG tablet  Commonly known as: ULTRAM   50 mg, Oral, Every 6 Hours PRN             Stop These Medications      meclizine 25 MG chewable tablet chewable tablet     oxyCODONE-acetaminophen 7.5-325 MG per tablet  Commonly known as: PERCOCET     Trokendi  MG capsule sustained-release 24 hr  Generic drug: Topiramate ER              Discharge Diet: Regular    Activity at Discharge: No heavy lifting, nothing over 10 pounds    Follow-up Appointments  No future appointments.      Test Results Pending  at Discharge  Pending Labs       Order Current Status    Tissue Pathology Exam Collected (11/29/24 0444)             Evan Cosme MD  11/29/24  17:34 CST    Time: Time spent at discharge 30 minutes     Part of this note may be an electronic transcription/translation of spoken language to printed text using the Dragon Dictation System.

## 2024-11-29 NOTE — PLAN OF CARE
Goal Outcome Evaluation:  Plan of Care Reviewed With: patient        Progress: improving  Outcome Evaluation: Pt A/Ox4, vitals stable. s/p lap ally by Dr. Cosme, x4 lap sites taylor, DANILO CDI. voiding. no c/o pain or nausea, tolerating regular diet. discharge instructions given and reviewed with patient, he verbalized understanding. pt is ready to discharge home.

## 2024-11-29 NOTE — ED NOTES
"Nursing report ED to floor  London Calabrese  39 y.o.  male    HPI:   Chief Complaint   Patient presents with    Vomiting     Pt reports feeling great all day yesterday, but \"accidentally drank a bunch of spoiled milk\" last night. Reports vomitting x 5 episodes throughout the night. Reports ab pain now.    Abdominal Pain       Admitting doctor:   Evan Cosme MD    Consulting provider(s):  Consults       No orders found from 10/31/2024 to 11/30/2024.             Admitting diagnosis:   The primary encounter diagnosis was Epigastric pain. Diagnoses of Nausea and vomiting, unspecified vomiting type and Acute cholecystitis were also pertinent to this visit.    Code status:   Current Code Status       Date Active Code Status Order ID Comments User Context       Not on file            Allergies:   Patient has no known allergies.    Intake and Output  No intake or output data in the 24 hours ending 11/29/24 1007    Weight:       11/29/24  0807   Weight: 77.1 kg (170 lb)       Most recent vitals:   Vitals:    11/29/24 0807 11/29/24 0900 11/29/24 0915 11/29/24 0930   BP: 168/96 147/85 166/97 155/97   Pulse: 66  64 66   Resp: 16      Temp: 97.7 °F (36.5 °C)      SpO2: 100%  100% 98%   Weight: 77.1 kg (170 lb)      Height: 175.3 cm (69\")        Oxygen Therapy: .    Active LDAs/IV Access:   Lines, Drains & Airways       Active LDAs       Name Placement date Placement time Site Days    Peripheral IV --  --  --  --    Peripheral IV 11/29/24 0857 Right Antecubital 11/29/24  0857  Antecubital  less than 1                    Labs (abnormal labs have a star):   Labs Reviewed   COMPREHENSIVE METABOLIC PANEL - Abnormal; Notable for the following components:       Result Value    Glucose 134 (*)     Creatinine 0.57 (*)     All other components within normal limits    Narrative:     GFR Normal >60  Chronic Kidney Disease <60  Kidney Failure <15     URINALYSIS W/ CULTURE IF INDICATED - Abnormal; Notable for the following components:    " Ketones, UA 15 mg/dL (1+) (*)     Protein, UA Trace (*)     All other components within normal limits    Narrative:     In absence of clinical symptoms, the presence of pyuria, bacteria, and/or nitrites on the urinalysis result does not correlate with infection.  Urine microscopic not indicated.   CBC WITH AUTO DIFFERENTIAL - Abnormal; Notable for the following components:    WBC 20.33 (*)     Neutrophil % 90.9 (*)     Lymphocyte % 5.3 (*)     Monocyte % 3.3 (*)     Eosinophil % 0.0 (*)     Neutrophils, Absolute 18.47 (*)     Immature Grans, Absolute 0.07 (*)     All other components within normal limits   PROTIME-INR - Normal   LIPASE - Normal   APTT - Normal   CBC AND DIFFERENTIAL    Narrative:     The following orders were created for panel order CBC & Differential.  Procedure                               Abnormality         Status                     ---------                               -----------         ------                     CBC Auto Differential[[id number]]        Abnormal            Final result                 Please view results for these tests on the individual orders.       Meds given in ED:   Medications   sodium chloride 0.9 % flush 10 mL (has no administration in time range)   famotidine (PEPCID) injection 20 mg (20 mg Intravenous Given 11/29/24 0912)   ketorolac (TORADOL) injection 15 mg (15 mg Intravenous Given 11/29/24 0912)   iopamidol (ISOVUE-300) 61 % injection 100 mL (100 mL Intravenous Given 11/29/24 0905)   ondansetron (ZOFRAN) injection 4 mg (4 mg Intravenous Given 11/29/24 0957)   morphine injection 4 mg (4 mg Intravenous Given 11/29/24 0957)           NIH Stroke Scale:       Isolation/Infection(s):  No active isolations   No active infections     COVID Testing  Collected .  Resulted .    Nursing report ED to floor:  Mental status: .axox4  Ambulatory status: .ad mukund  Precautions: .none    ED nurse phone extentsion- .. [phone]  Report given to TYREE Gracia

## 2024-11-29 NOTE — H&P
" Patient: London Calabrese    YOB: 1985    Date: 2024    Primary Care Provider: Provider, No Known    Chief Complaint   Patient presents with    Vomiting     Pt reports feeling great all day yesterday, but \"accidentally drank a bunch of spoiled milk\" last night. Reports vomitting x 5 episodes throughout the night. Reports ab pain now.    Abdominal Pain       History of present illness:  Mr. Calabrese is a 39 y.o. with no significant past medical history who presents with less than 24 hours of right upper quadrant pain.  Patient states pain began yesterday after he drank milk that he thought was .  He has had multiple episodes of emesis since that time.  He is also having localized pain to the Garo umbilical region as well as right upper quadrant.  He denies chest pain, shortness of breath, dizziness, pain with urination, constipation, diarrhea, pain with urination.    He underwent CT imaging in the emergency department which showed Distended thickened gallbladder wall with adjacent stranding and small volume fluid concerning for acute cholecystitis.  Labs showed leukocytosis otherwise normal.  Right upper quadrant ultrasound showed cholelithiasis.  General surgery was consulted.    Review of Systems  14 point review of systems negative except for above findings    History:  Past Medical History:   Diagnosis Date    Brain concussion     work injury in 2020    Headache     Neck pain     from the work injury in 2020          Past Surgical History:   Procedure Laterality Date    NO PAST SURGERIES         History reviewed. No pertinent family history.    Social History     Tobacco Use    Smoking status: Former     Current packs/day: 0.00     Types: Cigarettes     Quit date: 2004     Years since quittin.9    Smokeless tobacco: Never   Vaping Use    Vaping status: Never Used   Substance Use Topics    Alcohol use: Not Currently    Drug use: Not Currently       Allergies:  No Known " Allergies    Medications:     Current Facility-Administered Medications:     lactated ringers infusion, 100 mL/hr, Intravenous, Continuous, Torri Salazar MD, Last Rate: 100 mL/hr at 11/29/24 1225, 100 mL/hr at 11/29/24 1225    Midazolam HCl (PF) (VERSED) injection 2 mg, 2 mg, Intravenous, Q10 Min PRN, Torri Salazar MD, 2 mg at 11/29/24 1229    piperacillin-tazobactam (ZOSYN) 3.375 g IVPB in 100 mL NS MBP (CD), 3.375 g, Intravenous, Once, Evan Cosme MD    piperacillin-tazobactam (ZOSYN) 3.375 g IVPB in 100 mL NS MBP (CD), 3.375 g, Intravenous, Q8H, Evan Cosme MD    [COMPLETED] Insert Peripheral IV, , , Once **AND** [Transfer Hold] sodium chloride 0.9 % flush 10 mL, 10 mL, Intravenous, PRN, Susan Salamanca, APRN    sodium chloride 0.9 % flush 3 mL, 3 mL, Intravenous, Q12H, Torri Salazar MD    sodium chloride 0.9 % flush 3-10 mL, 3-10 mL, Intravenous, PRN, Torri Salazar MD    sodium chloride 0.9 % infusion 40 mL, 40 mL, Intravenous, PRN, Torri Salazar MD    Vital Signs:   Vitals:    11/29/24 0915 11/29/24 0930 11/29/24 1100 11/29/24 1218   BP: 166/97 155/97 158/85    BP Location:   Left arm    Patient Position:   Lying    Pulse: 64 66 67 63   Resp:   16 16   Temp:   99 °F (37.2 °C)    TempSrc:   Oral    SpO2: 100% 98% 100% 99%   Weight:       Height:           Physical Exam:     General Appearance:    Alert, cooperative, in no acute distress   Head:    Normocephalic, without obvious abnormality, atraumatic   Eyes:          PERRLA, EOMI   Ears:    Ears appear intact with no abnormalities noted   Throat:   No oral lesions, oral mucosa moist   Neck:   No adenopathy, supple, trachea midline   Back:     No skin lesions, erythema or scars, no tenderness palpation   Lungs:     B/L chest rise, no increase work of breathing     Heart:    Regular rhythm and normal rate   Chest Wall:    No abnormalities observed, no skin lesions   Abdomen:     Soft, ND, tenderness to  palpation right upper quadrant.  Positive Valdez sign.   Rectal:     Deferred   Extremities:   Moves all extremities well, no edema   Pulses:   Pulses palpable and equal bilaterally   Skin:   No bleeding, bruising or rash   Lymph nodes:   No palpable adenopathy   Neurologic:   Cranial nerves 2 - 12 grossly intact, sensation intact       Results Review:     Results from last 7 days   Lab Units 11/29/24  0857   WBC 10*3/mm3 20.33*   HEMOGLOBIN g/dL 15.4   HEMATOCRIT % 45.7   PLATELETS 10*3/mm3 336        Results from last 7 days   Lab Units 11/29/24  0857   SODIUM mmol/L 136   POTASSIUM mmol/L 4.5   CHLORIDE mmol/L 99   CO2 mmol/L 24.0   BUN mg/dL 13   CREATININE mg/dL 0.57*   CALCIUM mg/dL 9.2   BILIRUBIN mg/dL 0.3   ALK PHOS U/L 61   ALT (SGPT) U/L 33   AST (SGOT) U/L 24   GLUCOSE mg/dL 134*     CT abdomen pelvis and right upper quadrant ultrasound reviewed.    Assessment / Plan:    Mr. Calabrese is a 39 y.o. with no significant past medical history who presents with labs, imaging, physical exam and history consistent with acute cholecystitis.  Leukocytosis of 20.33, CT with thickened gallbladder wall and pericholecystic fluid.  LFTs within normal limits, no common bile duct dilation.    I had a detailed and extensive discussion with the patient about the diagnosis and reviewed his recent workup.  I recommended the patient undergo robot assisted cholecystectomy.    Risk, benefits, alternatives of the procedure were discussed with the patient.  Risk include bleeding, infection, damage to surrounding structures (bowel, liver, CBD), poor cosmesis, continued/chronic pain, and need for further operation. The patient stated understanding.  Will plan to proceed with robot-assisted cholecystectomy.    Electronically signed by Evan Cosme MD  11/29/24  12:31 CST

## 2024-11-29 NOTE — OP NOTE
CHOLECYSTECTOMY LAPAROSCOPIC WITH DAVINCI ROBOT  Procedure Note    London Calabrese  11/29/2024    Pre-op Diagnosis:   Acute cholecystitis    Post-op Diagnosis:     Acute cholecystitis    Procedure/CPT® Codes:      Procedure(s):  CHOLECYSTECTOMY LAPAROSCOPIC WITH DAVINCI ROBOT    Surgeon(s):  Evan Cosme MD        Anesthesia: General    Staff:   Specimens       ID Source Type Tests Collected By Collected At Frozen?    A Gallbladder Tissue TISSUE PATHOLOGY EXAM   Evan Cosme MD 11/29/24 1256 No    Description: Gallbladder and Contents            Estimated Blood Loss: minimal    Specimens:                ID Type Source Tests Collected by Time   A : Gallbladder and Contents Tissue Gallbladder TISSUE PATHOLOGY EXAM Evan Cosme MD 11/29/2024 1256         Drains: * No LDAs found *    Findings: Evidence of acute cholecystitis.  Cystic duct and cystic artery triply clipped.  Critical view of safety obtained.  Small amount of bile spillage    Complications: None    Procedure: Prior to the operation the patient was met in the preoperative holding area where he was again explained risk benefits and alternatives of the procedure.  The patient stated his understanding and consent and laterality were signed.  The patient was then met by the anesthesia care team and taken to the operating room where he was placed supine on the operating table with proper padding of all extremities.  He underwent general endotracheal anesthesia.  He was prepped and draped in usual sterile fashion.  A timeout was performed to confirm correct patient, procedure, operating site.    The abdomen was entered with Veress needle at Vasquez's point.  The abdomen was insufflated to 15 mmHg.  Opening pressures were less than 8.  The abdomen was then entered via Optiview technique.  Once in the abdomen 3 additional trocar sites were placed 2 in the left hemiabdomen 1 in the right hemiabdomen.  The patient was placed in reverse Trendelenburg position with  right side up.  The da Jackie robot was docked per protocol.    The fundus of the gallbladder was grasped and elevated towards the right shoulder.  There was some adhesions and overlying omentum that had to be divided and retracted.  The gallbladder was inflamed and enlarged.  A small hole was made and bile was drained out of it.  The infundibulum was then grasped and pulled to the left lower quadrant.  The cystic duct was identified and circumferentially dissected.  The cystic artery was identified and circumferentially dissected.  The lower third of the gallbladder was dissected free from all fibrofatty tissue and only 2 structures were identified entering the gallbladder with liver clearly visible behind.  Once a critical view of safety was obtained the cystic duct and cystic artery were triply clipped.    The gallbladder was then divided from the gallbladder fossa using electrocautery.  He was placed into an Endo Catch bag.  Hemostasis was obtained.  The left upper quadrant was irrigated copiously until clear fluid was returned.  The gallbladder was then removed from the left middle port.  The port site was then closed using an 0 Vicryl suture in a figure-of-eight fashion.  The remainder the ports were removed and the abdomen was allowed to desufflate.  The incision sites were closed in a cosmetic fashion using a 4-0 Monocryl suture.  Dermabond was placed over top.  The patient awoke from general anesthesia in stable condition.  There were no immediate complications.  Sponge needle instrument counts were correct at the conclusion of the case.        Evan Cosme MD     Date: 11/29/2024  Time: 17:20 CST    Part of this note may be an electronic transcription/translation of spoken language to printed text using the Dragon Dictation System.

## 2024-11-29 NOTE — ANESTHESIA PREPROCEDURE EVALUATION
Anesthesia Evaluation     Patient summary reviewed   no history of anesthetic complications:   NPO Solid Status: > 8 hours  NPO Liquid Status: > 8 hours           Airway   Mallampati: I  TM distance: >3 FB  Neck ROM: full  No difficulty expected  Dental      Pulmonary    (+) a smoker Current,  (-) asthma, sleep apnea  Cardiovascular - negative cardio ROS  Exercise tolerance: excellent (>7 METS)        Neuro/Psych  (-) seizures, TIA, CVA  GI/Hepatic/Renal/Endo - negative ROS     Musculoskeletal (-) negative ROS    Abdominal    Substance History      OB/GYN          Other                    Anesthesia Plan    ASA 2 - emergent     general     intravenous induction     Anesthetic plan, risks, benefits, and alternatives have been provided, discussed and informed consent has been obtained with: patient.    CODE STATUS:

## 2024-12-02 ENCOUNTER — TELEPHONE (OUTPATIENT)
Dept: SURGERY | Facility: CLINIC | Age: 39
End: 2024-12-02
Payer: COMMERCIAL

## 2024-12-02 NOTE — TELEPHONE ENCOUNTER
Post OP phone call visit:    Type of surgery: Laparoscopic cholecystectomy with davinci robot.   How are you feeling? A lot better now.   Are you having any pain or Nausea? No pain. No nausea.   Do you have a normal appetite? Yes.   Are you passing gas or having any BM? Having BM's.   How is your activity? Okay.   Any drainage or fever? No redness. No drainage. No fever.

## 2024-12-02 NOTE — ANESTHESIA POSTPROCEDURE EVALUATION
"Patient: London Calabrese    Procedure Summary       Date: 11/29/24 Room / Location: [facility] OR  /  [facility] OR    Anesthesia Start: 1234 Anesthesia Stop: 1409    Procedure: CHOLECYSTECTOMY LAPAROSCOPIC WITH DAVINCI ROBOT (Abdomen) Diagnosis:     Surgeons: Evan Cosme MD Provider: Bony Anderson CRNA    Anesthesia Type: general ASA Status: 2 - Emergent            Anesthesia Type: general    Vitals  Vitals Value Taken Time   /73 11/29/24 1453   Temp 97.6 °F (36.4 °C) 11/29/24 1453   Pulse 87 11/29/24 1453   Resp 18 11/29/24 1453   SpO2 99 % 11/29/24 1453           Post Anesthesia Care and Evaluation    Patient location during evaluation: PACU  Patient participation: complete - patient participated  Level of consciousness: awake and awake and alert  Pain score: 0  Pain management: adequate    Airway patency: patent  Anesthetic complications: No anesthetic complications  PONV Status: none  Cardiovascular status: acceptable  Respiratory status: acceptable  Hydration status: acceptable    Comments: Patient discharged according to acceptable Alcides score per RN assessment. See nursing records for further information.     Blood pressure 110/62, pulse 68, temperature 97.6 °F (36.4 °C), temperature source Oral, resp. rate 16, height 175.3 cm (69\"), weight 77.1 kg (170 lb), SpO2 96%.      "

## 2024-12-06 LAB
CYTO UR: NORMAL
LAB AP CASE REPORT: NORMAL
Lab: NORMAL
PATH REPORT.FINAL DX SPEC: NORMAL
PATH REPORT.GROSS SPEC: NORMAL

## 2024-12-12 ENCOUNTER — OFFICE VISIT (OUTPATIENT)
Dept: SURGERY | Facility: CLINIC | Age: 39
End: 2024-12-12
Payer: COMMERCIAL

## 2024-12-12 VITALS
BODY MASS INDEX: 25.18 KG/M2 | HEART RATE: 87 BPM | DIASTOLIC BLOOD PRESSURE: 90 MMHG | OXYGEN SATURATION: 99 % | HEIGHT: 69 IN | WEIGHT: 170 LBS | SYSTOLIC BLOOD PRESSURE: 135 MMHG

## 2024-12-12 DIAGNOSIS — Z90.49 S/P LAPAROSCOPIC CHOLECYSTECTOMY: Primary | ICD-10-CM

## 2024-12-12 PROCEDURE — 99024 POSTOP FOLLOW-UP VISIT: CPT

## 2024-12-12 NOTE — PROGRESS NOTES
"Patient: London Calabrese    YOB: 1985    Date: 12/12/2024    Primary Care Provider: Provider, No Known    Vital Signs:   Vitals:    12/12/24 0942   BP: 135/90   Pulse: 87   SpO2: 99%   Weight: 77.1 kg (170 lb)   Height: 175.3 cm (69\")       Overall doing well s/p robotic assisted laparoscopic cholecystectomy by Dr. Cosme on 11/29/24. No fevers. Symptoms improved. Tolerating diet. Energy improving. Pain improving. Bowels moving ok. Sclera anicteric. Wounds healing well. No significant abdominal tenderness on exam. No evidence of jaundice or scleral icterus.     Results Review:   Pathology and operative findings reviewed with patient.    Tissue Pathology Exam (11/29/2024 12:56)   Gallbladder, cholecystectomy:  - Acute cholecystitis superimposed on chronic cholecystitis.  - Cholelithiasis.  - Cholesterolosis.  - No histologic evidence of malignancy.    Assessment / Plan:    Diagnoses and all orders for this visit:    1. S/P laparoscopic cholecystectomy (Primary)        London Calabrese is 2 weeks s/p laparoscopic cholecystectomy. He is overall doing well. At this time, he is able to return to normal activity as tolerated. He voiced understanding of this. He will call for an appointment if he has any new problems or concerns. He is agreeable to the plan.     Follow up:     Return if symptoms worsen or fail to improve.        Electronically signed by Susan Garcia PA-C  12/12/24  10:23 CST      "

## (undated) DEVICE — DEV SUT GRSPR CLOSUR 15CM 14G

## (undated) DEVICE — ADHS SKIN PREMIERPRO EXOFIN TOPICAL HI/VISC .5ML

## (undated) DEVICE — APPL CHLORAPREP HI/LITE 26ML ORNG

## (undated) DEVICE — ARM DRAPE

## (undated) DEVICE — SEAL

## (undated) DEVICE — SUT MNCRYL 4/0 PS2 27IN UD MCP426H

## (undated) DEVICE — SUCTION IRRIGATOR: Brand: ENDOWRIST

## (undated) DEVICE — KT CLN CLEANOR SCPE

## (undated) DEVICE — TISSUE RETRIEVAL SYSTEM: Brand: INZII RETRIEVAL SYSTEM

## (undated) DEVICE — BLADELESS OBTURATOR: Brand: WECK VISTA

## (undated) DEVICE — PATIENT RETURN ELECTRODE, SINGLE-USE, CONTACT QUALITY MONITORING, ADULT, WITH 9FT CORD, FOR PATIENTS WEIGING OVER 33LBS. (15KG): Brand: MEGADYNE

## (undated) DEVICE — SYR LL TP 10ML STRL

## (undated) DEVICE — NDL HYPO PRECISIONGLIDE REG 21G 1 1/2

## (undated) DEVICE — DAVINCI: Brand: MEDLINE INDUSTRIES, INC.

## (undated) DEVICE — ST TBG PNEUMOCLEAR EVAC SMOKE HIFLO